# Patient Record
Sex: MALE | Race: WHITE | Employment: FULL TIME | URBAN - METROPOLITAN AREA
[De-identification: names, ages, dates, MRNs, and addresses within clinical notes are randomized per-mention and may not be internally consistent; named-entity substitution may affect disease eponyms.]

---

## 2018-02-12 ENCOUNTER — TELEPHONE (OUTPATIENT)
Dept: ENDOCRINOLOGY | Age: 43
End: 2018-02-12

## 2018-02-12 DIAGNOSIS — E11.65 TYPE 2 DIABETES MELLITUS WITH HYPERGLYCEMIA, UNSPECIFIED LONG TERM INSULIN USE STATUS: Primary | ICD-10-CM

## 2018-02-19 ENCOUNTER — OFFICE VISIT (OUTPATIENT)
Dept: ENDOCRINOLOGY | Age: 43
End: 2018-02-19

## 2018-02-19 VITALS
SYSTOLIC BLOOD PRESSURE: 132 MMHG | TEMPERATURE: 97.7 F | OXYGEN SATURATION: 96 % | WEIGHT: 315 LBS | BODY MASS INDEX: 46.65 KG/M2 | RESPIRATION RATE: 16 BRPM | DIASTOLIC BLOOD PRESSURE: 64 MMHG | HEIGHT: 69 IN | HEART RATE: 87 BPM

## 2018-02-19 DIAGNOSIS — E66.01 OBESITY, MORBID (HCC): ICD-10-CM

## 2018-02-19 DIAGNOSIS — Z79.4 TYPE 2 DIABETES MELLITUS WITH HYPERGLYCEMIA, WITH LONG-TERM CURRENT USE OF INSULIN (HCC): ICD-10-CM

## 2018-02-19 DIAGNOSIS — E11.65 TYPE 2 DIABETES MELLITUS WITH HYPERGLYCEMIA, WITH LONG-TERM CURRENT USE OF INSULIN (HCC): ICD-10-CM

## 2018-02-19 DIAGNOSIS — Z99.89 OSA ON CPAP: ICD-10-CM

## 2018-02-19 DIAGNOSIS — E29.1 HYPOGONADISM MALE: ICD-10-CM

## 2018-02-19 DIAGNOSIS — D35.2 PITUITARY MACROADENOMA (HCC): ICD-10-CM

## 2018-02-19 DIAGNOSIS — E78.2 MIXED HYPERLIPIDEMIA: ICD-10-CM

## 2018-02-19 DIAGNOSIS — G47.33 OSA ON CPAP: ICD-10-CM

## 2018-02-19 DIAGNOSIS — E22.1 HYPERPROLACTINEMIA (HCC): ICD-10-CM

## 2018-02-19 DIAGNOSIS — N52.9 ED (ERECTILE DYSFUNCTION): ICD-10-CM

## 2018-02-19 DIAGNOSIS — Z79.4 TYPE 2 DIABETES MELLITUS WITH HYPERGLYCEMIA, WITH LONG-TERM CURRENT USE OF INSULIN (HCC): Primary | ICD-10-CM

## 2018-02-19 DIAGNOSIS — E78.5 HYPERLIPIDEMIA: ICD-10-CM

## 2018-02-19 DIAGNOSIS — E11.65 TYPE 2 DIABETES MELLITUS WITH HYPERGLYCEMIA, WITH LONG-TERM CURRENT USE OF INSULIN (HCC): Primary | ICD-10-CM

## 2018-02-19 DIAGNOSIS — I10 HTN (HYPERTENSION): ICD-10-CM

## 2018-02-19 RX ORDER — SYRINGE, DISPOSABLE, 3 ML
SYRINGE, EMPTY DISPOSABLE MISCELLANEOUS
Qty: 10 SYRINGE | Refills: 3 | Status: SHIPPED | OUTPATIENT
Start: 2018-02-19 | End: 2020-04-21 | Stop reason: SDUPTHER

## 2018-02-19 RX ORDER — BLOOD-GLUCOSE METER
EACH MISCELLANEOUS
Qty: 1 EACH | Refills: 0 | Status: SHIPPED | OUTPATIENT
Start: 2018-02-19 | End: 2020-04-21 | Stop reason: SDUPTHER

## 2018-02-19 RX ORDER — LANCETS 33 GAUGE
EACH MISCELLANEOUS
Qty: 100 LANCET | Refills: 11 | Status: SHIPPED | OUTPATIENT
Start: 2018-02-19 | End: 2021-06-19

## 2018-02-19 RX ORDER — PEN NEEDLE, DIABETIC 30 GX3/16"
NEEDLE, DISPOSABLE MISCELLANEOUS
Qty: 200 PEN NEEDLE | Refills: 11 | Status: SHIPPED | OUTPATIENT
Start: 2018-02-19 | End: 2021-03-08 | Stop reason: SDUPTHER

## 2018-02-19 RX ORDER — TESTOSTERONE CYPIONATE 200 MG/ML
INJECTION INTRAMUSCULAR
Qty: 6 ML | Refills: 3 | Status: SHIPPED | OUTPATIENT
Start: 2018-02-19 | End: 2018-09-20 | Stop reason: SDUPTHER

## 2018-02-19 RX ORDER — INSULIN GLARGINE 100 [IU]/ML
INJECTION, SOLUTION SUBCUTANEOUS
Qty: 45 ML | Refills: 5 | Status: SHIPPED | OUTPATIENT
Start: 2018-02-19 | End: 2019-07-23 | Stop reason: SDUPTHER

## 2018-02-19 RX ORDER — PRAVASTATIN SODIUM 20 MG/1
20 TABLET ORAL
Qty: 30 TAB | Refills: 5 | Status: SHIPPED | OUTPATIENT
Start: 2018-02-19 | End: 2019-07-23 | Stop reason: SDUPTHER

## 2018-02-19 RX ORDER — METFORMIN HYDROCHLORIDE 500 MG/1
1000 TABLET, EXTENDED RELEASE ORAL 2 TIMES DAILY
Qty: 360 TAB | Refills: 5 | Status: SHIPPED | OUTPATIENT
Start: 2018-02-19 | End: 2019-07-24 | Stop reason: SDUPTHER

## 2018-02-19 NOTE — PROGRESS NOTES
[facility]               Zuleyka Orr MD        [place] Sox  1975          History of Present Illness: Rebeca Reis is a 44 y.o. male here for reestablishment of care. He had lost insurance, managed at [facility] and osteoporosis recently. He was out of insulin and did not take it for  few months, his A1c is very high  He also could not get testosterone as he was taking for hypogonadism  He had MRI of the pituitary in 2016 and was reported as \"small microadenoma\"  No headaches, peripheral vision loss. No hypoglycemia  Has polyuria, polydipsia  No chest pain or shortness of breath  Complains of tingling in the feet    TROY - on Bipap    Type 2 Diabetes was diagnosed in 6yrs ago .   extended-release, insurance did not cover Januvia  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 prolactin    MRI 2016 Sep        Wt Readings from Last 3 Encounters:   02/19/18 320 lb 9.6 oz (145.4 kg)   11/25/14 324 lb 3.2 oz (147.1 kg)   07/22/14 318 lb (144.2 kg)       BP Readings from Last 3 Encounters:   02/19/18 132/64   11/25/14 137/78   07/22/14 134/84           Past Medical History:   Diagnosis Date    Anxiety     Arthritis     DM (diabetes mellitus) ([address]     GERD (gastroesophageal reflux disease)     HTN (hypertension)     Hyperprolactinemia ([address]     Hypogonadotropic hypogonadism ([address]     TROY on CPAP     Pituitary macroadenoma ([facility] December 2014     Current Outpatient Prescriptions   Medication Sig    insulin detemir (LEVEMIR) 100 unit/mL (3 mL) pen Inject 60 twice a day (Patient taking differently: Inject 75 twice a day)    insulin lispro (HUMALOG) 100 unit/mL kwikpen Inject 35 units before breakfast, 35 units before lunch and 35 units before dinner w/ SSI Max units: 165    lisinopril-hydrochlorothiazide (PRINZIDE) 20-12.5 mg per tablet 1 tab in AM    glucose blood VI test strips (CONTOUR NEXT STRIPS) strip Test 3 times daily    Lancets (MICROLET LANCET) misc Test 3 times daily    glucose blood VI test strips (ASCENSIA CONTOUR) strip Test three times daily    Blood-Glucose Meter (CONTOUR NEXT METER) Misc Use as directed    Insulin Needles, Disposable, 31 X 5/16 \" Ndle Dispense 100 each, use once a day with lantus    ALPRAZolam (XANAX) 0.25 mg tablet Take 1 tablet by mouth two (2) times daily as needed for Anxiety.  metFORMIN ER (GLUCOPHAGE XR) 500 mg tablet Take 2 tablets by mouth two (2) times a day.  albiglutide (TANZEUM) 30 mg/0.5 mL pnij 30 mg by SubCUTAneous route every seven (7) days.  cabergoline (DOSTINEX) 0.5 mg tablet 1/2 pill at bedtime Monday and Thursday    dapagliflozin (FARXIGA) 10 mg tab Take 10 mg by mouth daily.  testosterone cypionate (DEPOTESTOTERONE CYPIONATE) 200 mg/mL injection 1.25 ml ( 250 mg ) IM every 2 weeks    Needle, Disp, 22 G 22 x 1 1/2 \" ndle Q 2 weeks    Syringe, Disposable, 3 mL syrg Once Q 2 weeks    tadalafil (CIALIS) 20 mg tablet Take 1 Tab by mouth as needed. Every 5 days     No current facility-administered medications for this visit. Allergies   Allergen Reactions    Amoxicillin Hives    Erythromycin Other (comments)     Stomach cramps    Penicillins Hives    Sulfa (Sulfonamide Antibiotics) Hives         Review of Systems:  - Constitutional Symptoms: no fevers, no chills  - Eyes: no blurry vision no double vision  - Cardiovascular: no chest pain ,no palpitations  - Respiratory: no cough no shortness of breath  - Gastrointestinal: no dysphagia no  abdominal pain  - Musculoskeletal: no joint pains ,  weakness  - Integumentary: no rashes  - Neurological: no numbness, tingling, no  headaches  -     Physical Examination:   Blood pressure 132/64, pulse 87, temperature 97.7 °F (36.5 °C), temperature source Oral, resp. rate 16, height 5' 9\" (1.753 m), weight 320 lb 9.6 oz (145.4 kg), SpO2 96 %.  Estimated body mass index is 47.34 kg/(m^2) as calculated from the following:    Height as of this encounter: 5' 9\" (1.753 m). -   Weight as of this encounter: 320 lb 9.6 oz (145.4 kg). - General: pleasant, no distress, good eye contact  - HEENT: no pallor, no periorbital edema, EOMI  - Neck: supple, no thyromegaly, thick neck  - Cardiovascular: regular, normal rate, normal S1 and S2  - Respiratory: clear to auscultation bilaterally  - Gastrointestinal: soft, nontender, nondistended,  BS +  - Musculoskeletal:,alert and oriented  - Psychiatric: normal mood and affect  - Skin: color, texture, turgor normal.     Diabetic foot exam: February 2018    Left:     Vibratory sensation decreased   Filament test normal sensation with micro filament   Pulse DP: 1+    Deformities: None  Right:    Vibratory sensation decreased   Filament test normal sensation with micro filament   Pulse DP: 1+   Deformities: None      Data Reviewed:     [] Glucose records reviewed. [] See glucose records for details (to be scanned). [x] A1C 12  [x] Reviewed labs    Lab Results   Component Value Date/Time    WBC 9.8 07/10/2014 03:42 PM    HGB 13.7 07/10/2014 03:42 PM    HCT 40.8 07/10/2014 03:42 PM    PLATELET 771 69/63/4195 03:42 PM    MCV 81 07/10/2014 03:42 PM     Lab Results   Component Value Date/Time    Hemoglobin A1c 11.2 (H) 02/13/2018 02:00 PM    Hemoglobin A1c 10.7 (H) 11/18/2014 09:57 AM    Hemoglobin A1c 9.4 (H) 07/10/2014 03:42 PM    Microalb/Creat ratio (ug/mg creat.) <4.4 02/13/2018 02:00 PM    LDL, calculated 110 (H) 02/13/2018 02:00 PM    Creatinine 0.72 (L) 02/13/2018 02:00 PM      Lab Results   Component Value Date/Time    Cholesterol, total 169 02/13/2018 02:00 PM    HDL Cholesterol 29 (L) 02/13/2018 02:00 PM    LDL, calculated 110 (H) 02/13/2018 02:00 PM    Triglyceride 149 02/13/2018 02:00 PM     Lab Results   Component Value Date/Time    AST (SGOT) 15 02/13/2018 02:00 PM    Alk.  phosphatase 62 02/13/2018 02:00 PM     Lab Results   Component Value Date/Time    GFR est  02/13/2018 02:00 PM    GFR est non- 02/13/2018 02:00 PM    Creatinine 0.72 (L) 02/13/2018 02:00 PM    BUN 11 02/13/2018 02:00 PM    Sodium 137 02/13/2018 02:00 PM    Potassium 4.6 02/13/2018 02:00 PM    Chloride 96 02/13/2018 02:00 PM    CO2 26 02/13/2018 02:00 PM      Lab Results   Component Value Date/Time    Prostate Specific Ag 0.9 11/18/2014 09:57 AM    Prostate Specific Ag 0.2 08/08/2013 11:35 AM     Lab Results   Component Value Date/Time    TSH 1.800 08/08/2013 11:35 AM    T4, Free 1.38 01/02/2014 08:42 AM      Component      Latest Ref Rng 8/8/2013   TESTOSTERONE, TOTAL, LC/MS      348.0 - 1197.0 ng/dL 15.4 (L)   TESTOSTERONE, FREE      5.00 - 21.00 ng/dL 0.67 (L)   % Free testosterone      1.50 - 4.20 % 4.36 (H)   Luteinizing hormone      1.7 - 8.6 mIU/mL 0.5 (L)   FSH      1.5 - 12.4 mIU/mL 0.8 (L)   Prolactin      4.0 - 15.2 ng/mL 130.7 (H)      Lab Results   Component Value Date/Time    Testosterone 206 (L) 07/10/2014 03:42 PM     Lab Results   Component Value Date/Time    FSH 0.8 (L) 08/08/2013 11:35 AM    Luteinizing hormone 0.5 (L) 08/08/2013 11:35 AM    Prolactin 7.0 07/10/2014 03:42 PM           Lab Results   Component Value Date/Time    Hemoglobin A1c 11.2 (H) 02/13/2018 02:00 PM    Hemoglobin A1c 10.7 (H) 11/18/2014 09:57 AM    Hemoglobin A1c 9.4 (H) 07/10/2014 03:42 PM    Microalb/Creat ratio (ug/mg creat.) <4.4 02/13/2018 02:00 PM    LDL, calculated 110 (H) 02/13/2018 02:00 PM    Creatinine 0.72 (L) 02/13/2018 02:00 PM      MRI 2014: normal      Assessment/Plan:     1. Type 2 Diabetes Mellitus  Severe hyperglycemia due to missing insulin  Resume insulin  Metformin XR , resume  Insulin glargine insulin 75 units BID  Novolog/Humalog insulin 35 units before each meal  Correction 1 unit for every 8 mg  Has tried Farxiga 10 mg ,Tanzeum 30 mg   FLU annually ,Pneumovax ,aspirin daily,annual eye exam,microalbumin.   Needs monitoring of the blood glucose 4 times a day, not able to get this test strips from insurance. This puts him at a very high risk for hypoglycemia if he does not monitor. 2. Hyperlipidemia:  Low-fat, low-cholesterol diet. recent dietary indiscretion,agreed to change diet , recheck lipids and if no improvement start statins    3. HTN :prinzide    4. TROY- on CPAP    5. Hypogonadotropic hypogonadism: secondary to obesity, narcotics, TROY  Testosterone did not improve much with normalization of prolactin  Replacement testosterone  Cialis PRN     5. Macroadenoma 1.6 cm -prolactin secreting  MRI pituitary in August 2014 - no lesion  MRI 2016: Small microadenoma  He has taken cabergoline for 1-1/2 year, check prolactin and if normal will hold off. Monitor prolactin levels.         Corinne Gent, MD

## 2018-02-19 NOTE — PROGRESS NOTES
Dev Baxter is a 36 y.o. male here for   Chief Complaint   Patient presents with   [facility] Patient     DM, last seen 11/25/14       Functional glucose monitor and record keeping system? - yes  Eye exam within last year? - need referral  Foot exam within last year? - due today    1. Have you been to the ER, urgent care clinic since your last visit? Hospitalized since your last visit? -no    2. Have you seen or consulted any other health care providers outside of the [address] [place] [address] since your last visit? Include any pap smears or colon screening. -PCP      Lab Results   Component Value Date/Time    Hemoglobin A1c 11.2 (H) 02/13/2018 02:00 PM    Hemoglobin A1c (POC) 11.6 11/07/2013 10:20 AM       Wt Readings from Last 3 Encounters:   11/25/14 324 lb 3.2 oz (147.1 kg)   07/22/14 318 lb (144.2 kg)   07/17/14 318 lb (144.2 kg)     Temp Readings from Last 3 Encounters:   11/25/14 97.4 °F (36.3 °C)   01/09/14 98.5 °F (36.9 °C) (Oral)     BP Readings from Last 3 Encounters:   11/25/14 137/78   07/22/14 134/84   07/17/14 148/81     Pulse Readings from Last 3 Encounters:   11/25/14 78   07/22/14 76   07/17/14 71

## 2018-02-19 NOTE — PATIENT INSTRUCTIONS
Check blood sugars before meals/ and at bedtime. If the bedtime sugars are less than 100 ,eat a 15 gm snack. Weight and diet control. Start Metformin  mg 2 tabs twice a day     Basaglar insulin 75 twice a day     If morning glucose before breakfast is more than 140 , 2 days in a row , go up by 5 units  until your morning sugar is consistently less than 140. Humalog insulin 35 units before breakfast, 35 units before lunch and 35 units before dinner.      Humalog  with meals if blood sugars are[de-identified]   Less than 70 -            decrease by 5 units  150-200 mg   5 units   201-250 mg   10 units   251-300 mg   15 units   301-350 mg   18 units   351-400 mg   20  units

## 2018-02-19 NOTE — MR AVS SNAPSHOT
[address] [address] 
[phone] Patient: Gifty Payan MRN: [id number] :[id number] Visit Information Date & Time Provider Department Dept. Phone Encounter #  
 2018  2:00 PM Yokasta Garcia MD [facility] [phone] [id number] Follow-up Instructions Return in about 3 months (around 2018). Upcoming Health Maintenance Date Due  
 FOOT EXAM Q1 1984 EYE EXAM RETINAL OR DILATED Q1 1984 Pneumococcal 19-64 Medium Risk (1 of 1 - PPSV23) 1993 DTaP/Tdap/Td series (1 - Tdap) 1995 Influenza Age 5 to Adult 2017 HEMOGLOBIN A1C Q6M 2018 MICROALBUMIN Q1 2019 LIPID PANEL Q1 2019 Allergies as of 2018  Review Complete On: 2018 By: Yokasta Garcia MD  
  
 Severity Noted Reaction Type Reactions Amoxicillin  2018    Hives Erythromycin  2013    Other (comments) Stomach cramps Penicillins  2013    Hives Sulfa (Sulfonamide Antibiotics)  2013    Hives Current Immunizations  Never Reviewed No immunizations on file. Not reviewed this visit You Were Diagnosed With   
  
 Codes Comments Type 2 diabetes mellitus with hyperglycemia, with long-term current use of insulin (HCC)    -  Primary ICD-10-CM: E11.65, Z79.4 ICD-9-CM: 250.00, 790.29, V58.67 Hypogonadism male     ICD-10-CM: E29.1 ICD-9-CM: 257.2 Hyperprolactinemia ([facility]     ICD-10-CM: E22.1 ICD-9-CM: 253.1 Pituitary macroadenoma ([facility]     ICD-10-CM: D35.2 ICD-9-CM: 227.3 Vitals BP Pulse Temp Resp Height(growth percentile) Weight(growth percentile) 132/64 (BP 1 Location: Left arm, BP Patient Position: Sitting) 87 97.7 °F (36.5 °C) (Oral) 16 5' 9\" (1.753 m) 320 lb 9.6 oz (145.4 kg) SpO2 BMI Smoking Status 96% 47.34 kg/m2 Never Smoker Vitals History BMI and BSA Data Body Mass Index Body Surface Area  
 47.34 kg/m 2 2.66 m 2 Preferred Pharmacy Pharmacy Name Phone [facility], [address] [place] [phone] Your Updated Medication List  
  
   
This list is accurate as of: 2/19/18  2:54 PM.  Always use your most recent med list.  
  
  
  
  
 ALPRAZolam 0.25 mg tablet Commonly known as:  Flavia Alert Take 1 tablet by mouth two (2) times daily as needed for Anxiety. Blood-Glucose Meter Misc Commonly known as:  ONETOUCH VERIO FLEX Test TID Dx Code: E11.65  
  
 cabergoline 0.5 mg tablet Commonly known as:  DOSTINEX  
1/2 pill at bedtime Monday and Thursday  
  
 dapagliflozin 10 mg Tab tablet Commonly known as:  [employer] Take 10 mg by mouth daily. insulin detemir U-100 100 unit/mL (3 mL) Inpn Commonly known as:  Andi Radha Inject 60 twice a day  
  
 insulin lispro 100 unit/mL kwikpen Commonly known as:  HUMALOG Inject 35 units before breakfast, 35 units before lunch and 35 units before dinner w/ SSI Max units: 165 Insulin Needles (Disposable) 31 gauge x 5/16\" Ndle Dispense 100 each, use once a day with lantus  
  
 lisinopril-hydroCHLOROthiazide 20-12.5 mg per tablet Commonly known as:  PRINZIDE  
1 tab in AM  
  
 metFORMIN  mg tablet Commonly known as:  GLUCOPHAGE XR Take 2 tablets by mouth two (2) times a day. Needle (Disp) 22 G 22 gauge x 1 1/2\" Ndle Q 2 weeks Syringe (Disposable) 3 mL Syrg Once Q 2 weeks  
  
 tadalafil 20 mg tablet Commonly known as:  CIALIS Take 1 Tab by mouth as needed. Every 5 days  
  
 testosterone cypionate 200 mg/mL injection Commonly known as:  DEPOTESTOTERONE CYPIONATE  
1.25 ml ( 250 mg ) IM every 2 weeks Prescriptions Printed Refills Blood-Glucose Meter (ONETOUCH VERIO FLEX) misc 0 Sig: Test TID Dx Code: E11.65 Class: Print We Performed the Following CBC WITH AUTOMATED DIFF [58993 CPT(R)] PROLACTIN [18591 CPT(R)] PSA, DIAGNOSTIC (PROSTATE SPECIFIC AG) [id number] CPT(R)] Follow-up Instructions Return in about 3 months (around 5/19/2018). Patient Instructions Check blood sugars before meals/ and at bedtime. If the bedtime sugars are less than 100 ,eat a 15 gm snack. Weight and diet control. Start Metformin  mg 2 tabs twice a day Basaglar insulin 75 twice a day If morning glucose before breakfast is more than 140 , 2 days in a row , go up by 5 units  until your morning sugar is consistently less than 140. Humalog insulin 35 units before breakfast, 35 units before lunch and 35 units before dinner. Humalog  with meals if blood sugars are[de-identified]  
Less than 70 -            decrease by 5 units 150-200 mg   5 units 201-250 mg   10 units 251-300 mg   15 units 301-350 mg   18 units 351-400 mg   20  units Introducing [facility] SERVICES! Dear Te: Thank you for requesting a [employer] account. Our records indicate that you already have an active [employer] account. You can access your account anytime at [email or web address] Did you know that you can access your hospital and ER discharge instructions at any time in [employer]? You can also review all of your test results from your hospital stay or ER visit. Additional Information If you have questions, please visit the Frequently Asked Questions section of the [employer] website at [email or web address] Remember, [employer] is NOT to be used for urgent needs. For medical emergencies, dial 911. Now available from your iPhone and Android! Please provide this summary of care documentation to your next provider. Your primary care clinician is listed as Daryl Polk. If you have any questions after today's visit, please call [phone].

## 2018-02-20 LAB
BASOPHILS # BLD AUTO: 0 X10E3/UL (ref 0–0.2)
BASOPHILS NFR BLD AUTO: 0 %
EOSINOPHIL # BLD AUTO: 0.3 X10E3/UL (ref 0–0.4)
EOSINOPHIL NFR BLD AUTO: 3 %
ERYTHROCYTE [DISTWIDTH] IN BLOOD BY AUTOMATED COUNT: 14.2 % (ref 12.3–15.4)
HCT VFR BLD AUTO: 40.4 % (ref 37.5–51)
HGB BLD-MCNC: 13.2 G/DL (ref 13–17.7)
IMM GRANULOCYTES # BLD: 0.1 X10E3/UL (ref 0–0.1)
IMM GRANULOCYTES NFR BLD: 1 %
LYMPHOCYTES # BLD AUTO: 3.7 X10E3/UL (ref 0.7–3.1)
LYMPHOCYTES NFR BLD AUTO: 35 %
MCH RBC QN AUTO: 27 PG (ref 26.6–33)
MCHC RBC AUTO-ENTMCNC: 32.7 G/DL (ref 31.5–35.7)
MCV RBC AUTO: 83 FL (ref 79–97)
MONOCYTES # BLD AUTO: 0.6 X10E3/UL (ref 0.1–0.9)
MONOCYTES NFR BLD AUTO: 6 %
NEUTROPHILS # BLD AUTO: 5.9 X10E3/UL (ref 1.4–7)
NEUTROPHILS NFR BLD AUTO: 55 %
PLATELET # BLD AUTO: 399 X10E3/UL (ref 150–379)
PROLACTIN SERPL-MCNC: 55.3 NG/ML (ref 4–15.2)
PSA SERPL-MCNC: 0.2 NG/ML (ref 0–4)
RBC # BLD AUTO: 4.89 X10E6/UL (ref 4.14–5.8)
WBC # BLD AUTO: 10.6 X10E3/UL (ref 3.4–10.8)

## 2018-02-23 ENCOUNTER — TELEPHONE (OUTPATIENT)
Dept: ENDOCRINOLOGY | Age: 43
End: 2018-02-23

## 2018-02-23 DIAGNOSIS — E22.1 HYPERPROLACTINEMIA (HCC): ICD-10-CM

## 2018-02-23 RX ORDER — CABERGOLINE 0.5 MG/1
TABLET ORAL
Qty: 12 TAB | Refills: 3 | Status: SHIPPED | OUTPATIENT
Start: 2018-02-23 | End: 2019-10-31

## 2018-02-23 NOTE — TELEPHONE ENCOUNTER
----- Message from Tisha Hussein MD sent at 2/22/2018  9:17 PM EST -----  Need cabergoline as prolactin is elevated

## 2018-02-23 NOTE — TELEPHONE ENCOUNTER
Per Dr. Tina Tapia, informed pt of result note, as noted above. Pt verbalized understanding with no further questions or concerns at this time.

## 2018-09-20 DIAGNOSIS — G47.33 OSA ON CPAP: ICD-10-CM

## 2018-09-20 DIAGNOSIS — E29.1 HYPOGONADISM MALE: ICD-10-CM

## 2018-09-20 DIAGNOSIS — E11.65 TYPE 2 DIABETES MELLITUS WITH HYPERGLYCEMIA, WITH LONG-TERM CURRENT USE OF INSULIN (HCC): ICD-10-CM

## 2018-09-20 DIAGNOSIS — E22.1 HYPERPROLACTINEMIA (HCC): ICD-10-CM

## 2018-09-20 DIAGNOSIS — Z79.4 TYPE 2 DIABETES MELLITUS WITH HYPERGLYCEMIA, WITH LONG-TERM CURRENT USE OF INSULIN (HCC): ICD-10-CM

## 2018-09-20 DIAGNOSIS — Z99.89 OSA ON CPAP: ICD-10-CM

## 2018-09-20 RX ORDER — TESTOSTERONE CYPIONATE 200 MG/ML
INJECTION INTRAMUSCULAR
Qty: 6 ML | Refills: 0 | Status: SHIPPED | OUTPATIENT
Start: 2018-09-20 | End: 2019-07-23 | Stop reason: SDUPTHER

## 2019-01-21 ENCOUNTER — OP HISTORICAL/CONVERTED ENCOUNTER (OUTPATIENT)
Dept: OTHER | Age: 44
End: 2019-01-21

## 2019-02-26 ENCOUNTER — ED HISTORICAL/CONVERTED ENCOUNTER (OUTPATIENT)
Dept: OTHER | Age: 44
End: 2019-02-26

## 2019-07-23 ENCOUNTER — OFFICE VISIT (OUTPATIENT)
Dept: ENDOCRINOLOGY | Age: 44
End: 2019-07-23

## 2019-07-23 VITALS
RESPIRATION RATE: 16 BRPM | OXYGEN SATURATION: 98 % | HEART RATE: 77 BPM | TEMPERATURE: 97.4 F | WEIGHT: 315 LBS | HEIGHT: 69 IN | DIASTOLIC BLOOD PRESSURE: 87 MMHG | BODY MASS INDEX: 46.65 KG/M2 | SYSTOLIC BLOOD PRESSURE: 165 MMHG

## 2019-07-23 DIAGNOSIS — R35.1 NOCTURIA: ICD-10-CM

## 2019-07-23 DIAGNOSIS — Z99.89 OSA ON CPAP: ICD-10-CM

## 2019-07-23 DIAGNOSIS — E11.65 TYPE 2 DIABETES MELLITUS WITH HYPERGLYCEMIA, WITH LONG-TERM CURRENT USE OF INSULIN (HCC): ICD-10-CM

## 2019-07-23 DIAGNOSIS — D35.2 PITUITARY MACROADENOMA (HCC): ICD-10-CM

## 2019-07-23 DIAGNOSIS — E11.9 DM (DIABETES MELLITUS) (HCC): ICD-10-CM

## 2019-07-23 DIAGNOSIS — G47.33 OSA ON CPAP: ICD-10-CM

## 2019-07-23 DIAGNOSIS — E66.01 OBESITY, MORBID (HCC): Primary | ICD-10-CM

## 2019-07-23 DIAGNOSIS — E66.01 OBESITY, MORBID (HCC): ICD-10-CM

## 2019-07-23 DIAGNOSIS — E29.1 HYPOGONADISM MALE: ICD-10-CM

## 2019-07-23 DIAGNOSIS — I10 HTN (HYPERTENSION): ICD-10-CM

## 2019-07-23 DIAGNOSIS — E22.1 HYPERPROLACTINEMIA (HCC): ICD-10-CM

## 2019-07-23 DIAGNOSIS — Z79.4 TYPE 2 DIABETES MELLITUS WITH HYPERGLYCEMIA, WITH LONG-TERM CURRENT USE OF INSULIN (HCC): ICD-10-CM

## 2019-07-23 LAB — HBA1C MFR BLD HPLC: 7.6 %

## 2019-07-23 RX ORDER — EMTRICITABINE AND TENOFOVIR DISOPROXIL FUMARATE 200; 300 MG/1; MG/1
TABLET, FILM COATED ORAL DAILY
COMMUNITY
End: 2020-02-05 | Stop reason: SDUPTHER

## 2019-07-23 RX ORDER — PRAVASTATIN SODIUM 20 MG/1
20 TABLET ORAL
Qty: 30 TAB | Refills: 5 | Status: SHIPPED | OUTPATIENT
Start: 2019-07-23 | End: 2021-06-19

## 2019-07-23 RX ORDER — TESTOSTERONE CYPIONATE 200 MG/ML
INJECTION INTRAMUSCULAR
Qty: 6 ML | Refills: 0 | Status: SHIPPED | OUTPATIENT
Start: 2019-07-23 | End: 2020-04-21 | Stop reason: SDUPTHER

## 2019-07-23 RX ORDER — INSULIN GLARGINE 100 [IU]/ML
INJECTION, SOLUTION SUBCUTANEOUS
Qty: 45 ML | Refills: 5 | Status: SHIPPED | OUTPATIENT
Start: 2019-07-23 | End: 2020-04-21 | Stop reason: SDUPTHER

## 2019-07-23 RX ORDER — PEN NEEDLE, DIABETIC 30 GX3/16"
NEEDLE, DISPOSABLE MISCELLANEOUS
Qty: 100 PEN NEEDLE | Refills: 11 | Status: SHIPPED | OUTPATIENT
Start: 2019-07-23 | End: 2021-03-16 | Stop reason: SDUPTHER

## 2019-07-23 NOTE — PROGRESS NOTES
1. Have you been to the ER, urgent care clinic since your last visit? Hospitalized since your last visit? NO  2. Have you seen or consulted any other health care providers outside of the [address] [place] [address] since your last visit? Include any pap smears or colon screening. NO    Chief Complaint   Patient presents with    Diabetes     follow up         Learning assessment complete  Abuse Screening Questionnaire 7/23/2019   Do you ever feel afraid of your partner? N   Are you in a relationship with someone who physically or mentally threatens you? N   Is it safe for you to go home? Y     Wt Readings from Last 3 Encounters:   07/23/19 333 lb 6.4 oz (151.2 kg)   02/19/18 320 lb 9.6 oz (145.4 kg)   11/25/14 324 lb 3.2 oz (147.1 kg)     Temp Readings from Last 3 Encounters:   07/23/19 97.4 °F (36.3 °C) (Oral)   02/19/18 97.7 °F (36.5 °C) (Oral)   11/25/14 97.4 °F (36.3 °C)     BP Readings from Last 3 Encounters:   07/23/19 165/87   02/19/18 132/64   11/25/14 137/78     Pulse Readings from Last 3 Encounters:   07/23/19 77   02/19/18 87   11/25/14 78     Medication reviewed with patient to the best of his ability and nurse document \"not taking\" and/or \"taking\" based on patients response.   Lab Results   Component Value Date/Time    Hemoglobin A1c 11.2 (H) 02/13/2018 02:00 PM    Hemoglobin A1c (POC) 7.6 07/23/2019 01:16 PM

## 2019-07-23 NOTE — PROGRESS NOTES
[facility] DIABETES AND ENDOCRINOLOGY               MD Dominique Pineda  1975          History of Present Illness: Darling Polk is a 44 y.o. male here for follow-up  He was seen 1 and 1/2-year ago  Run out of prescriptions, he did not take blood pressure medication, statin, cabergoline, testosterone  [facility]  Recently has changed the diet, and is able to control the blood glucose with just basal insulin    No headaches, peripheral vision loss. No hypoglycemia  Has polyuria, polydipsia  No chest pain or shortness of breath  Complains of tingling in the feet    TROY - on Bipap    Type 2 Diabetes was diagnosed in 6yrs ago .   extended-release, insurance did not cover Januvia  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 prolactin    MRI 2016 Sep        Wt Readings from Last 3 Encounters:   07/23/19 333 lb 6.4 oz (151.2 kg)   02/19/18 320 lb 9.6 oz (145.4 kg)   11/25/14 324 lb 3.2 oz (147.1 kg)       BP Readings from Last 3 Encounters:   07/23/19 165/87   02/19/18 132/64   11/25/14 137/78           Past Medical History:   Diagnosis Date    Anxiety     Arthritis     DM [facility]     GERD (gastroesophageal reflux disease)     HTN (hypertension)     Hyperprolactinemia (HCC)     Hypogonadotropic hypogonadism ([facility]     TROY on CPAP     Pituitary macroadenoma ([facility] [facility] December 2014     Current Outpatient Medications   Medication Sig    Blood-Glucose Meter (ONETOUCH VERIO FLEX) misc Test TID Dx Code: E11.65    insulin glargine (BASAGLAR KWIKPEN U-100 INSULIN) 100 unit/mL (3 mL) inpn 75 units twice a day    Syringe, Disposable, 3 mL syrg Once Q 2 weeks    Needle, Disp, 22 G 22 gauge x 1 1/2\" ndle Q 2 weeks    lancets (ONE TOUCH DELICA) 33 gauge misc Test TID Dx Code: E11.65    glucose blood VI test strips (ONETOUCH VERIO) strip Test TID Dx Code: E11.65    Insulin Needles, Disposable, 31 gauge x 5/16\" ndle Use to inject insulin 5x daily Dx Code: E11.65    Insulin Needles, Disposable, 31 X 5/16 \" Ndle Dispense 100 each, use once a day with lantus    testosterone cypionate (DEPOTESTOTERONE CYPIONATE) 200 mg/mL injection INJECT 1.25 ML INTO THE MUSCLE EVERY 2 WEEKS    cabergoline (DOSTINEX) 0.5 mg tablet 1/2 pill at bedtime Monday and Thursday    insulin lispro (HUMALOG KWIKPEN INSULIN) 200 unit/mL (3 mL) inpn 35 units before breakfast, 35 units before lunch and 35 units before dinner w/ SSI Max units daily: 165    metFORMIN ER (GLUCOPHAGE XR) 500 mg tablet Take 2 Tabs by mouth two (2) times a day.  pravastatin (PRAVACHOL) 20 mg tablet Take 1 Tab by mouth nightly.  ALPRAZolam (XANAX) 0.25 mg tablet Take 1 tablet by mouth two (2) times daily as needed for Anxiety.  lisinopril-hydrochlorothiazide (PRINZIDE) 20-12.5 mg per tablet 1 tab in AM    dapagliflozin (FARXIGA) 10 mg tab Take 10 mg by mouth daily.  tadalafil (CIALIS) 20 mg tablet Take 1 Tab by mouth as needed. Every 5 days     No current facility-administered medications for this visit. Allergies   Allergen Reactions    Amoxicillin Hives    Erythromycin Other (comments)     Stomach cramps    Penicillins Hives    Sulfa (Sulfonamide Antibiotics) Hives         Review of Systems:  - Constitutional Symptoms: no fevers, no chills  - Eyes: no blurry vision no double vision  - Cardiovascular: no chest pain ,no palpitations  - Respiratory: no cough no shortness of breath  - Gastrointestinal: no dysphagia no  abdominal pain  - Musculoskeletal: no joint pains ,  weakness  - Integumentary: no rashes  - Neurological: no numbness, tingling, no  headaches  -     Physical Examination:   Blood pressure 165/87, pulse 77, temperature 97.4 °F (36.3 °C), temperature source Oral, resp. rate 16, height 5' 9\" (1.753 m), weight 333 lb 6.4 oz (151.2 kg), SpO2 98 %.  Estimated body mass index is 49.23 kg/m² as calculated from the following: Height as of this encounter: 5' 9\" (1.753 m). -   Weight as of this encounter: 333 lb 6.4 oz (151.2 kg). - General: pleasant, no distress, good eye contact  - HEENT: no pallor, no periorbital edema, EOMI  - Neck: supple, no thyromegaly, thick neck  - Cardiovascular: regular, normal rate, normal S1 and S2  - Respiratory: clear to auscultation bilaterally  - Gastrointestinal: soft, nontender, nondistended,  BS +  - Musculoskeletal:,alert and oriented  - Psychiatric: normal mood and affect  - Skin: color, texture, turgor normal.     Diabetic foot exam: July 2019    Left:     Vibratory sensation decreased   Filament test normal sensation with micro filament   Pulse DP: 1+    Deformities: None  Right:    Vibratory sensation decreased   Filament test normal sensation with micro filament   Pulse DP: 1+   Deformities: None      Data Reviewed:       Lab Results   Component Value Date/Time    WBC 10.6 02/19/2018 03:02 PM    HGB 13.2 02/19/2018 03:02 PM    HCT 40.4 02/19/2018 03:02 PM    PLATELET 675 (H) 52/10/8492 03:02 PM    MCV 83 02/19/2018 03:02 PM     Lab Results   Component Value Date/Time    Hemoglobin A1c 11.2 (H) 02/13/2018 02:00 PM    Hemoglobin A1c 10.7 (H) 11/18/2014 09:57 AM    Hemoglobin A1c 9.4 (H) 07/10/2014 03:42 PM    Microalb/Creat ratio (ug/mg creat.) <4.4 02/13/2018 02:00 PM    LDL, calculated 110 (H) 02/13/2018 02:00 PM    Creatinine 0.72 (L) 02/13/2018 02:00 PM      Lab Results   Component Value Date/Time    Cholesterol, total 169 02/13/2018 02:00 PM    HDL Cholesterol 29 (L) 02/13/2018 02:00 PM    LDL, calculated 110 (H) 02/13/2018 02:00 PM    Triglyceride 149 02/13/2018 02:00 PM     Lab Results   Component Value Date/Time    AST (SGOT) 15 02/13/2018 02:00 PM    Alk.  phosphatase 62 02/13/2018 02:00 PM     Lab Results   Component Value Date/Time    GFR est  02/13/2018 02:00 PM    GFR est non- 02/13/2018 02:00 PM    Creatinine 0.72 (L) 02/13/2018 02:00 PM    BUN 11 02/13/2018 02:00 PM Sodium 137 02/13/2018 02:00 PM    Potassium 4.6 02/13/2018 02:00 PM    Chloride 96 02/13/2018 02:00 PM    CO2 26 02/13/2018 02:00 PM      Lab Results   Component Value Date/Time    Prostate Specific Ag 0.2 02/19/2018 03:02 PM    Prostate Specific Ag 0.9 11/18/2014 09:57 AM    Prostate Specific Ag 0.2 08/08/2013 11:35 AM     Lab Results   Component Value Date/Time    TSH 1.800 08/08/2013 11:35 AM    T4, Free 1.38 01/02/2014 08:42 AM      Component      Latest Ref Rng 8/8/2013   TESTOSTERONE, TOTAL, LC/MS      348.0 - 1197.0 ng/dL 15.4 (L)   TESTOSTERONE, FREE      5.00 - 21.00 ng/dL 0.67 (L)   % Free testosterone      1.50 - 4.20 % 4.36 (H)   Luteinizing hormone      1.7 - 8.6 mIU/mL 0.5 (L)   FSH      1.5 - 12.4 mIU/mL 0.8 (L)   Prolactin      4.0 - 15.2 ng/mL 130.7 (H)      Lab Results   Component Value Date/Time    Testosterone 206 (L) 07/10/2014 03:42 PM     Lab Results   Component Value Date/Time    FSH 0.8 (L) 08/08/2013 11:35 AM    Luteinizing hormone 0.5 (L) 08/08/2013 11:35 AM    Prolactin 55.3 (H) 02/19/2018 03:02 PM           Lab Results   Component Value Date/Time    Hemoglobin A1c 11.2 (H) 02/13/2018 02:00 PM    Hemoglobin A1c 10.7 (H) 11/18/2014 09:57 AM    Hemoglobin A1c 9.4 (H) 07/10/2014 03:42 PM    Microalb/Creat ratio (ug/mg creat.) <4.4 02/13/2018 02:00 PM    LDL, calculated 110 (H) 02/13/2018 02:00 PM    Creatinine 0.72 (L) 02/13/2018 02:00 PM      MRI 2014: normal      Assessment/Plan:     1. Type 2 Diabetes Mellitus  He has made changes to the diet, A1c 7.6  Metformin XR , resume  Insulin glargine insulin 75 units BID  He was supposed to be on, but however not requiring   Novolog/Humalog insulin 35 units before each meal  Correction 1 unit for every 8 mg/dl  Has tried Farxiga 10 mg ,Tanzeum 30 mg   FLU annually ,Pneumovax ,aspirin daily,annual eye exam,microalbumin. 2. Hyperlipidemia: Low-cholesterol diet  Statin    3. HTN :prinzide    4. TROY- on CPAP    5.  Hypogonadotropic hypogonadism: secondary to obesity, narcotics, TROY  Testosterone did not improve much with normalization of prolactin  Was on replacement testosterone  Cialis PRN     5. Macroadenoma 1.6 cm -prolactin secreting  MRI pituitary in August 2014 - no lesion  MRI 2016: Small microadenoma  Check prolactin today        Doc Hatchet, MD      Patient verbalized understanding

## 2019-07-24 DIAGNOSIS — E22.1 HYPERPROLACTINEMIA (HCC): Primary | ICD-10-CM

## 2019-07-24 DIAGNOSIS — Z79.4 TYPE 2 DIABETES MELLITUS WITH HYPERGLYCEMIA, WITH LONG-TERM CURRENT USE OF INSULIN (HCC): ICD-10-CM

## 2019-07-24 DIAGNOSIS — G47.33 OSA ON CPAP: ICD-10-CM

## 2019-07-24 DIAGNOSIS — E29.1 HYPOGONADISM MALE: ICD-10-CM

## 2019-07-24 DIAGNOSIS — Z99.89 OSA ON CPAP: ICD-10-CM

## 2019-07-24 DIAGNOSIS — E11.65 TYPE 2 DIABETES MELLITUS WITH HYPERGLYCEMIA, WITH LONG-TERM CURRENT USE OF INSULIN (HCC): ICD-10-CM

## 2019-07-24 DIAGNOSIS — E22.1 HYPERPROLACTINEMIA (HCC): ICD-10-CM

## 2019-07-24 LAB
ALBUMIN SERPL-MCNC: 4.2 G/DL (ref 3.5–5.5)
ALBUMIN/CREAT UR: 142.9 MG/G CREAT (ref 0–30)
ALBUMIN/GLOB SERPL: 1.2 {RATIO} (ref 1.2–2.2)
ALP SERPL-CCNC: 60 IU/L (ref 39–117)
ALT SERPL-CCNC: 25 IU/L (ref 0–44)
AST SERPL-CCNC: 21 IU/L (ref 0–40)
BASOPHILS # BLD AUTO: 0 X10E3/UL (ref 0–0.2)
BASOPHILS NFR BLD AUTO: 0 %
BILIRUB SERPL-MCNC: 0.2 MG/DL (ref 0–1.2)
BUN SERPL-MCNC: 8 MG/DL (ref 6–24)
BUN/CREAT SERPL: 9 (ref 9–20)
CALCIUM SERPL-MCNC: 9.9 MG/DL (ref 8.7–10.2)
CHLORIDE SERPL-SCNC: 92 MMOL/L (ref 96–106)
CO2 SERPL-SCNC: 26 MMOL/L (ref 20–29)
CREAT SERPL-MCNC: 0.86 MG/DL (ref 0.76–1.27)
CREAT UR-MCNC: 52.7 MG/DL
EOSINOPHIL # BLD AUTO: 0.2 X10E3/UL (ref 0–0.4)
EOSINOPHIL NFR BLD AUTO: 2 %
ERYTHROCYTE [DISTWIDTH] IN BLOOD BY AUTOMATED COUNT: 14.9 % (ref 12.3–15.4)
GLOBULIN SER CALC-MCNC: 3.5 G/DL (ref 1.5–4.5)
GLUCOSE SERPL-MCNC: 252 MG/DL (ref 65–99)
HCT VFR BLD AUTO: 41.9 % (ref 37.5–51)
HGB BLD-MCNC: 14 G/DL (ref 13–17.7)
IMM GRANULOCYTES # BLD AUTO: 0 X10E3/UL (ref 0–0.1)
IMM GRANULOCYTES NFR BLD AUTO: 0 %
LYMPHOCYTES # BLD AUTO: 2.9 X10E3/UL (ref 0.7–3.1)
LYMPHOCYTES NFR BLD AUTO: 32 %
MCH RBC QN AUTO: 28.1 PG (ref 26.6–33)
MCHC RBC AUTO-ENTMCNC: 33.4 G/DL (ref 31.5–35.7)
MCV RBC AUTO: 84 FL (ref 79–97)
MICROALBUMIN UR-MCNC: 75.3 UG/ML
MONOCYTES # BLD AUTO: 0.6 X10E3/UL (ref 0.1–0.9)
MONOCYTES NFR BLD AUTO: 6 %
NEUTROPHILS # BLD AUTO: 5.5 X10E3/UL (ref 1.4–7)
NEUTROPHILS NFR BLD AUTO: 60 %
PLATELET # BLD AUTO: 431 X10E3/UL (ref 150–450)
POTASSIUM SERPL-SCNC: 5.1 MMOL/L (ref 3.5–5.2)
PROLACTIN SERPL-MCNC: 145.2 NG/ML (ref 4–15.2)
PROT SERPL-MCNC: 7.7 G/DL (ref 6–8.5)
PSA SERPL-MCNC: 0.6 NG/ML (ref 0–4)
RBC # BLD AUTO: 4.99 X10E6/UL (ref 4.14–5.8)
SODIUM SERPL-SCNC: 135 MMOL/L (ref 134–144)
WBC # BLD AUTO: 9.3 X10E3/UL (ref 3.4–10.8)

## 2019-07-24 RX ORDER — METFORMIN HYDROCHLORIDE 500 MG/1
1000 TABLET, EXTENDED RELEASE ORAL 2 TIMES DAILY
Qty: 360 TAB | Refills: 5 | Status: SHIPPED | OUTPATIENT
Start: 2019-07-24 | End: 2020-04-21 | Stop reason: SDUPTHER

## 2019-07-24 NOTE — PROGRESS NOTES
Prolactin is high and need cabergoline 0.25 mg twice a week at bedtime, will need MRI brain too    Rest of labs normal

## 2019-07-25 ENCOUNTER — TELEPHONE (OUTPATIENT)
Dept: ENDOCRINOLOGY | Age: 44
End: 2019-07-25

## 2019-07-25 DIAGNOSIS — E22.1 HYPERPROLACTINEMIA (HCC): Primary | ICD-10-CM

## 2019-07-25 DIAGNOSIS — D35.2 PITUITARY MACROADENOMA (HCC): ICD-10-CM

## 2019-07-25 DIAGNOSIS — E29.1 HYPOGONADISM MALE: ICD-10-CM

## 2019-07-25 RX ORDER — CABERGOLINE 0.5 MG/1
0.25 TABLET ORAL 2 TIMES WEEKLY
Qty: 4 TAB | Refills: 5 | Status: SHIPPED | OUTPATIENT
Start: 2019-07-26 | End: 2021-10-29 | Stop reason: SDUPTHER

## 2019-08-09 ENCOUNTER — HOSPITAL ENCOUNTER (OUTPATIENT)
Dept: MRI IMAGING | Age: 44
Discharge: HOME | End: 2019-08-09
Attending: INTERNAL MEDICINE
Payer: COMMERCIAL

## 2019-08-09 DIAGNOSIS — E22.1 HYPERPROLACTINEMIA (HCC): ICD-10-CM

## 2019-08-09 PROCEDURE — 70553 MRI BRAIN STEM W/O & W/DYE: CPT

## 2019-08-09 PROCEDURE — A9575 INJ GADOTERATE MEGLUMI 0.1ML: HCPCS | Performed by: INTERNAL MEDICINE

## 2019-08-09 PROCEDURE — 74011250636 HC RX REV CODE- 250/636: Performed by: INTERNAL MEDICINE

## 2019-08-09 RX ORDER — GADOTERATE MEGLUMINE 376.9 MG/ML
20 INJECTION INTRAVENOUS
Status: COMPLETED | OUTPATIENT
Start: 2019-08-09 | End: 2019-08-09

## 2019-08-09 RX ADMIN — GADOTERATE MEGLUMINE 20 ML: 376.9 INJECTION INTRAVENOUS at 09:40

## 2019-09-05 DIAGNOSIS — E29.1 HYPOGONADISM MALE: ICD-10-CM

## 2019-09-05 DIAGNOSIS — E78.5 HYPERLIPIDEMIA: ICD-10-CM

## 2019-09-05 DIAGNOSIS — E22.1 HYPERPROLACTINEMIA (HCC): ICD-10-CM

## 2019-09-05 DIAGNOSIS — I10 HTN (HYPERTENSION): ICD-10-CM

## 2019-09-05 DIAGNOSIS — N52.9 ED (ERECTILE DYSFUNCTION): ICD-10-CM

## 2019-09-05 RX ORDER — TADALAFIL 20 MG/1
20 TABLET ORAL AS NEEDED
Qty: 6 TAB | Refills: 6 | Status: SHIPPED | OUTPATIENT
Start: 2019-09-05 | End: 2020-09-10

## 2019-10-31 ENCOUNTER — OFFICE VISIT (OUTPATIENT)
Dept: ENDOCRINOLOGY | Age: 44
End: 2019-10-31

## 2019-10-31 VITALS
OXYGEN SATURATION: 96 % | DIASTOLIC BLOOD PRESSURE: 61 MMHG | WEIGHT: 315 LBS | TEMPERATURE: 98.1 F | BODY MASS INDEX: 46.65 KG/M2 | RESPIRATION RATE: 14 BRPM | SYSTOLIC BLOOD PRESSURE: 110 MMHG | HEIGHT: 69 IN | HEART RATE: 69 BPM

## 2019-10-31 DIAGNOSIS — Z79.4 TYPE 2 DIABETES MELLITUS WITH HYPERGLYCEMIA, WITH LONG-TERM CURRENT USE OF INSULIN (HCC): ICD-10-CM

## 2019-10-31 DIAGNOSIS — E11.65 TYPE 2 DIABETES MELLITUS WITH HYPERGLYCEMIA, WITH LONG-TERM CURRENT USE OF INSULIN (HCC): ICD-10-CM

## 2019-10-31 DIAGNOSIS — E22.1 HYPERPROLACTINEMIA (HCC): ICD-10-CM

## 2019-10-31 DIAGNOSIS — D35.2 PITUITARY MACROADENOMA (HCC): Primary | ICD-10-CM

## 2019-10-31 DIAGNOSIS — E11.65 TYPE 2 DIABETES MELLITUS WITH HYPERGLYCEMIA, WITH LONG-TERM CURRENT USE OF INSULIN (HCC): Primary | ICD-10-CM

## 2019-10-31 DIAGNOSIS — E29.1 HYPOGONADISM MALE: ICD-10-CM

## 2019-10-31 DIAGNOSIS — Z79.4 TYPE 2 DIABETES MELLITUS WITH HYPERGLYCEMIA, WITH LONG-TERM CURRENT USE OF INSULIN (HCC): Primary | ICD-10-CM

## 2019-10-31 DIAGNOSIS — E78.2 MIXED HYPERLIPIDEMIA: ICD-10-CM

## 2019-10-31 LAB
GLUCOSE POC: 101 MG/DL
HBA1C MFR BLD HPLC: 6.7 %

## 2019-10-31 NOTE — PROGRESS NOTES
Tanisha Melgoza is a 38 y.o. male here for   Chief Complaint   Patient presents with    Diabetes    Hyperprolactinemia    Hypogonadism       Functional glucose monitor and record keeping system? -yes   Eye exam within last year? - [place]  Foot exam within last year? - on file    1. Have you been to the ER, urgent care clinic since your last visit? Hospitalized since your last visit? -no    2. Have you seen or consulted any other health care providers outside of the [address] [place] [address] since your last visit? Include any pap smears or colon screening. -PCP

## 2019-10-31 NOTE — PROGRESS NOTES
[facility] DIABETES AND ENDOCRINOLOGY               Noma Charon ,MD Severa Pike  1975          History of Present Illness: Ravi Bejarano is a 44 y.o. male here for follow-up  He is taking the medication consistently  Checking the blood glucose twice daily, reviewed the log, most of them are at goal      No hypoglycemia    Complains of tingling in the feet    TROY - on Bipap    Type 2 Diabetes was diagnosed in 6yrs ago . extended-release, insurance did not cover Januvia  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 prolactin    MRI 2016 Sep        Wt Readings from Last 3 Encounters:   10/31/19 335 lb (152 kg)   07/23/19 333 lb 6.4 oz (151.2 kg)   02/19/18 320 lb 9.6 oz (145.4 kg)       BP Readings from Last 3 Encounters:   10/31/19 110/61   07/23/19 165/87   02/19/18 132/64           Past Medical History:   Diagnosis Date    Anxiety     Arthritis     Diabetic neuropathy ([facility]     DM (diabetes mellitus) ([address]     GERD (gastroesophageal reflux disease)     HTN (hypertension)     Hyperprolactinemia ([address]     Hypogonadotropic hypogonadism ([address]     TROY on CPAP     Pituitary macroadenoma ([facility] [address] December 2014     Current Outpatient Medications   Medication Sig    tadalafil (CIALIS) 20 mg tablet Take 1 Tab by mouth as needed (every 5 days). Every 5 days    cabergoline (DOSTINEX) 0.5 mg tablet Take 0.5 Tabs by mouth two (2) times a week.  metFORMIN ER (GLUCOPHAGE XR) 500 mg tablet Take 2 Tabs by mouth two (2) times a day.  lisinopril-hydroCHLOROthiazide (PRINZIDE) 20-12.5 mg per tablet 1 tab in AM    emtricitabine-tenofovir, TDF, (TRUVADA) 200-300 mg per tablet Take  by mouth daily.  pravastatin (PRAVACHOL) 20 mg tablet Take 1 Tab by mouth nightly.     testosterone cypionate (DEPOTESTOTERONE CYPIONATE) 200 mg/mL injection INJECT 1.25 ML INTO THE MUSCLE EVERY 2 WEEKS    glucose blood VI test strips (ONETOUCH VERIO) strip Test TID Dx Code: E11.65    insulin glargine (BASAGLAR KWIKPEN U-100 INSULIN) 100 unit/mL (3 mL) inpn 75 units twice a day    Insulin Needles, Disposable, 31 gauge x 5/16\" ndle Use to inject insulin BID. Dx code E11.65    Blood-Glucose Meter (ONETOUCH VERIO FLEX) misc Test TID Dx Code: E11.65    Syringe, Disposable, 3 mL syrg Once Q 2 weeks    Needle, Disp, 22 G 22 gauge x 1 1/2\" ndle Q 2 weeks    lancets (ONE TOUCH DELICA) 33 gauge misc Test TID Dx Code: E11.65    Insulin Needles, Disposable, 31 gauge x 5/16\" ndle Use to inject insulin 5x daily Dx Code: E11.65    ALPRAZolam (XANAX) 0.25 mg tablet Take 1 tablet by mouth two (2) times daily as needed for Anxiety. No current facility-administered medications for this visit. Allergies   Allergen Reactions    Amoxicillin Hives    Erythromycin Other (comments)     Stomach cramps    Penicillins Hives    Sulfa (Sulfonamide Antibiotics) Hives         Review of Systems:  - Constitutional Symptoms: no fevers, no chills  - Eyes: no blurry vision no double vision  - Cardiovascular: no chest pain ,no palpitations  - Respiratory: no cough no shortness of breath  - Gastrointestinal: no dysphagia no  abdominal pain  - Musculoskeletal: no joint pains ,  weakness  - Integumentary: no rashes  - Neurological: no numbness, tingling, no  headaches  -     Physical Examination:   Blood pressure 110/61, pulse 69, temperature 98.1 °F (36.7 °C), temperature source Oral, resp. rate 14, height 5' 9\" (1.753 m), weight 335 lb (152 kg), SpO2 96 %. Estimated body mass index is 49.47 kg/m² as calculated from the following:    Height as of this encounter: 5' 9\" (1.753 m). -   Weight as of this encounter: 335 lb (152 kg).   - General: pleasant, no distress, good eye contact  - HEENT: no pallor, no periorbital edema, EOMI  - Neck: supple, no thyromegaly, thick neck  - Cardiovascular: regular, normal rate, normal S1 and S2  - Respiratory: clear to auscultation bilaterally  - Gastrointestinal: soft, nontender, nondistended,  BS +  - Musculoskeletal:,alert and oriented  - Psychiatric: normal mood and affect  - Skin: color, texture, turgor normal.     Diabetic foot exam: July 2019    Left:     Vibratory sensation decreased   Filament test normal sensation with micro filament   Pulse DP: 1+    Deformities: None  Right:    Vibratory sensation decreased   Filament test normal sensation with micro filament   Pulse DP: 1+   Deformities: None      Data Reviewed:       Lab Results   Component Value Date/Time    WBC 9.3 07/23/2019 02:06 PM    HGB 14.0 07/23/2019 02:06 PM    HCT 41.9 07/23/2019 02:06 PM    PLATELET 142 83/35/3781 02:06 PM    MCV 84 07/23/2019 02:06 PM     Lab Results   Component Value Date/Time    Hemoglobin A1c 11.2 (H) 02/13/2018 02:00 PM    Hemoglobin A1c 10.7 (H) 11/18/2014 09:57 AM    Hemoglobin A1c 9.4 (H) 07/10/2014 03:42 PM    Microalb/Creat ratio (ug/mg creat.) 142.9 (H) 07/23/2019 02:06 PM    LDL, calculated 110 (H) 02/13/2018 02:00 PM    Creatinine 0.86 07/23/2019 02:06 PM      Lab Results   Component Value Date/Time    Cholesterol, total 169 02/13/2018 02:00 PM    HDL Cholesterol 29 (L) 02/13/2018 02:00 PM    LDL, calculated 110 (H) 02/13/2018 02:00 PM    Triglyceride 149 02/13/2018 02:00 PM     Lab Results   Component Value Date/Time    AST (SGOT) 21 07/23/2019 02:06 PM    Alk.  phosphatase 60 07/23/2019 02:06 PM     Lab Results   Component Value Date/Time    GFR est  07/23/2019 02:06 PM    GFR est non- 07/23/2019 02:06 PM    Creatinine 0.86 07/23/2019 02:06 PM    BUN 8 07/23/2019 02:06 PM    Sodium 135 07/23/2019 02:06 PM    Potassium 5.1 07/23/2019 02:06 PM    Chloride 92 (L) 07/23/2019 02:06 PM    CO2 26 07/23/2019 02:06 PM      Lab Results   Component Value Date/Time    Prostate Specific Ag 0.6 07/23/2019 02:06 PM    Prostate Specific Ag 0.2 02/19/2018 03:02 PM    Prostate Specific Ag 0.9 11/18/2014 09:57 AM     Lab Results   Component Value Date/Time    TSH 1.800 08/08/2013 11:35 AM    T4, Free 1.38 01/02/2014 08:42 AM      Component      Latest Ref Rng 8/8/2013   TESTOSTERONE, TOTAL, LC/MS      348.0 - 1197.0 ng/dL 15.4 (L)   TESTOSTERONE, FREE      5.00 - 21.00 ng/dL 0.67 (L)   % Free testosterone      1.50 - 4.20 % 4.36 (H)   Luteinizing hormone      1.7 - 8.6 mIU/mL 0.5 (L)   FSH      1.5 - 12.4 mIU/mL 0.8 (L)   Prolactin      4.0 - 15.2 ng/mL 130.7 (H)      Lab Results   Component Value Date/Time    Testosterone 206 (L) 07/10/2014 03:42 PM     Lab Results   Component Value Date/Time    FSH 0.8 (L) 08/08/2013 11:35 AM    Luteinizing hormone 0.5 (L) 08/08/2013 11:35 AM    Prolactin 145.2 (H) 07/23/2019 02:06 PM           Lab Results   Component Value Date/Time    Hemoglobin A1c 11.2 (H) 02/13/2018 02:00 PM    Hemoglobin A1c 10.7 (H) 11/18/2014 09:57 AM    Hemoglobin A1c 9.4 (H) 07/10/2014 03:42 PM    Microalb/Creat ratio (ug/mg creat.) 142.9 (H) 07/23/2019 02:06 PM    LDL, calculated 110 (H) 02/13/2018 02:00 PM    Creatinine 0.86 07/23/2019 02:06 PM      MRI 2014: normal      Assessment/Plan:     1. Type 2 Diabetes Mellitus  Lab Results   Component Value Date/Time    Hemoglobin A1c 11.2 (H) 02/13/2018 02:00 PM    Hemoglobin A1c (POC) 6.7 10/31/2019 09:36 AM     Controlled  Metformin XR  Insulin glargine insulin 75 units BID  Trulicity  Was on farxiga   FLU annually ,Pneumovax ,aspirin daily,annual eye exam,microalbumin. 2. Hyperlipidemia: Low-cholesterol diet  Statin    3. HTN :prinzide    4. TROY- on CPAP    5. 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 Macroadenoma 1.6 cm -prolactin secreting  MRI pituitary in August 2014 -normal MRI  MRI 2016: Small microadenoma  MRI 2019: No adenoma        [place] Staff, MD      Patient verbalized understanding

## 2019-10-31 NOTE — LETTER
11/2/19 Patient: Bonnie Hassan YOB: 1975 Date of Visit: 10/31/2019 Katerina Mckeon MD 
[id number] [id number] VIA Facsimile: [phone] Dear Katerina Mckeon MD, Thank you for referring Mr. Cristopher Christie to [address] [place] [address] for evaluation. My notes for this consultation are attached. If you have questions, please do not hesitate to call me. I look forward to following your patient along with you. Sincerely, Erik Suarez MD

## 2019-11-26 ENCOUNTER — OFFICE VISIT (OUTPATIENT)
Dept: FAMILY MEDICINE CLINIC | Age: 44
End: 2019-11-26

## 2019-11-26 VITALS
WEIGHT: 315 LBS | TEMPERATURE: 98 F | SYSTOLIC BLOOD PRESSURE: 132 MMHG | OXYGEN SATURATION: 96 % | HEIGHT: 69 IN | BODY MASS INDEX: 46.65 KG/M2 | HEART RATE: 82 BPM | DIASTOLIC BLOOD PRESSURE: 82 MMHG | RESPIRATION RATE: 20 BRPM

## 2019-11-26 DIAGNOSIS — Z99.89 OSA ON CPAP: ICD-10-CM

## 2019-11-26 DIAGNOSIS — E11.319 TYPE 2 DIABETES MELLITUS WITH RETINOPATHY, WITHOUT LONG-TERM CURRENT USE OF INSULIN, MACULAR EDEMA PRESENCE UNSPECIFIED, UNSPECIFIED LATERALITY, UNSPECIFIED RETINOPATHY SEVERITY (HCC): ICD-10-CM

## 2019-11-26 DIAGNOSIS — G47.33 OSA ON CPAP: ICD-10-CM

## 2019-11-26 DIAGNOSIS — F33.1 MODERATE EPISODE OF RECURRENT MAJOR DEPRESSIVE DISORDER (HCC): ICD-10-CM

## 2019-11-26 DIAGNOSIS — H69.81 DYSFUNCTION OF RIGHT EUSTACHIAN TUBE: ICD-10-CM

## 2019-11-26 DIAGNOSIS — I10 ESSENTIAL HYPERTENSION: Primary | ICD-10-CM

## 2019-11-26 RX ORDER — FLUTICASONE PROPIONATE 50 MCG
2 SPRAY, SUSPENSION (ML) NASAL DAILY
Qty: 1 BOTTLE | Refills: 5 | Status: SHIPPED | OUTPATIENT
Start: 2019-11-26 | End: 2021-06-19

## 2019-11-26 RX ORDER — BUPROPION HYDROCHLORIDE 150 MG/1
150 TABLET, EXTENDED RELEASE ORAL 2 TIMES DAILY
Qty: 60 TAB | Refills: 3 | Status: SHIPPED | OUTPATIENT
Start: 2019-11-26 | End: 2020-01-27 | Stop reason: ALTCHOICE

## 2019-11-26 NOTE — PATIENT INSTRUCTIONS

## 2019-11-26 NOTE — PROGRESS NOTES
Darci Nassar is a 38 y.o. male    Chief Complaint   Patient presents with    New Patient    Establish Care    Sinus Infection     1. Have you been to the ER, urgent care clinic since your last visit? Hospitalized since your last visit? No    2. Have you seen or consulted any other health care providers outside of the [address] [place] [address] since your last visit? Include any pap smears or colon screening.  No

## 2019-11-27 NOTE — PROGRESS NOTES
Subjective:     Jesus Regalado is a 38 y.o. male presents to establish care and for evaluation of diabetes, hypertension, hyperlipidemia, obesity, depression, and right ear pain. Diabetes is managed by endocrine ( [facility]), reports last visit at the end of October with A1C 6.7. current treatment regimen includes trulicity, metformin, and basaglar. Reports recent dx of diabetic retinopathy, now seeing specialist at [address] [place] [address]. He also sees a podiatrist regularly. Diabetic Review of Systems - medication compliance: compliant all of the time, diabetic diet compliance: compliant most of the time, home glucose monitoring: is performed regularly, Patient did not bring glucose log to this visit. , further diabetic ROS: no polyuria or polydipsia, no chest pain, dyspnea or TIA's, no unusual visual symptoms, no hypoglycemia, no medication side effects noted, last eye exam approximately 2 months ago, acute symptoms are none. Cardiovascular risk analysis - LDL goal is under 70  diabetic  hypertension  hyperlipidemia  obese  sedentary lifestyle. Compliance with treatment thus far has been good. ROS: taking medications as instructed, no medication side effects noted. Diet and Lifestyle: generally follows a low fat low cholesterol diet, generally follows a low sodium diet, follows a diabetic diet regularly, sedentary, nonsmoker  Home BP Monitoring: is not measured at home    Cardiovascular ROS: taking medications as instructed, no medication side effects noted, no TIA's, no chest pain on exertion, no dyspnea on exertion, no swelling of ankles, no orthostatic dizziness or lightheadedness, no orthopnea or paroxysmal nocturnal dyspnea, no palpitations, no intermittent claudication symptoms. Other symptoms and concerns: reports increasing symptoms of depression for the past few months, including sleep problems, weight gain, anhedonia, depressed mood, difficulty concentrating, and increased tearfulness.  Has taken bupropion in the past with good response, would like to restart this. Denies thoughts of harming self or others. C/o right ear pain, pressure, and popping for the past 2-3 weeks. Symptoms began with upper respiratory infection, nasal congestion and frequent cough. No fever or chills. No drainage from ear. Muffled hearing noted. No symptoms left ear. Has tried no medication or other treatment for the symptoms.      Patient Active Problem List   Diagnosis Code    Type II or unspecified type diabetes mellitus without mention of complication, uncontrolled E11.65    HTN (hypertension) I10    TROY on CPAP G47.33, Z99.89    Hypogonadism male E29.1    Hyperprolactinemia (HCC) E22.1    Hyperlipidemia E78.5    Other and unspecified anterior pituitary hyperfunction E22.9    Pituitary macroadenoma (HCC) D35.2    Obesity, morbid ([address] E66.01     Allergies   Allergen Reactions    Amoxicillin Hives    Erythromycin Other (comments)     Stomach cramps    Penicillins Hives    Sulfa (Sulfonamide Antibiotics) Hives     Past Medical History:   Diagnosis Date    Anxiety     Arthritis     Diabetic neuropathy ([address]     DM (diabetes mellitus) (HCC)     GERD (gastroesophageal reflux disease)     HTN (hypertension)     Hyperprolactinemia (HCC)     Hypogonadotropic hypogonadism (HCC)     TROY on CPAP     Pituitary macroadenoma ([address] December 2014     Past Surgical History:   Procedure Laterality Date    HX COLONOSCOPY  7/14    HX WISDOM TEETH EXTRACTION       Family History   Problem Relation Age of Onset    Hypertension Mother     Diabetes Father     Hypertension Brother     Diabetes Maternal Grandfather     Diabetes Paternal Grandfather      Social History     Tobacco Use    Smoking status: Never Smoker    Smokeless tobacco: Never Used   Substance Use Topics    Alcohol use: Yes        Lab Results   Component Value Date/Time    WBC 9.3 07/23/2019 02:06 PM    HGB 14.0 07/23/2019 02:06 PM    HCT 41.9 07/23/2019 02:06 PM    PLATELET 653 89/44/4802 02:06 PM    MCV 84 07/23/2019 02:06 PM     Lab Results   Component Value Date/Time    Hemoglobin A1c 11.2 (H) 02/13/2018 02:00 PM    Hemoglobin A1c 10.7 (H) 11/18/2014 09:57 AM    Hemoglobin A1c 9.4 (H) 07/10/2014 03:42 PM    Glucose 252 (H) 07/23/2019 02:06 PM    Glucose  10/31/2019 09:36 AM    Microalb/Creat ratio (ug/mg creat.) 142.9 (H) 07/23/2019 02:06 PM    LDL, calculated 110 (H) 02/13/2018 02:00 PM    Creatinine (POC) 1.0 11/06/2014 04:04 PM    Creatinine 0.86 07/23/2019 02:06 PM      Lab Results   Component Value Date/Time    Cholesterol, total 169 02/13/2018 02:00 PM    HDL Cholesterol 29 (L) 02/13/2018 02:00 PM    LDL, calculated 110 (H) 02/13/2018 02:00 PM    Triglyceride 149 02/13/2018 02:00 PM     Lab Results   Component Value Date/Time    ALT (SGPT) 25 07/23/2019 02:06 PM    AST (SGOT) 21 07/23/2019 02:06 PM    Alk. phosphatase 60 07/23/2019 02:06 PM    Bilirubin, total 0.2 07/23/2019 02:06 PM    Albumin 4.2 07/23/2019 02:06 PM    Protein, total 7.7 07/23/2019 02:06 PM    PLATELET 855 57/72/9551 02:06 PM     Lab Results   Component Value Date/Time    GFR est non- 07/23/2019 02:06 PM    GFRNA, POC >60 11/06/2014 04:04 PM    GFR est  07/23/2019 02:06 PM    GFRAA, POC >60 11/06/2014 04:04 PM    Creatinine 0.86 07/23/2019 02:06 PM    Creatinine (POC) 1.0 11/06/2014 04:04 PM    BUN 8 07/23/2019 02:06 PM    Sodium 135 07/23/2019 02:06 PM    Potassium 5.1 07/23/2019 02:06 PM    Chloride 92 (L) 07/23/2019 02:06 PM    CO2 26 07/23/2019 02:06 PM     Lab Results   Component Value Date/Time    TSH 1.800 08/08/2013 11:35 AM    T4, Free 1.38 01/02/2014 08:42 AM         Review of Systems  A comprehensive review of systems was negative except for that written in the HPI.     Objective:     Visit Vitals  /82 (BP 1 Location: Right arm, BP Patient Position: Sitting)   Pulse 82   Temp 98 °F (36.7 °C) (Oral)   Resp 20   Ht 5' 9\" (1.753 m)   Wt 339 lb (153.8 kg) SpO2 96%   BMI 50.06 kg/m²     Physical Examination: General appearance - alert, well appearing, and in no distress, oriented to person, place, and time and well hydrated  Mental status - normal mood, behavior, speech, dress, motor activity, and thought processes  Eyes - sclera anicteric  Ears - ceruminosis noted left external canal; right external canal normal, tm bulging without erythema or dullness  Nose - normal and patent, no erythema, discharge or polyps and sinuses normal and nontender  Mouth - mucous membranes moist, pharynx normal without lesions, dental hygiene good and tongue normal  Neck - supple, no significant adenopathy, thyroid exam: thyroid is normal in size without nodules or tenderness  Chest - clear to auscultation, no wheezes, rales or rhonchi, symmetric air entry  Heart - normal rate, regular rhythm, normal S1, S2, no murmurs, rubs, clicks or gallops, normal bilateral carotid upstroke without bruits, no JVD  Abdomen - soft, nontender, nondistended, no masses or organomegaly  bowel sounds normal  Neurological - alert, oriented, normal speech, no focal findings or movement disorder noted  Extremities - no pedal edema noted  Skin - normal coloration and turgor, no rashes, no suspicious skin lesions noted      Assessment/Plan:     Diabetic issues reviewed with him: low cholesterol diet, weight control and daily exercise discussed, home glucose monitoring emphasized, all medications, side effects and compliance discussed carefully, foot care discussed and Podiatry visits discussed, annual eye examinations at Ophthalmology discussed, glycohemoglobin and other lab monitoring discussed and long term diabetic complications discussed. Diagnoses and all orders for this visit:    1. Essential hypertension  near goal  Continue prinzide  DASH diet  Weight loss  150 minutes of moderate intensity exercise weekly    2.  Type 2 diabetes mellitus with retinopathy, without long-term current use of insulin, macular edema presence unspecified, unspecified laterality, unspecified retinopathy severity ([address]  At goal on basaglar, trulicity and metformin  Encouraged compliance with recommended diet, increased physical activity, and weihtt loss    3. Moderate episode of recurrent major depressive disorder (HCC)  Add rx, monitor response  Consider counseling  -     buPROPion SR (WELLBUTRIN SR) 150 mg SR tablet; Take 1 Tab by mouth two (2) times a day. 4. Dysfunction of right eustachian tube  Add rx  -     fluticasone propionate (FLONASE) 50 mcg/actuation nasal spray; 2 Sprays by Both Nostrils route daily. 5. TROY on CPAP  Encouraged continued compliance with cpap as prescribed    6. BMI 50.0-59.9, adult ([facility]  I have reviewed/discussed the above normal BMI with the patient. I have recommended the following interventions: dietary management education, guidance, and counseling, encourage exercise and monitor weight . Patient is interested in medical bariatric care/weight loss. Encouraged him to set up appt with Dr. Steff Steel to get started. Bupropion may be helpful with this        Follow-up and Dispositions    · Return in about 8 weeks (around 1/21/2020), or if symptoms worsen or fail to improve, for f/u depression. needs fasting lipids at next appt. I have discussed the diagnosis with the patient and the intended plan as seen in the above orders. The patient has received an after-visit summary and questions were answered concerning future plans. Patient conveyed understanding of the plan at the time of the visit.     Jarrett Alamo NP  11/26/19

## 2020-01-27 ENCOUNTER — OFFICE VISIT (OUTPATIENT)
Dept: FAMILY MEDICINE CLINIC | Age: 45
End: 2020-01-27

## 2020-01-27 ENCOUNTER — HOSPITAL ENCOUNTER (OUTPATIENT)
Dept: LAB | Age: 45
Discharge: HOME | End: 2020-01-27

## 2020-01-27 VITALS
DIASTOLIC BLOOD PRESSURE: 79 MMHG | TEMPERATURE: 98.2 F | WEIGHT: 315 LBS | OXYGEN SATURATION: 96 % | BODY MASS INDEX: 46.65 KG/M2 | RESPIRATION RATE: 20 BRPM | SYSTOLIC BLOOD PRESSURE: 132 MMHG | HEART RATE: 73 BPM | HEIGHT: 69 IN

## 2020-01-27 DIAGNOSIS — Z79.899 ON PRE-EXPOSURE PROPHYLAXIS FOR HIV: ICD-10-CM

## 2020-01-27 DIAGNOSIS — Z72.51 HIGH RISK SEXUAL BEHAVIOR, UNSPECIFIED TYPE: ICD-10-CM

## 2020-01-27 DIAGNOSIS — Z79.899 LONG TERM USE OF DRUG: ICD-10-CM

## 2020-01-27 DIAGNOSIS — F33.1 MODERATE EPISODE OF RECURRENT MAJOR DEPRESSIVE DISORDER (HCC): ICD-10-CM

## 2020-01-27 DIAGNOSIS — F41.1 GAD (GENERALIZED ANXIETY DISORDER): ICD-10-CM

## 2020-01-27 DIAGNOSIS — I10 ESSENTIAL HYPERTENSION: Primary | ICD-10-CM

## 2020-01-27 RX ORDER — DULOXETIN HYDROCHLORIDE 30 MG/1
30 CAPSULE, DELAYED RELEASE ORAL DAILY
Qty: 90 CAP | Refills: 0 | Status: SHIPPED | OUTPATIENT
Start: 2020-01-27 | End: 2020-03-11

## 2020-01-27 NOTE — PATIENT INSTRUCTIONS
Recovering From Depression: Care Instructions  Your Care Instructions    Taking good care of yourself is important as you recover from depression. In time, your symptoms will fade as your treatment takes hold. Do not give up. Instead, focus your energy on getting better. Your mood will improve. It just takes some time. Focus on things that can help you feel better, such as being with friends and family, eating well, and getting enough rest. But take things slowly. Do not do too much too soon. You will begin to feel better gradually. Follow-up care is a key part of your treatment and safety. Be sure to make and go to all appointments, and call your doctor if you are having problems. It's also a good idea to know your test results and keep a list of the medicines you take. How can you care for yourself at home? Be realistic  · If you have a large task to do, break it up into smaller steps you can handle, and just do what you can. · You may want to put off important decisions until your depression has lifted. If you have plans that will have a major impact on your life, such as marriage, divorce, or a job change, try to wait a bit. Talk it over with friends and loved ones who can help you look at the overall picture first.  · Reaching out to people for help is important. Do not isolate yourself. Let your family and friends help you. Find someone you can trust and confide in, and talk to that person. · Be patient, and be kind to yourself. Remember that depression is not your fault and is not something you can overcome with willpower alone. Treatment is necessary for depression, just like for any other illness. Feeling better takes time, and your mood will improve little by little. Stay active  · Stay busy and get outside. Take a walk, or try some other light exercise. · Talk with your doctor about an exercise program. Exercise can help with mild depression. · Go to a movie or concert.  Take part in a [employer] activity or other social gathering. Go to a [employer] game. · Ask a friend to have dinner with you. Take care of yourself  · Eat a balanced diet with plenty of fresh fruits and vegetables, whole grains, and lean protein. If you have lost your appetite, eat small snacks rather than large meals. · Avoid drinking alcohol or using illegal drugs. Do not take medicines that have not been prescribed for you. They may interfere with medicines you may be taking for depression, or they may make your depression worse. · Take your medicines exactly as they are prescribed. You may start to feel better within 1 to 3 weeks of taking antidepressant medicine. But it can take as many as 6 to 8 weeks to see more improvement. If you have questions or concerns about your medicines, or if you do not notice any improvement by 3 weeks, talk to your doctor. · If you have any side effects from your medicine, tell your doctor. Antidepressants can make you feel tired, dizzy, or nervous. Some people have dry mouth, constipation, headaches, sexual problems, or diarrhea. Many of these side effects are mild and will go away on their own after you have been taking the medicine for a few weeks. Some may last longer. Talk to your doctor if side effects are bothering you too much. You might be able to try a different medicine. · Get enough sleep. If you have problems sleeping:  ? Go to bed at the same time every night, and get up at the same time every morning. ? Keep your bedroom dark and quiet. ? Do not exercise after 5:00 p.m.  ? Avoid drinks with caffeine after 5:00 p.m. · Avoid sleeping pills unless they are prescribed by the doctor treating your depression. Sleeping pills may make you groggy during the day, and they may interact with other medicine you are taking. · If you have any other illnesses, such as diabetes, heart disease, or high blood pressure, make sure to continue with your treatment.  Tell your doctor about all of the medicines you take, including those with or without a prescription. · Keep the numbers for these national suicide hotlines: 7-012-001-TALK (8-604.296.7418) and 2-986-UNZFJXY (3-774.145.8953). If you or someone you know talks about suicide or feeling hopeless, get help right away. When should you call for help? Call 911 anytime you think you may need emergency care. For example, call if:    · You feel like hurting yourself or someone else.     · Someone you know has depression and is about to attempt or is attempting suicide.   [facility] your doctor now or seek immediate medical care if:    · You hear voices.     · Someone you know has depression and:  ? Starts to give away his or her possessions. ? Uses illegal drugs or drinks alcohol heavily. ? Talks or writes about death, including writing suicide notes or talking about guns, knives, or pills. ? Starts to spend a lot of time alone. ? Acts very aggressively or suddenly appears calm.    Watch closely for changes in your health, and be sure to contact your doctor if:    · You do not get better as expected. Where can you learn more? Go to http://shin-jacob.info/. Enter M566 in the search box to learn more about \"Recovering From Depression: Care Instructions. \"  Current as of: May 28, 2019  Content Version: 12.2  © 2133-7344 [employer]. Care instructions adapted under license by [employer] (which disclaims liability or warranty for this information). If you have questions about a medical condition or this instruction, always ask your healthcare professional. [place] [address] any warranty or liability for your use of this information. Anxiety Disorder: Care Instructions  Your Care Instructions    Anxiety is a normal reaction to stress. Difficult situations can cause you to have symptoms such as sweaty palms and a nervous feeling. In an anxiety disorder, the symptoms are far more severe.  Constant worry, muscle tension, trouble sleeping, nausea and diarrhea, and other symptoms can make normal daily activities difficult or impossible. These symptoms may occur for no reason, and they can affect your work, school, or social life. Medicines, counseling, and self-care can all help. Follow-up care is a key part of your treatment and safety. Be sure to make and go to all appointments, and call your doctor if you are having problems. It's also a good idea to know your test results and keep a list of the medicines you take. How can you care for yourself at home? · Take medicines exactly as directed. Call your doctor if you think you are having a problem with your medicine. · Go to your counseling sessions and follow-up appointments. · Recognize and accept your anxiety. Then, when you are in a situation that makes you anxious, say to yourself, \"This is not an emergency. I feel uncomfortable, but I am not in danger. I can keep going even if I feel anxious. \"  · Be kind to your body:  ? Relieve tension with exercise or a massage. ? Get enough rest.  ? Avoid alcohol, caffeine, nicotine, and illegal drugs. They can increase your anxiety level and cause sleep problems. ? Learn and do relaxation techniques. See below for more about these techniques. · Engage your mind. Get out and do something you enjoy. Go to a funny movie, or take a walk or hike. Plan your day. Having too much or too little to do can make you anxious. · Keep a record of your symptoms. Discuss your fears with a good friend or family member, or join a support group for people with similar problems. Talking to others sometimes relieves stress. · Get involved in social groups, or volunteer to help others. Being alone sometimes makes things seem worse than they are. · Get at least 30 minutes of exercise on most days of the week to relieve stress. Walking is a good choice.  You also may want to do other activities, such as running, swimming, cycling, or playing tennis or team sports. Relaxation techniques  Do relaxation exercises 10 to 20 minutes a day. You can play soothing, relaxing music while you do them, if you wish. · Tell others in your house that you are going to do your relaxation exercises. Ask them not to disturb you. · Find a comfortable place, away from all distractions and noise. · Lie down on your back, or sit with your back straight. · Focus on your breathing. Make it slow and steady. · Breathe in through your nose. Breathe out through either your nose or mouth. · Breathe deeply, filling up the area between your navel and your rib cage. Breathe so that your belly goes up and down. · Do not hold your breath. · Breathe like this for 5 to 10 minutes. Notice the feeling of calmness throughout your whole body. As you continue to breathe slowly and deeply, relax by doing the following for another 5 to 10 minutes:  · Tighten and relax each muscle group in your body. You can begin at your toes and work your way up to your head. · Imagine your muscle groups relaxing and becoming heavy. · Empty your mind of all thoughts. · Let yourself relax more and more deeply. · Become aware of the state of calmness that surrounds you. · When your relaxation time is over, you can bring yourself back to alertness by moving your fingers and toes and then your hands and feet and then stretching and moving your entire body. Sometimes people fall asleep during relaxation, but they usually wake up shortly afterward. · Always give yourself time to return to full alertness before you drive a car or do anything that might cause an accident if you are not fully alert. Never play a relaxation tape while you drive a car. When should you call for help? Call 911 anytime you think you may need emergency care.  For example, call if:    · You feel you cannot stop from hurting yourself or someone else.   Cresenciano Apley the numbers for these national suicide hotlines: 0-512-443-TALK (3-227-606-936.657.6880) and 0-361-SZEVASF (5-615-654-764-152-4118). If you or someone you know talks about suicide or feeling hopeless, get help right away.   Watch closely for changes in your health, and be sure to contact your doctor if:    · You have anxiety or fear that affects your life.     · You have symptoms of anxiety that are new or different from those you had before. Where can you learn more? Go to http://shin-jacob.info/. Enter P754 in the search box to learn more about \"Anxiety Disorder: Care Instructions. \"  Current as of: May 28, 2019  Content Version: 12.2  © 7863-8854 [employer]. Care instructions adapted under license by [employer] (which disclaims liability or warranty for this information). If you have questions about a medical condition or this instruction, always ask your healthcare professional. [place] [address] any warranty or liability for your use of this information. Learning About Taking Medicine to Prevent HIV Infections  Introduction    HIV (human immunodeficiency virus) is a virus that attacks the immune system, the body's natural defense system. Without a strong immune system, the body has trouble fighting off disease. HIV is the virus that causes AIDS. Two common ways to get HIV are:  · Having unprotected sex with someone who has HIV. · Sharing needles with someone who has HIV. These things put you at high risk for getting HIV. If you are at risk, you and your doctor can decide if you can take medicines that may lower your risk. Taking these medicines is called pre-exposure prophylaxis (PrEP). How does PrEP work? PrEP can help prevent an HIV infection from taking hold and spreading in your body. Two medicines are combined in one pill called Truvada. You must take it on schedule for it to help protect you from HIV. PrEP works best if you take the medicine every day.  It doesn't work well if you don't follow the daily schedule. Do not share your medicine with other people. You will have regular visits with your doctor. He or she will check to see how you are doing while taking the medicine. You'll be tested for HIV. Your doctor may also talk to you about other steps you can take to avoid HIV infection. These include practicing safer sex and not injecting illegal drugs with shared needles. What else should you know about PrEP? PrEP does not remove all risk of getting HIV. While you take the medicine, avoid risky actions like having unprotected sex and sharing needles. PrEP can help you have a baby safely when your partner has an HIV infection. It can help prevent the infection from spreading to you or your baby. Your doctor can discuss this and other options with you. If you are infected with HIV, your doctor may give you Truvada along with other medicine to treat HIV. Be safe with medicines. Take your medicines exactly as prescribed. Call your doctor if you think you are having a problem with your medicine. You may be able to pay less for PrEP medicines. Many health insurance plans cover the cost of PrEP. There are programs that provide PrEP for free or at a lower cost for people who need help paying for it. Follow-up care is a key part of your treatment and safety. Be sure to make and go to all appointments, and call your doctor if you are having problems. It's also a good idea to know your test results and keep a list of the medicines you take. Where can you learn more? Go to http://shin-jacob.info/. Enter N328 in the search box to learn more about \"Learning About Taking Medicine to Prevent HIV Infections. \"  Current as of: June 9, 2019  Content Version: 12.2  © 8781-9009 [employer]. Care instructions adapted under license by [employer] (which disclaims liability or warranty for this information).  If you have questions about a medical condition or this instruction, always ask your healthcare professional. [place] [address] any warranty or liability for your use of this information.

## 2020-01-27 NOTE — PROGRESS NOTES
Lucy Holm is a 38 y.o. male    Chief Complaint   Patient presents with    Depression    Follow-up    Medication Evaluation     1. Have you been to the ER, urgent care clinic since your last visit? Hospitalized since your last visit? No    2. Have you seen or consulted any other health care providers outside of the [address] [place] [address] since your last visit? Include any pap smears or colon screening.  No

## 2020-01-27 NOTE — LETTER
NOTIFICATION RETURN TO WORK / SCHOOL 
 
1/27/2020 4:00 PM 
 
Mr. Elena Mcnair [address] [place] [address] To Whom It May Concern: 
 
Elena Mcnair is currently under the care of Greg Elzialde. He will return to work/school on: 01/28/2020 If there are questions or concerns please have the patient contact our office.  
 
 
 
Sincerely, 
 
 
Juan Sam NP

## 2020-01-28 LAB
ANION GAP SERPL CALC-SCNC: 5 MMOL/L (ref 5–15)
BUN SERPL-MCNC: 15 MG/DL (ref 6–20)
BUN/CREAT SERPL: 14 (ref 12–20)
CALCIUM SERPL-MCNC: 9.6 MG/DL (ref 8.5–10.1)
CHLORIDE SERPL-SCNC: 101 MMOL/L (ref 97–108)
CO2 SERPL-SCNC: 30 MMOL/L (ref 21–32)
COMMENT, HOLDF: NORMAL
CREAT SERPL-MCNC: 1.09 MG/DL (ref 0.7–1.3)
GLUCOSE SERPL-MCNC: 126 MG/DL (ref 65–100)
HCV AB SERPL QL IA: NONREACTIVE
HCV COMMENT,HCGAC: NORMAL
HIV 1+2 AB+HIV1 P24 AG SERPL QL IA: NONREACTIVE
HIV12 RESULT COMMENT, HHIVC: NORMAL
POTASSIUM SERPL-SCNC: 5.2 MMOL/L (ref 3.5–5.1)
RPR SER QL: NONREACTIVE
SAMPLES BEING HELD,HOLD: NORMAL
SODIUM SERPL-SCNC: 136 MMOL/L (ref 136–145)

## 2020-01-28 NOTE — PROGRESS NOTES
Subjective:      Henrik Florian is a 38 y.o. male who presents for follow up of of depression/anxiety and evaluation of high risk sexual behavior/HIV PreP. Reports modest improvement in depression symptoms with bupropion, but notes no improvement in anxiety symptoms. He denies current suicidal and homicidal plan or intent. Family history significant for nothing. Possible organic causes contributing are: none. Risk factors: none apparent. Previous drug treatment includes Zoloft, Wellbutrin; other therapy: no other therapies. He complains of the following side effects from the treatment: none. In the past SSRIs have not provided good control of symptoms. Has been on HIV PreP (truvada) for over a year through the health dept. Provider he was seeing has left and the clinic is no longer providing this service. He continues to engage in high risk sexual behaviors. He reports last HIV test and labs 4 months ago. Reports good compliance with medication, tolerating well without apparent side effects. No s/s of acute HIV infection. Cardiovascular risk analysis - LDL goal is under 70  diabetic  hypertension  hyperlipidemia  obese  sedentary lifestyle. Compliance with treatment thus far has been good. ROS: taking medications as instructed, no medication side effects noted. Diet and Lifestyle: generally follows a low fat low cholesterol diet, generally follows a low sodium diet, follows a diabetic diet regularly, sedentary, nonsmoker  Home BP Monitoring: is not measured at home    Cardiovascular ROS: taking medications as instructed, no medication side effects noted, no TIA's, no chest pain on exertion, no dyspnea on exertion, no swelling of ankles, no orthostatic dizziness or lightheadedness, no orthopnea or paroxysmal nocturnal dyspnea, no palpitations, no intermittent claudication symptoms.          Problem List:     Patient Active Problem List    Diagnosis Date Noted    Obesity, morbid ([address] 02/19/2018  Pituitary macroadenoma ([facility] 02/15/2014    Other and unspecified anterior pituitary hyperfunction 01/09/2014    Hyperprolactinemia ([facility] 11/09/2013    Hyperlipidemia 11/09/2013    Type II or unspecified type diabetes mellitus without mention of complication, uncontrolled 08/07/2013    HTN (hypertension) 08/07/2013    TROY on CPAP 08/07/2013    Hypogonadism male 08/07/2013     Medical History:     Past Medical History:   Diagnosis Date    Anxiety     Arthritis     Diabetic neuropathy ([facility]     DM (diabetes mellitus) ([facility]     GERD (gastroesophageal reflux disease)     HTN (hypertension)     Hyperprolactinemia (HCC)     Hypogonadotropic hypogonadism ([facility]     TROY on CPAP     Pituitary macroadenoma [facility]) December 2014     Allergies: Allergies   Allergen Reactions    Amoxicillin Hives    Erythromycin Other (comments)     Stomach cramps    Penicillins Hives    Sulfa (Sulfonamide Antibiotics) Hives     Surgical History:     Past Surgical History:   Procedure Laterality Date    HX COLONOSCOPY  7/14    HX WISDOM TEETH EXTRACTION       Social History:     Social History     Socioeconomic History    Marital status: SINGLE     Spouse name: Not on file    Number of children: Not on file    Years of education: Not on file    Highest education level: Not on file   Tobacco Use    Smoking status: Never Smoker    Smokeless tobacco: Never Used   Substance and Sexual Activity    Alcohol use: Yes    Drug use: No    Sexual activity: Yes       Review of Systems  A comprehensive review of systems was negative except for that written in the HPI.     Objective:     Visit Vitals  /79 (BP 1 Location: Right arm, BP Patient Position: Sitting)   Pulse 73   Temp 98.2 °F (36.8 °C) (Oral)   Resp 20   Ht 5' 9\" (1.753 m)   Wt 335 lb 3.2 oz (152 kg)   SpO2 96%   BMI 49.50 kg/m²        General:  alert, cooperative, no distress, appears stated age   Head:  NCAT w/o lesions or tenderness   Mouth:  Lips, mucosa, and tongue normal. Teeth and gums normal   Lungs: clear to auscultation bilaterally   Heart:  regular rate and rhythm, S1, S2 normal, no murmur, click, rub or gallop   Abdomen: soft, non-tender. Bowel sounds normal. No masses,  no organomegaly   Neuro: normal without focal findings  mental status, speech normal, alert and oriented x iii  KI  reflexes normal and symmetric   Mini Mental Status: not indicated     Affect & Behavior:  good grooming, full facial expressions, normal speech pattern and content, normal thought patterns, normal perception, good insight, normal reasoning    Ext: No pedal edema, peripheral pulses intact   Skin: Normal coloration and turgor, no rashes        Assessment/ Plan   Diagnoses and all orders for this visit:    1. Essential hypertension  near goal  Continue prinzide  DASH diet  Weight loss  150 minutes of moderate intensity exercise weekly    2. Moderate episode of recurrent major depressive disorder (HCC)  Wean bupropion (reduce to every day x 1 week, then discontinue), add duloxetine  Monitor response  -     DULoxetine (CYMBALTA) 30 mg capsule; Take 1 Cap by mouth daily. 3. SHIMON (generalized anxiety disorder)  Discontinue bupropion  Add duloxetine  -     DULoxetine (CYMBALTA) 30 mg capsule; Take 1 Cap by mouth daily. 4. High risk sexual behavior, unspecified type  5. On pre-exposure prophylaxis for HIV  Will check recommended labs  Plan to continue truvada rx provided HIV testing is negative and creatinine is normal  -     HIV 1/2 AG/AB, 4TH GENERATION,W RFLX CONFIRM; Future  -     Ivana Ramos / GC-AMPLIFIED; Future  -     METABOLIC PANEL, BASIC; Future  -     HEPATITIS C AB; Future  -     RPR; Future    6. Long term use of drug  -     METABOLIC PANEL, BASIC; Future      Follow-up and Dispositions    · Return in about 6 weeks (around 3/9/2020), or if symptoms worsen or fail to improve, for follow up depression, anxiety.          · Patient Education:  Reviewed concept of anxiety as biochemical imbalance of neurotransmitters and rationale for treatment. Instructed patient to contact office or on-call physician promptly should condition worsen or any new symptoms appear and provided on-call telephone numbers. IF THE PATIENT HAS ANY SUICIDAL OR HOMICIDAL IDEATION, CALL THE OFFICE, DISCUSS WITH A SUPPORT MEMBER OR GO TO THE ER IMMEDIATELY. Patient was agreeable with this plan. I have discussed the diagnosis with the patient and the intended plan as seen in the above orders. The patient has received an after-visit summary and questions were answered concerning future plans. Patient conveyed understanding of the plan at the time of the visit.     James Vallejo NP  01/27/20

## 2020-01-31 LAB
C TRACH RRNA SPEC QL NAA+PROBE: NEGATIVE
N GONORRHOEA RRNA SPEC QL NAA+PROBE: NEGATIVE
SPECIMEN SOURCE: NORMAL

## 2020-02-05 ENCOUNTER — TELEPHONE (OUTPATIENT)
Dept: FAMILY MEDICINE CLINIC | Age: 45
End: 2020-02-05

## 2020-02-05 RX ORDER — EMTRICITABINE AND TENOFOVIR DISOPROXIL FUMARATE 200; 300 MG/1; MG/1
1 TABLET, FILM COATED ORAL DAILY
Qty: 30 TAB | Refills: 2 | Status: SHIPPED | OUTPATIENT
Start: 2020-02-05 | End: 2020-10-05 | Stop reason: SDUPTHER

## 2020-02-05 NOTE — TELEPHONE ENCOUNTER
Returned pt's call. Patient x2 id verified. Pt stated that he was able to download it from my chart and doesn't need anything from us right now.

## 2020-02-05 NOTE — TELEPHONE ENCOUNTER
Contacted pt to inform lab results. Patient x2 id verified. Pt stated that he already got his lab results to his my chart.

## 2020-02-05 NOTE — TELEPHONE ENCOUNTER
Call from Call Center:      [employer]   Phone Number: [phone]             Caller's first and last name: n/a   Reason for call: Patient has requested a copy of the letter for his employer [employer]. Stated that the letter had been misplaced. Provided fax number [phone]. Patient stated that he can access the letter on [employer] but is not able to print it out.    Callback required yes/no and why: yes   Best contact number(s): [phone]   Details to clarify the request: n/a        Thank you

## 2020-02-05 NOTE — PROGRESS NOTES
HIV, hep c, syphilis, gonhorrhea, and chlamydia screenings negative. Kidney function is normal.  Will send rx for truvada to your pharmacy on record. Return for f/u in 3 months to repeat recommended monitoring for PreP regimen.

## 2020-02-25 ENCOUNTER — LAB ONLY (OUTPATIENT)
Dept: ENDOCRINOLOGY | Age: 45
End: 2020-02-25

## 2020-02-25 ENCOUNTER — HOSPITAL ENCOUNTER (OUTPATIENT)
Dept: LAB | Age: 45
Discharge: HOME | End: 2020-02-25

## 2020-02-25 DIAGNOSIS — E78.2 MIXED HYPERLIPIDEMIA: ICD-10-CM

## 2020-02-25 DIAGNOSIS — E29.1 HYPOGONADISM MALE: ICD-10-CM

## 2020-02-25 DIAGNOSIS — E11.65 TYPE 2 DIABETES MELLITUS WITH HYPERGLYCEMIA, WITH LONG-TERM CURRENT USE OF INSULIN (HCC): ICD-10-CM

## 2020-02-25 DIAGNOSIS — E22.1 HYPERPROLACTINEMIA (HCC): ICD-10-CM

## 2020-02-25 DIAGNOSIS — E11.65 TYPE 2 DIABETES MELLITUS WITH HYPERGLYCEMIA, WITH LONG-TERM CURRENT USE OF INSULIN (HCC): Primary | ICD-10-CM

## 2020-02-25 DIAGNOSIS — Z79.4 TYPE 2 DIABETES MELLITUS WITH HYPERGLYCEMIA, WITH LONG-TERM CURRENT USE OF INSULIN (HCC): ICD-10-CM

## 2020-02-25 DIAGNOSIS — Z79.4 TYPE 2 DIABETES MELLITUS WITH HYPERGLYCEMIA, WITH LONG-TERM CURRENT USE OF INSULIN (HCC): Primary | ICD-10-CM

## 2020-02-25 LAB
ALBUMIN SERPL-MCNC: 3.6 G/DL (ref 3.5–5)
ALBUMIN/GLOB SERPL: 0.9 {RATIO} (ref 1.1–2.2)
ALP SERPL-CCNC: 59 U/L (ref 45–117)
ALT SERPL-CCNC: 33 U/L (ref 12–78)
ANION GAP SERPL CALC-SCNC: 4 MMOL/L (ref 5–15)
AST SERPL-CCNC: 21 U/L (ref 15–37)
BILIRUB SERPL-MCNC: 0.2 MG/DL (ref 0.2–1)
BUN SERPL-MCNC: 14 MG/DL (ref 6–20)
BUN/CREAT SERPL: 15 (ref 12–20)
CALCIUM SERPL-MCNC: 9.3 MG/DL (ref 8.5–10.1)
CHLORIDE SERPL-SCNC: 102 MMOL/L (ref 97–108)
CO2 SERPL-SCNC: 28 MMOL/L (ref 21–32)
CREAT SERPL-MCNC: 0.92 MG/DL (ref 0.7–1.3)
CREAT UR-MCNC: 198 MG/DL
EST. AVERAGE GLUCOSE BLD GHB EST-MCNC: 154 MG/DL
GLOBULIN SER CALC-MCNC: 4 G/DL (ref 2–4)
GLUCOSE SERPL-MCNC: 162 MG/DL (ref 65–100)
HBA1C MFR BLD: 7 % (ref 4–5.6)
LDLC SERPL DIRECT ASSAY-MCNC: 116 MG/DL (ref 0–100)
MICROALBUMIN UR-MCNC: 4.96 MG/DL
MICROALBUMIN/CREAT UR-RTO: 25 MG/G (ref 0–30)
POTASSIUM SERPL-SCNC: 4.6 MMOL/L (ref 3.5–5.1)
PROT SERPL-MCNC: 7.6 G/DL (ref 6.4–8.2)
SODIUM SERPL-SCNC: 134 MMOL/L (ref 136–145)

## 2020-02-26 LAB — PROLACTIN SERPL-MCNC: 5 NG/ML

## 2020-03-03 ENCOUNTER — OFFICE VISIT (OUTPATIENT)
Dept: ENDOCRINOLOGY | Age: 45
End: 2020-03-03

## 2020-03-03 VITALS
RESPIRATION RATE: 26 BRPM | OXYGEN SATURATION: 96 % | HEART RATE: 84 BPM | SYSTOLIC BLOOD PRESSURE: 144 MMHG | DIASTOLIC BLOOD PRESSURE: 66 MMHG | TEMPERATURE: 99.8 F | BODY MASS INDEX: 46.65 KG/M2 | HEIGHT: 69 IN | WEIGHT: 315 LBS

## 2020-03-03 DIAGNOSIS — E22.1 HYPERPROLACTINEMIA (HCC): ICD-10-CM

## 2020-03-03 DIAGNOSIS — E29.1 HYPOGONADISM MALE: ICD-10-CM

## 2020-03-03 DIAGNOSIS — E11.65 TYPE 2 DIABETES MELLITUS WITH HYPERGLYCEMIA, WITH LONG-TERM CURRENT USE OF INSULIN (HCC): Primary | ICD-10-CM

## 2020-03-03 DIAGNOSIS — D35.2 PITUITARY MACROADENOMA (HCC): ICD-10-CM

## 2020-03-03 DIAGNOSIS — Z79.4 TYPE 2 DIABETES MELLITUS WITH HYPERGLYCEMIA, WITH LONG-TERM CURRENT USE OF INSULIN (HCC): Primary | ICD-10-CM

## 2020-03-03 NOTE — PROGRESS NOTES
Kathya Gonzalez is a 38 y.o. male here for   Chief Complaint   Patient presents with    Diabetes    Hypogonadism       1. Have you been to the ER, urgent care clinic since your last visit? Hospitalized since your last visit? -no    2. Have you seen or consulted any other health care providers outside of the [address] [place] [address] since your last visit?   Include any pap smears or colon screening.-no

## 2020-03-03 NOTE — PROGRESS NOTES
[facility] DIABETES AND ENDOCRINOLOGY               MD Justina Mercado  1975          History of Present Illness: Arnel Flowers is a 44 y.o. male here for follow-up  He is taking the medication consistently  Checking the blood glucose twice daily, reviewed the log,  No hypoglycemia  Gained weight    Complains of tingling in the feet    TROY - on Bipap    Type 2 Diabetes was diagnosed in 6yrs ago . extended-release, insurance did not cover Januvia  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 prolactin    MRI 2016 Sep        Wt Readings from Last 3 Encounters:   03/03/20 344 lb (156 kg)   01/27/20 335 lb 3.2 oz (152 kg)   11/26/19 339 lb (153.8 kg)       BP Readings from Last 3 Encounters:   03/03/20 144/66   01/27/20 132/79   11/26/19 132/82           Past Medical History:   Diagnosis Date    Anxiety     Arthritis     [facility]     DM (diabetes mellitus) [facility]     GERD (gastroesophageal reflux disease)     HTN (hypertension)     Hyperprolactinemia ([address]     Hypogonadotropic hypogonadism ([address]     TROY on CPAP     Pituitary macroadenoma ([facility] December 2014     Current Outpatient Medications   Medication Sig    emtricitabine-tenofovir, TDF, (TRUVADA) 200-300 mg per tablet Take 1 Tab by mouth daily.  DULoxetine (CYMBALTA) 30 mg capsule Take 1 Cap by mouth daily.  fluticasone propionate (FLONASE) 50 mcg/actuation nasal spray 2 Sprays by Both Nostrils route daily.  dulaglutide (TRULICITY) 1.5 WV/1.8 mL sub-q pen 0.5 mL by SubCUTAneous route every seven (7) days. Stop 0.75 mg    tadalafil (CIALIS) 20 mg tablet Take 1 Tab by mouth as needed (every 5 days). Every 5 days    cabergoline (DOSTINEX) 0.5 mg tablet Take 0.5 Tabs by mouth two (2) times a week.  metFORMIN ER (GLUCOPHAGE XR) 500 mg tablet Take 2 Tabs by mouth two (2) times a day.     lisinopril-hydroCHLOROthiazide (PRINZIDE) 20-12.5 mg per tablet 1 tab in AM    pravastatin (PRAVACHOL) 20 mg tablet Take 1 Tab by mouth nightly.  testosterone cypionate (DEPOTESTOTERONE CYPIONATE) 200 mg/mL injection INJECT 1.25 ML INTO THE MUSCLE EVERY 2 WEEKS    glucose blood VI test strips (ONETOUCH VERIO) strip Test TID Dx Code: E11.65    insulin glargine (BASAGLAR KWIKPEN U-100 INSULIN) 100 unit/mL (3 mL) inpn 75 units twice a day    Insulin Needles, Disposable, 31 gauge x 5/16\" ndle Use to inject insulin BID. Dx code E11.65    Blood-Glucose Meter (ONETOUCH VERIO FLEX) misc Test TID Dx Code: E11.65    Syringe, Disposable, 3 mL syrg Once Q 2 weeks    Needle, Disp, 22 G 22 gauge x 1 1/2\" ndle Q 2 weeks    lancets (ONE TOUCH DELICA) 33 gauge misc Test TID Dx Code: E11.65    Insulin Needles, Disposable, 31 gauge x 5/16\" ndle Use to inject insulin 5x daily Dx Code: E11.65     No current facility-administered medications for this visit. Allergies   Allergen Reactions    Amoxicillin Hives    Erythromycin Other (comments)     Stomach cramps    Penicillins Hives    Sulfa (Sulfonamide Antibiotics) Hives         Review of Systems:  - Constitutional Symptoms: no fevers, no chills  - Eyes: no blurry vision no double vision  - Cardiovascular: no chest pain ,no palpitations  - Respiratory: no cough no shortness of breath  - Gastrointestinal: no dysphagia no  abdominal pain  - Musculoskeletal: no joint pains ,  weakness  - Integumentary: no rashes  - Neurological: no numbness, tingling, no  headaches  -     Physical Examination:   Blood pressure 144/66, pulse 84, temperature 99.8 °F (37.7 °C), temperature source Oral, resp. rate 26, height 5' 9\" (1.753 m), weight 344 lb (156 kg), SpO2 96 %. Estimated body mass index is 50.8 kg/m² as calculated from the following:    Height as of this encounter: 5' 9\" (1.753 m). -   Weight as of this encounter: 344 lb (156 kg).   - General: pleasant, no distress, good eye contact  - HEENT: no pallor, no periorbital edema, EOMI  - Neck: supple, no thyromegaly, thick neck  - Cardiovascular: regular, normal rate, normal S1 and S2  - Respiratory: clear to auscultation bilaterally  - Gastrointestinal: soft, nontender, nondistended,  BS +  - Musculoskeletal:,alert and oriented  - Psychiatric: normal mood and affect  - Skin: color, texture, turgor normal.     Diabetic foot exam: July 2019    Left:     Vibratory sensation decreased   Filament test normal sensation with micro filament   Pulse DP: 1+    Deformities: None  Right:    Vibratory sensation decreased   Filament test normal sensation with micro filament   Pulse DP: 1+   Deformities: None      Data Reviewed:       Lab Results   Component Value Date/Time    WBC 9.3 07/23/2019 02:06 PM    HGB 14.0 07/23/2019 02:06 PM    HCT 41.9 07/23/2019 02:06 PM    PLATELET 115 59/60/0436 02:06 PM    MCV 84 07/23/2019 02:06 PM     Lab Results   Component Value Date/Time    Hemoglobin A1c 7.0 (H) 02/25/2020 03:39 PM    Hemoglobin A1c 11.2 (H) 02/13/2018 02:00 PM    Hemoglobin A1c 10.7 (H) 11/18/2014 09:57 AM    Microalbumin/Creat ratio (mg/g creat) 25 02/25/2020 03:39 PM    Microalbumin,urine random 4.96 02/25/2020 03:39 PM    LDL,Direct 116 (H) 02/25/2020 03:39 PM    LDL, calculated 110 (H) 02/13/2018 02:00 PM    Creatinine 0.92 02/25/2020 03:39 PM      Lab Results   Component Value Date/Time    Cholesterol, total 169 02/13/2018 02:00 PM    HDL Cholesterol 29 (L) 02/13/2018 02:00 PM    LDL,Direct 116 (H) 02/25/2020 03:39 PM    LDL, calculated 110 (H) 02/13/2018 02:00 PM    Triglyceride 149 02/13/2018 02:00 PM     Lab Results   Component Value Date/Time    AST (SGOT) 21 02/25/2020 03:39 PM    Alk.  phosphatase 59 02/25/2020 03:39 PM     Lab Results   Component Value Date/Time    GFR est AA >60 02/25/2020 03:39 PM    GFR est non-AA >60 02/25/2020 03:39 PM    Creatinine 0.92 02/25/2020 03:39 PM    BUN 14 02/25/2020 03:39 PM    Sodium 134 (L) 02/25/2020 03:39 PM    Potassium 4.6 02/25/2020 03:39 PM Chloride 102 02/25/2020 03:39 PM    CO2 28 02/25/2020 03:39 PM      Lab Results   Component Value Date/Time    Prostate Specific Ag 0.6 07/23/2019 02:06 PM    Prostate Specific Ag 0.2 02/19/2018 03:02 PM    Prostate Specific Ag 0.9 11/18/2014 09:57 AM     Lab Results   Component Value Date/Time    TSH 1.800 08/08/2013 11:35 AM    T4, Free 1.38 01/02/2014 08:42 AM      Component      Latest Ref Rng 8/8/2013   TESTOSTERONE, TOTAL, LC/MS      348.0 - 1197.0 ng/dL 15.4 (L)   TESTOSTERONE, FREE      5.00 - 21.00 ng/dL 0.67 (L)   % Free testosterone      1.50 - 4.20 % 4.36 (H)   Luteinizing hormone      1.7 - 8.6 mIU/mL 0.5 (L)   FSH      1.5 - 12.4 mIU/mL 0.8 (L)   Prolactin      4.0 - 15.2 ng/mL 130.7 (H)      Lab Results   Component Value Date/Time    Testosterone 206 (L) 07/10/2014 03:42 PM     Lab Results   Component Value Date/Time    FSH 0.8 (L) 08/08/2013 11:35 AM    Luteinizing hormone 0.5 (L) 08/08/2013 11:35 AM    Prolactin 5.0 02/25/2020 03:39 PM           Lab Results   Component Value Date/Time    Hemoglobin A1c 7.0 (H) 02/25/2020 03:39 PM    Hemoglobin A1c 11.2 (H) 02/13/2018 02:00 PM    Hemoglobin A1c 10.7 (H) 11/18/2014 09:57 AM    Microalbumin/Creat ratio (mg/g creat) 25 02/25/2020 03:39 PM    Microalbumin,urine random 4.96 02/25/2020 03:39 PM    LDL,Direct 116 (H) 02/25/2020 03:39 PM    LDL, calculated 110 (H) 02/13/2018 02:00 PM    Creatinine 0.92 02/25/2020 03:39 PM      MRI 2014: normal      Assessment/Plan:     1. Type 2 Diabetes Mellitus  Lab Results   Component Value Date/Time    Hemoglobin A1c 7.0 (H) 02/25/2020 03:39 PM    Hemoglobin A1c (POC) 6.7 10/31/2019 09:36 AM     Controlled  Metformin XR  Insulin glargine insulin 75 units BID  Trulicity  Was on farxiga   FLU annually ,Pneumovax ,aspirin daily,annual eye exam,microalbumin. 2. Hyperlipidemia: Low-cholesterol diet  Statin    3. HTN :prinzide    4. TROY- on CPAP    5.  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 Macroadenoma 1.6 cm -prolactin secreting  MRI pituitary in August 2014 -normal MRI  MRI 2016: Small microadenoma  MRI 2019: No adenoma    Cabergoline 0.5 pill weekly       Mitchel Allred MD      Patient verbalized understanding

## 2020-03-03 NOTE — LETTER
3/4/20 Patient: Carter Chavira YOB: 1975 Date of Visit: 3/3/2020 Екатерина Kimball NP 
[place] [id number] VIA In Basket Dear Екатерина Kimball NP, Thank you for referring Mr. Natali Tomas to [address] [place] [address] for evaluation. My notes for this consultation are attached. If you have questions, please do not hesitate to call me. I look forward to following your patient along with you. Sincerely, Steven Triplett MD

## 2020-03-11 ENCOUNTER — OFFICE VISIT (OUTPATIENT)
Dept: FAMILY MEDICINE CLINIC | Age: 45
End: 2020-03-11

## 2020-03-11 VITALS
WEIGHT: 315 LBS | BODY MASS INDEX: 46.65 KG/M2 | SYSTOLIC BLOOD PRESSURE: 155 MMHG | OXYGEN SATURATION: 94 % | RESPIRATION RATE: 18 BRPM | HEART RATE: 69 BPM | DIASTOLIC BLOOD PRESSURE: 91 MMHG | TEMPERATURE: 98.2 F | HEIGHT: 69 IN

## 2020-03-11 DIAGNOSIS — R05.9 COUGH: ICD-10-CM

## 2020-03-11 DIAGNOSIS — Z79.4 TYPE 2 DIABETES MELLITUS WITH RETINOPATHY, WITH LONG-TERM CURRENT USE OF INSULIN, MACULAR EDEMA PRESENCE UNSPECIFIED, UNSPECIFIED LATERALITY, UNSPECIFIED RETINOPATHY SEVERITY (HCC): ICD-10-CM

## 2020-03-11 DIAGNOSIS — R04.2 HEMOPTYSIS: ICD-10-CM

## 2020-03-11 DIAGNOSIS — F33.1 MODERATE EPISODE OF RECURRENT MAJOR DEPRESSIVE DISORDER (HCC): Primary | ICD-10-CM

## 2020-03-11 DIAGNOSIS — E11.319 TYPE 2 DIABETES MELLITUS WITH RETINOPATHY, WITH LONG-TERM CURRENT USE OF INSULIN, MACULAR EDEMA PRESENCE UNSPECIFIED, UNSPECIFIED LATERALITY, UNSPECIFIED RETINOPATHY SEVERITY (HCC): ICD-10-CM

## 2020-03-11 DIAGNOSIS — F41.1 GAD (GENERALIZED ANXIETY DISORDER): ICD-10-CM

## 2020-03-11 RX ORDER — BENZONATATE 200 MG/1
CAPSULE ORAL
COMMUNITY
Start: 2020-03-05 | End: 2020-03-11 | Stop reason: SDUPTHER

## 2020-03-11 RX ORDER — DULOXETIN HYDROCHLORIDE 60 MG/1
60 CAPSULE, DELAYED RELEASE ORAL DAILY
Qty: 30 CAP | Refills: 1 | Status: SHIPPED | OUTPATIENT
Start: 2020-03-11 | End: 2020-06-02

## 2020-03-11 RX ORDER — BENZONATATE 200 MG/1
200 CAPSULE ORAL
Qty: 30 CAP | Refills: 1 | Status: SHIPPED | OUTPATIENT
Start: 2020-03-11 | End: 2020-03-21

## 2020-03-11 NOTE — PROGRESS NOTES
Winsome Forbes is a 38 y.o. male    Chief Complaint   Patient presents with    Depression    Anxiety    Follow-up    Cough     Pt stated that he did Virtual visit with  for cough. 1. Have you been to the ER, urgent care clinic since your last visit? Hospitalized since your last visit? No    2. Have you seen or consulted any other health care providers outside of the [address] [place] [address] since your last visit? Include any pap smears or colon screening.  No

## 2020-03-11 NOTE — PATIENT INSTRUCTIONS
Cough: Care Instructions  Your Care Instructions    A cough is your body's response to something that bothers your throat or airways. Many things can cause a cough. You might cough because of a cold or the flu, bronchitis, or asthma. Smoking, postnasal drip, allergies, and stomach acid that backs up into your throat also can cause coughs. A cough is a symptom, not a disease. Most coughs stop when the cause, such as a cold, goes away. You can take a few steps at home to cough less and feel better. Follow-up care is a key part of your treatment and safety. Be sure to make and go to all appointments, and call your doctor if you are having problems. It's also a good idea to know your test results and keep a list of the medicines you take. How can you care for yourself at home? · Drink lots of water and other fluids. This helps thin the mucus and soothes a dry or sore throat. Honey or lemon juice in hot water or tea may ease a dry cough. · Take cough medicine as directed by your doctor. · Prop up your head on pillows to help you breathe and ease a dry cough. · Try cough drops to soothe a dry or sore throat. Cough drops don't stop a cough. Medicine-flavored cough drops are no better than candy-flavored drops or hard candy. · Do not smoke. Avoid secondhand smoke. If you need help quitting, talk to your doctor about stop-smoking programs and medicines. These can increase your chances of quitting for good. When should you call for help? Call 911 anytime you think you may need emergency care.  For example, call if:    · You have severe trouble breathing.    Call your doctor now or seek immediate medical care if:    · You cough up blood.     · You have new or worse trouble breathing.     · You have a new or higher fever.     · You have a new rash.    Watch closely for changes in your health, and be sure to contact your doctor if:    · You cough more deeply or more often, especially if you notice more mucus or a change in the color of your mucus.     · You have new symptoms, such as a sore throat, an earache, or sinus pain.     · You do not get better as expected. Where can you learn more? Go to http://shin-jacob.info/. Enter D279 in the search box to learn more about \"Cough: Care Instructions. \"  Current as of: June 9, 2019  Content Version: 12.2  © 8115-0995 [employer]. Care instructions adapted under license by [employer] (which disclaims liability or warranty for this information). If you have questions about a medical condition or this instruction, always ask your healthcare professional. [address] any warranty or liability for your use of this information. Recovering From Depression: Care Instructions  Your Care Instructions    Taking good care of yourself is important as you recover from depression. In time, your symptoms will fade as your treatment takes hold. Do not give up. Instead, focus your energy on getting better. Your mood will improve. It just takes some time. Focus on things that can help you feel better, such as being with friends and family, eating well, and getting enough rest. But take things slowly. Do not do too much too soon. You will begin to feel better gradually. Follow-up care is a key part of your treatment and safety. Be sure to make and go to all appointments, and call your doctor if you are having problems. It's also a good idea to know your test results and keep a list of the medicines you take. How can you care for yourself at home? Be realistic  · If you have a large task to do, break it up into smaller steps you can handle, and just do what you can. · You may want to put off important decisions until your depression has lifted. If you have plans that will have a major impact on your life, such as marriage, divorce, or a job change, try to wait a bit.  Talk it over with friends and loved ones who can help you look at the overall picture first.  · Reaching out to people for help is important. Do not isolate yourself. Let your family and friends help you. Find someone you can trust and confide in, and talk to that person. · Be patient, and be kind to yourself. Remember that depression is not your fault and is not something you can overcome with willpower alone. Treatment is necessary for depression, just like for any other illness. Feeling better takes time, and your mood will improve little by little. Stay active  · Stay busy and get outside. Take a walk, or try some other light exercise. · Talk with your doctor about an exercise program. Exercise can help with mild depression. · Go to a movie or concert. Take part in a Mandaeism activity or other social gathering. Go to a [employer] Mobile game. · Ask a friend to have dinner with you. Take care of yourself  · Eat a balanced diet with plenty of fresh fruits and vegetables, whole grains, and lean protein. If you have lost your appetite, eat small snacks rather than large meals. · Avoid drinking alcohol or using illegal drugs. Do not take medicines that have not been prescribed for you. They may interfere with medicines you may be taking for depression, or they may make your depression worse. · Take your medicines exactly as they are prescribed. You may start to feel better within 1 to 3 weeks of taking antidepressant medicine. But it can take as many as 6 to 8 weeks to see more improvement. If you have questions or concerns about your medicines, or if you do not notice any improvement by 3 weeks, talk to your doctor. · If you have any side effects from your medicine, tell your doctor. Antidepressants can make you feel tired, dizzy, or nervous. Some people have dry mouth, constipation, headaches, sexual problems, or diarrhea. Many of these side effects are mild and will go away on their own after you have been taking the medicine for a few weeks. Some may last longer. Talk to your doctor if side effects are bothering you too much. You might be able to try a different medicine. · Get enough sleep. If you have problems sleeping:  ? Go to bed at the same time every night, and get up at the same time every morning. ? Keep your bedroom dark and quiet. ? Do not exercise after 5:00 p.m.  ? Avoid drinks with caffeine after 5:00 p.m. · Avoid sleeping pills unless they are prescribed by the doctor treating your depression. Sleeping pills may make you groggy during the day, and they may interact with other medicine you are taking. · If you have any other illnesses, such as diabetes, heart disease, or high blood pressure, make sure to continue with your treatment. Tell your doctor about all of the medicines you take, including those with or without a prescription. · Keep the numbers for these national suicide hotlines: 2-241-090-TALK (7-105.850.1514) and 9-584-CCLVEDU (3-477.363.2182). If you or someone you know talks about suicide or feeling hopeless, get help right away. When should you call for help? Call 911 anytime you think you may need emergency care. For example, call if:    · You feel like hurting yourself or someone else.     · Someone you know has depression and is about to attempt or is attempting suicide.   [facility] your doctor now or seek immediate medical care if:    · You hear voices.     · Someone you know has depression and:  ? Starts to give away his or her possessions. ? Uses illegal drugs or drinks alcohol heavily. ? Talks or writes about death, including writing suicide notes or talking about guns, knives, or pills. ? Starts to spend a lot of time alone. ? Acts very aggressively or suddenly appears calm.    Watch closely for changes in your health, and be sure to contact your doctor if:    · You do not get better as expected. Where can you learn more? Go to http://shin-jacob.info/.   Enter N464 in the search box to learn more about \"Recovering From Depression: Care Instructions. \"  Current as of: May 28, 2019  Content Version: 12.2  © 2141-7924 [employer]. Care instructions adapted under license by [employer] (which disclaims liability or warranty for this information). If you have questions about a medical condition or this instruction, always ask your healthcare professional. [address] any warranty or liability for your use of this information.

## 2020-03-11 NOTE — PROGRESS NOTES
PPD Placement note  Kathya Gonzalez, 38 y.o. male is here today for placement of PPD test  Reason for PPD test: Work  Pt taken PPD test before: yes  Verified in allergy area and with patient that they are not allergic to the products PPD is made of (Phenol or Tween). No:   Is patient taking any oral or IV steroid medication now or have they taken it in the last month? no  Has the patient ever received the BCG vaccine?: no  Has the patient been in recent contact with anyone known or suspected of having active TB disease?: no       Date of exposure (if applicable): N/A       Name of person they were exposed to (if applicable): N/A  Patient's Country of origin?: N/A   O: Alert and oriented in NAD. P:  PPD placed on 3/11/2020. Patient advised to return for reading within 48-72 hours.

## 2020-03-13 LAB
MM INDURATION POC: 0 MM (ref 0–5)
PPD POC: NEGATIVE NEGATIVE

## 2020-03-16 DIAGNOSIS — R04.2 HEMOPTYSIS: ICD-10-CM

## 2020-03-16 DIAGNOSIS — R05.9 COUGH: ICD-10-CM

## 2020-03-21 NOTE — PROGRESS NOTES
Jeramie Erazo is a 38 y.o. male who presents with the following complaints:  Chief Complaint   Patient presents with    Depression    Anxiety    Follow-up    Cough     Pt stated that he did Virtual visit with  for cough. Subjective:    HPI:   Presents for f/u of anxiety/depression and evaluation of cough. C/o  5-6 day hx of productive cough with reddish brown sputum, some sputum bloody in appearance, fevers of up to 102, no chills. No significant nasal congestion or ear pressure. + headache. Had a virtual visit over the weekend and was prescribe antibx (?doxycycline). Feeling better since starting antibiotic, fever resolved but continues to note productive cough. He is also requesting ppd for work. Reports blood sugars have been controlled during recent illness. Reports good compliance with medications, compliant most of the time with diet recommendations. Weight down 6 lbs. Last endocrine follow up earlier this month. Reports anxiety and depression symptoms have improved on cymbalta, but are yet controlled. Has tolerated medication well, reports good compliance and no apparent side effects. Has noted not ill effects associated with discontinuing bupropion.        Pertinent PMH/FH/SH:  Past Medical History:   Diagnosis Date    Anxiety     Arthritis     Diabetic neuropathy [facility] [address]     DM (diabetes mellitus) ([facility] [address]     GERD (gastroesophageal reflux disease)     HTN (hypertension)     Hyperprolactinemia (HCC)     Hypogonadotropic hypogonadism (HCC)     TROY on CPAP     Pituitary macroadenoma ([facility] [address] December 2014     Past Surgical History:   Procedure Laterality Date    HX COLONOSCOPY  7/14    HX WISDOM TEETH EXTRACTION       Family History   Problem Relation Age of Onset    Hypertension Mother     Diabetes Father     Hypertension Brother     Diabetes Maternal Grandfather     Diabetes Paternal Grandfather      Social History     Socioeconomic History    Marital status: SINGLE Spouse name: Not on file    Number of children: Not on file    Years of education: Not on file    Highest education level: Not on file   Tobacco Use    Smoking status: Never Smoker    Smokeless tobacco: Never Used   Substance and Sexual Activity    Alcohol use:  Yes    Drug use: No    Sexual activity: Yes     Advanced Directives: N      Patient Active Problem List    Diagnosis    Moderate episode of recurrent major depressive disorder ([facility] [address]    SHIMON (generalized anxiety disorder)    Obesity, morbid ([facility] [address]    Pituitary macroadenoma (HCC)    Other and unspecified anterior pituitary hyperfunction    Hyperprolactinemia (HCC)    Hyperlipidemia    Type 2 diabetes mellitus with retinopathy, without long-term current use of insulin (HCC)    HTN (hypertension)    TROY on CPAP    Hypogonadism male       Nurse notes were reviewed and are correct  Review of Systems - negative except as listed above in the HPI    Objective:     Vitals:    03/11/20 1537   BP: (!) 155/91   Pulse: 69   Resp: 18   Temp: 98.2 °F (36.8 °C)   TempSrc: Oral   SpO2: 94%   Weight: 338 lb 3.2 oz (153.4 kg)   Height: 5' 9\" (1.753 m)     Physical Examination: General appearance - alert, well appearing, and in no distress, oriented to person, place, and time and well hydrated  Mental status - normal mood, behavior, speech, dress, motor activity, and thought processes  Eyes - sclera anicteric  Neck - supple, no significant adenopathy, thyroid exam: thyroid is normal in size without nodules or tenderness  Chest - clear to auscultation, no wheezes, rales or rhonchi, symmetric air entry  Heart - normal rate, regular rhythm, normal S1, S2, no murmurs, rubs, clicks or gallops  Abdomen - soft, nontender, nondistended, no masses or organomegaly  bowel sounds normal  Neurological - alert, oriented, normal speech, no focal findings or movement disorder noted  Extremities - no pedal edema noted  Skin - normal coloration and turgor, no rashes, no suspicious skin lesions noted    Assessment/ Plan:   Diagnoses and all orders for this visit:    1. Moderate episode of recurrent major depressive disorder ([address]  2. SHIMON (generalized anxiety disorder)  Moderate improvement  Increase duloxetine to 60 mg daily  -     DULoxetine (CYMBALTA) 60 mg capsule; Take 1 Cap by mouth daily. 3. Type 2 diabetes mellitus with retinopathy, with long-term current use of insulin, macular edema presence unspecified, unspecified laterality, unspecified retinopathy severity ([address]    4. Cough  Acute bronchitis  Complete doxycycline as prescribed  CXR  mucinex BID x 5 days  -     XR CHEST PA LAT; Future    5. Hemoptysis  resolved  -     AMB POC TUBERCULOSIS, INTRADERMAL (SKIN TEST)  -     XR CHEST PA LAT; Future    Other orders  -     benzonatate (TESSALON) 200 mg capsule; Take 1 Cap by mouth three (3) times daily as needed for Cough for up to 10 days. Follow-up and Dispositions    · Return in about 2 days (around 3/13/2020) for ppd reading. I have discussed the diagnosis with the patient and the intended plan as seen in the above orders. The patient has received an after-visit summary and questions were answered concerning future plans. The patient verbalizes understanding. Medication Side Effects and Warnings were discussed with patient: yes  Patient Labs were reviewed and or requested: yes  Patient Past Records were reviewed and or requested: yes    Patient Instructions          Cough: Care Instructions  Your Care Instructions    A cough is your body's response to something that bothers your throat or airways. Many things can cause a cough. You might cough because of a cold or the flu, bronchitis, or asthma. Smoking, postnasal drip, allergies, and stomach acid that backs up into your throat also can cause coughs. A cough is a symptom, not a disease. Most coughs stop when the cause, such as a cold, goes away.  You can take a few steps at home to cough less and feel better. Follow-up care is a key part of your treatment and safety. Be sure to make and go to all appointments, and call your doctor if you are having problems. It's also a good idea to know your test results and keep a list of the medicines you take. How can you care for yourself at home? · Drink lots of water and other fluids. This helps thin the mucus and soothes a dry or sore throat. Honey or lemon juice in hot water or tea may ease a dry cough. · Take cough medicine as directed by your doctor. · Prop up your head on pillows to help you breathe and ease a dry cough. · Try cough drops to soothe a dry or sore throat. Cough drops don't stop a cough. Medicine-flavored cough drops are no better than candy-flavored drops or hard candy. · Do not smoke. Avoid secondhand smoke. If you need help quitting, talk to your doctor about stop-smoking programs and medicines. These can increase your chances of quitting for good. When should you call for help? Call 911 anytime you think you may need emergency care. For example, call if:    · You have severe trouble breathing.    Call your doctor now or seek immediate medical care if:    · You cough up blood.     · You have new or worse trouble breathing.     · You have a new or higher fever.     · You have a new rash.    Watch closely for changes in your health, and be sure to contact your doctor if:    · You cough more deeply or more often, especially if you notice more mucus or a change in the color of your mucus.     · You have new symptoms, such as a sore throat, an earache, or sinus pain.     · You do not get better as expected. Where can you learn more? Go to http://shin-jacob.info/. Enter D279 in the search box to learn more about \"Cough: Care Instructions. \"  Current as of: June 9, 2019  Content Version: 12.2  © 9272-8942 [employer].  Care instructions adapted under license by [employer] (which disclaims liability or warranty for this information). If you have questions about a medical condition or this instruction, always ask your healthcare professional. [address] any warranty or liability for your use of this information. Recovering From Depression: Care Instructions  Your Care Instructions    Taking good care of yourself is important as you recover from depression. In time, your symptoms will fade as your treatment takes hold. Do not give up. Instead, focus your energy on getting better. Your mood will improve. It just takes some time. Focus on things that can help you feel better, such as being with friends and family, eating well, and getting enough rest. But take things slowly. Do not do too much too soon. You will begin to feel better gradually. Follow-up care is a key part of your treatment and safety. Be sure to make and go to all appointments, and call your doctor if you are having problems. It's also a good idea to know your test results and keep a list of the medicines you take. How can you care for yourself at home? Be realistic  · If you have a large task to do, break it up into smaller steps you can handle, and just do what you can. · You may want to put off important decisions until your depression has lifted. If you have plans that will have a major impact on your life, such as marriage, divorce, or a job change, try to wait a bit. Talk it over with friends and loved ones who can help you look at the overall picture first.  · Reaching out to people for help is important. Do not isolate yourself. Let your family and friends help you. Find someone you can trust and confide in, and talk to that person. · Be patient, and be kind to yourself. Remember that depression is not your fault and is not something you can overcome with willpower alone. Treatment is necessary for depression, just like for any other illness.  Feeling better takes time, and your mood will improve little by little. Stay active  · Stay busy and get outside. Take a walk, or try some other light exercise. · Talk with your doctor about an exercise program. Exercise can help with mild depression. · Go to a movie or concert. Take part in a Shinto activity or other social gathering. Go to a ball game. · Ask a friend to have dinner with you. Take care of yourself  · Eat a balanced diet with plenty of fresh fruits and vegetables, whole grains, and lean protein. If you have lost your appetite, eat small snacks rather than large meals. · Avoid drinking alcohol or using illegal drugs. Do not take medicines that have not been prescribed for you. They may interfere with medicines you may be taking for depression, or they may make your depression worse. · Take your medicines exactly as they are prescribed. You may start to feel better within 1 to 3 weeks of taking antidepressant medicine. But it can take as many as 6 to 8 weeks to see more improvement. If you have questions or concerns about your medicines, or if you do not notice any improvement by 3 weeks, talk to your doctor. · If you have any side effects from your medicine, tell your doctor. Antidepressants can make you feel tired, dizzy, or nervous. Some people have dry mouth, constipation, headaches, sexual problems, or diarrhea. Many of these side effects are mild and will go away on their own after you have been taking the medicine for a few weeks. Some may last longer. Talk to your doctor if side effects are bothering you too much. You might be able to try a different medicine. · Get enough sleep. If you have problems sleeping:  ? Go to bed at the same time every night, and get up at the same time every morning. ? Keep your bedroom dark and quiet. ? Do not exercise after 5:00 p.m.  ? Avoid drinks with caffeine after 5:00 p.m. · Avoid sleeping pills unless they are prescribed by the doctor treating your depression.  Sleeping pills may make you groggy during the day, and they may interact with other medicine you are taking. · If you have any other illnesses, such as diabetes, heart disease, or high blood pressure, make sure to continue with your treatment. Tell your doctor about all of the medicines you take, including those with or without a prescription. · Keep the numbers for these national suicide hotlines: 6-114-355-TALK (0-181.619.8996) and 7-668-HSSIKNW (1-247.885.6570). If you or someone you know talks about suicide or feeling hopeless, get help right away. When should you call for help? Call 911 anytime you think you may need emergency care. For example, call if:    · You feel like hurting yourself or someone else.     · Someone you know has depression and is about to attempt or is attempting suicide.   [facility] your doctor now or seek immediate medical care if:    · You hear voices.     · Someone you know has depression and:  ? Starts to give away his or her possessions. ? Uses illegal drugs or drinks alcohol heavily. ? Talks or writes about death, including writing suicide notes or talking about guns, knives, or pills. ? Starts to spend a lot of time alone. ? Acts very aggressively or suddenly appears calm.    Watch closely for changes in your health, and be sure to contact your doctor if:    · You do not get better as expected. Where can you learn more? Go to http://shin-jacob.info/. Enter B107 in the search box to learn more about \"Recovering From Depression: Care Instructions. \"  Current as of: May 28, 2019  Content Version: 12.2  © 9083-4559 [employer]. Care instructions adapted under license by [employer] (which disclaims liability or warranty for this information). If you have questions about a medical condition or this instruction, always ask your healthcare professional. [address] any warranty or liability for your use of this information.             Filomena EID

## 2020-04-21 DIAGNOSIS — E29.1 HYPOGONADISM MALE: ICD-10-CM

## 2020-04-21 DIAGNOSIS — D35.2 PITUITARY MACROADENOMA (HCC): ICD-10-CM

## 2020-04-21 DIAGNOSIS — E11.65 TYPE 2 DIABETES MELLITUS WITH HYPERGLYCEMIA, WITH LONG-TERM CURRENT USE OF INSULIN (HCC): ICD-10-CM

## 2020-04-21 DIAGNOSIS — Z79.4 TYPE 2 DIABETES MELLITUS WITH HYPERGLYCEMIA, WITH LONG-TERM CURRENT USE OF INSULIN (HCC): ICD-10-CM

## 2020-04-21 DIAGNOSIS — G47.33 OSA ON CPAP: ICD-10-CM

## 2020-04-21 DIAGNOSIS — Z99.89 OSA ON CPAP: ICD-10-CM

## 2020-04-21 DIAGNOSIS — E22.1 HYPERPROLACTINEMIA (HCC): ICD-10-CM

## 2020-04-21 RX ORDER — SYRINGE, DISPOSABLE, 3 ML
SYRINGE, EMPTY DISPOSABLE MISCELLANEOUS
Qty: 10 SYRINGE | Refills: 3 | Status: SHIPPED | OUTPATIENT
Start: 2020-04-21 | End: 2021-10-07

## 2020-04-21 RX ORDER — DULAGLUTIDE 1.5 MG/.5ML
1.5 INJECTION, SOLUTION SUBCUTANEOUS
Qty: 12 SYRINGE | Refills: 3 | Status: SHIPPED | OUTPATIENT
Start: 2020-04-21 | End: 2020-07-21

## 2020-04-21 RX ORDER — TESTOSTERONE CYPIONATE 200 MG/ML
INJECTION INTRAMUSCULAR
Qty: 6 ML | Refills: 0 | Status: SHIPPED | OUTPATIENT
Start: 2020-04-21 | End: 2020-06-02

## 2020-04-21 RX ORDER — BLOOD-GLUCOSE METER
EACH MISCELLANEOUS
Qty: 1 EACH | Refills: 0 | Status: SHIPPED | OUTPATIENT
Start: 2020-04-21 | End: 2021-06-19

## 2020-04-21 RX ORDER — LISINOPRIL AND HYDROCHLOROTHIAZIDE 12.5; 2 MG/1; MG/1
TABLET ORAL
Qty: 90 TAB | Refills: 3 | Status: SHIPPED | OUTPATIENT
Start: 2020-04-21 | End: 2020-11-05 | Stop reason: ALTCHOICE

## 2020-04-21 RX ORDER — INSULIN GLARGINE 100 [IU]/ML
INJECTION, SOLUTION SUBCUTANEOUS
Qty: 45 ML | Refills: 5 | Status: SHIPPED | OUTPATIENT
Start: 2020-04-21 | End: 2020-07-21 | Stop reason: ALTCHOICE

## 2020-04-21 RX ORDER — METFORMIN HYDROCHLORIDE 500 MG/1
1000 TABLET, EXTENDED RELEASE ORAL 2 TIMES DAILY
Qty: 360 TAB | Refills: 5 | Status: SHIPPED | OUTPATIENT
Start: 2020-04-21 | End: 2021-06-19

## 2020-06-02 DIAGNOSIS — E22.1 HYPERPROLACTINEMIA (HCC): ICD-10-CM

## 2020-06-02 DIAGNOSIS — D35.2 PITUITARY MACROADENOMA (HCC): ICD-10-CM

## 2020-06-02 DIAGNOSIS — F33.1 MODERATE EPISODE OF RECURRENT MAJOR DEPRESSIVE DISORDER (HCC): ICD-10-CM

## 2020-06-02 DIAGNOSIS — F41.1 GAD (GENERALIZED ANXIETY DISORDER): ICD-10-CM

## 2020-06-02 DIAGNOSIS — Z79.899 ON PRE-EXPOSURE PROPHYLAXIS FOR HIV: ICD-10-CM

## 2020-06-02 DIAGNOSIS — E29.1 HYPOGONADISM MALE: ICD-10-CM

## 2020-06-02 DIAGNOSIS — Z99.89 OSA ON CPAP: ICD-10-CM

## 2020-06-02 DIAGNOSIS — Z79.4 TYPE 2 DIABETES MELLITUS WITH HYPERGLYCEMIA, WITH LONG-TERM CURRENT USE OF INSULIN (HCC): ICD-10-CM

## 2020-06-02 DIAGNOSIS — G47.33 OSA ON CPAP: ICD-10-CM

## 2020-06-02 DIAGNOSIS — E11.65 TYPE 2 DIABETES MELLITUS WITH HYPERGLYCEMIA, WITH LONG-TERM CURRENT USE OF INSULIN (HCC): ICD-10-CM

## 2020-06-02 DIAGNOSIS — Z72.51 HIGH RISK SEXUAL BEHAVIOR, UNSPECIFIED TYPE: ICD-10-CM

## 2020-06-02 RX ORDER — EMTRICITABINE AND TENOFOVIR DISOPROXIL FUMARATE 200; 300 MG/1; MG/1
TABLET, FILM COATED ORAL
Qty: 30 TAB | Refills: 2 | OUTPATIENT
Start: 2020-06-02

## 2020-06-02 RX ORDER — DULOXETIN HYDROCHLORIDE 60 MG/1
CAPSULE, DELAYED RELEASE ORAL
Qty: 30 CAP | Refills: 1 | Status: SHIPPED | OUTPATIENT
Start: 2020-06-02 | End: 2020-10-05

## 2020-06-02 RX ORDER — TESTOSTERONE CYPIONATE 200 MG/ML
INJECTION INTRAMUSCULAR
Qty: 6 ML | Refills: 0 | Status: SHIPPED | OUTPATIENT
Start: 2020-06-02 | End: 2020-07-22

## 2020-06-03 ENCOUNTER — VIRTUAL VISIT (OUTPATIENT)
Dept: FAMILY MEDICINE CLINIC | Age: 45
End: 2020-06-03

## 2020-06-03 DIAGNOSIS — Z79.899 ON PRE-EXPOSURE PROPHYLAXIS FOR HIV: ICD-10-CM

## 2020-06-03 DIAGNOSIS — Z72.51 HIGH RISK SEXUAL BEHAVIOR, UNSPECIFIED TYPE: Primary | ICD-10-CM

## 2020-06-03 NOTE — PATIENT INSTRUCTIONS
Learning About Taking Medicine to Prevent HIV Infections Introduction HIV (human immunodeficiency virus) is a virus that attacks the immune system, the body's natural defense system. Without a strong immune system, the body has trouble fighting off disease. HIV is the virus that causes AIDS. Two common ways to get HIV are: 
· Having unprotected sex with someone who has HIV. · Sharing needles with someone who has HIV. These things put you at high risk for getting HIV. If you are at risk, you and your doctor can decide if you can take medicines that may lower your risk. Taking these medicines is called pre-exposure prophylaxis (PrEP). How does PrEP work? PrEP can help prevent an HIV infection from taking hold and spreading in your body. Two medicines are combined in one pill called Truvada. You must take it on schedule for it to help protect you from HIV. PrEP works best if you take the medicine every day. It doesn't work well if you don't follow the daily schedule. Do not share your medicine with other people. You will have regular visits with your doctor. He or she will check to see how you are doing while taking the medicine. You'll be tested for HIV. Your doctor may also talk to you about other steps you can take to avoid HIV infection. These include practicing safer sex and not injecting illegal drugs with shared needles. What else should you know about PrEP? PrEP does not remove all risk of getting HIV. While you take the medicine, avoid risky actions like having unprotected sex and sharing needles. PrEP can help you have a baby safely when your partner has an HIV infection. It can help prevent the infection from spreading to you or your baby. Your doctor can discuss this and other options with you. If you are infected with HIV, your doctor may give you Truvada along with other medicine to treat HIV. Be safe with medicines. Take your medicines exactly as prescribed.  Call your doctor if you think you are having a problem with your medicine. You may be able to pay less for PrEP medicines. Many health insurance plans cover the cost of PrEP. There are programs that provide PrEP for free or at a lower cost for people who need help paying for it. Follow-up care is a key part of your treatment and safety. Be sure to make and go to all appointments, and call your doctor if you are having problems. It's also a good idea to know your test results and keep a list of the medicines you take. Where can you learn more? Go to http://shin-jacob.info/ Enter Y083 in the search box to learn more about \"Learning About Taking Medicine to Prevent HIV Infections. \" Current as of: February 11, 2020               Content Version: 12.5 © 4809-9913 [employer]. Care instructions adapted under license by "[employer] (which disclaims liability or warranty for this information). If you have questions about a medical condition or this instruction, always ask your healthcare professional. [address] any warranty or liability for your use of this information.

## 2020-06-03 NOTE — PROGRESS NOTES
Balaji Cisneros is a 38 y.o. male who was seen by synchronous (real-time) audio-video technology on 6/3/2020. Consent: Balaji Cisneros, who was seen by synchronous (real-time) audio-video technology, and/or his healthcare decision maker, is aware that this patient-initiated, Telehealth encounter on 6/3/2020 is a billable service, with coverage as determined by his insurance carrier. He is aware that he may receive a bill and has provided verbal consent to proceed: Yes. Assessment & Plan:   Diagnoses and all orders for this visit:    1. High risk sexual behavior, unspecified type  2. On pre-exposure prophylaxis for HIV  HIV screening, STD screening, BMP  If screenings negative and Cr Cl remains > 60, renew Truvada Rx x 90 days  -     Deanne Calzada / Shannon Morillo; Future  -     HIV 1/2 AG/AB, 4TH GENERATION,W RFLX CONFIRM; Future  -     METABOLIC PANEL, BASIC; Future        Follow-up and Dispositions    · Return in about 3 months (around 9/3/2020), or if symptoms worsen or fail to improve, for medication monitoring. 712  Subjective:   Balaji Cisneros is a 38 y.o. male who was seen for Medication Refill    HPI:    Presents for follow up of high risk sexual behavior, long term medication use (HIV PrEP). Reports he remains at risk for exposure to HIV due to high risk sexual behavior. Reports good daily compliance with Truvada, tolerating well without apparent side effects. No known exposure to HIV or other STI. No penile discharge, no pyuria. Would like to continue pre-exposure prophylaxis. Prior to Admission medications    Medication Sig Start Date End Date Taking?  Authorizing Provider   DULoxetine (CYMBALTA) 60 mg capsule TAKE ONE CAPSULE BY MOUTH ONE TIME DAILY 6/2/20  Yes Sherman Maloney Q, NP   testosterone cypionate (DEPOTESTOTERONE CYPIONATE) 200 mg/mL injection INJECT 1.25ML INTO MUSCLE ONCE EVERY 2 WEEKS 6/2/20  Yes Jagjit Calvo MD   Blood-Glucose Meter (OneTouch Verio Flex) misc Test TID Dx Code: E11.65 4/21/20  Yes Michael Lyons MD   Syringe, Disposable, 3 mL syrg Once Q 2 weeks 4/21/20  Yes Michael Lyons MD   Needle, Disp, 22 G 22 gauge x 1 1/2\" ndle Q 2 weeks 4/21/20  Yes Michael Lyons MD   glucose blood VI test strips (OneTouch Verio) strip Test TID Dx Code: E11.65 4/21/20  Yes Michael Lyons MD   insulin glargine (Basaglar KwikPen U-100 Insulin) 100 unit/mL (3 mL) inpn 75 units twice a day 4/21/20  Yes Michael Lyons MD   metFORMIN ER (GLUCOPHAGE XR) 500 mg tablet Take 2 Tabs by mouth two (2) times a day. 4/21/20  Yes Michael Lyons MD   lisinopril-hydroCHLOROthiazide (Prinzide) 20-12.5 mg per tablet 1 tab in AM 4/21/20  Yes Michael Lyons MD   dulaglutide (Trulicity) 1.5 DC/5.2 mL sub-q pen 0.5 mL by SubCUTAneous route every seven (7) days. Stop 0.75 mg 4/21/20  Yes Michael Lyons MD   emtricitabine-tenofovir, TDF, (TRUVADA) 200-300 mg per tablet Take 1 Tab by mouth daily. 2/5/20  Yes Sherman Maloney, NP   fluticasone propionate (FLONASE) 50 mcg/actuation nasal spray 2 Sprays by Both Nostrils route daily. 11/26/19  Yes Sherman Maloney, NP   tadalafil (CIALIS) 20 mg tablet Take 1 Tab by mouth as needed (every 5 days). Every 5 days 9/5/19  Yes Michael Lyons MD   cabergoline (DOSTINEX) 0.5 mg tablet Take 0.5 Tabs by mouth two (2) times a week. 7/26/19  Yes Michael Lyons MD   pravastatin (PRAVACHOL) 20 mg tablet Take 1 Tab by mouth nightly. 7/23/19  Yes Michael Lyons MD   Insulin Needles, Disposable, 31 gauge x 5/16\" ndle Use to inject insulin BID.  Dx code E11.65 7/23/19  Yes Michael Lyons MD   lancets (ONE TOUCH DELICA) 33 gauge misc Test TID Dx Code: E11.65 2/19/18  Yes Michael Lyons MD   Insulin Needles, Disposable, 31 gauge x 5/16\" ndle Use to inject insulin 5x daily Dx Code: E11.65 2/19/18  Yes Michael Lyons MD     Allergies   Allergen Reactions    Amoxicillin Hives    Erythromycin Other (comments) Stomach cramps    Penicillins Hives    Sulfa (Sulfonamide Antibiotics) Hives       Patient Active Problem List   Diagnosis Code    Type 2 diabetes mellitus with retinopathy, without long-term current use of insulin ([facility]) E11.319    HTN (hypertension) I10    TROY on CPAP G47.33, Z99.89    Hypogonadism male E29.1    Hyperprolactinemia (HCC) E22.1    Hyperlipidemia E78.5    Other and unspecified anterior pituitary hyperfunction E22.9    Pituitary macroadenoma ([facility]) D35.2    Obesity, morbid ([facility]) E66.01    Moderate episode of recurrent major depressive disorder ([facility]) F33.1    SHIMON (generalized anxiety disorder) F41.1     Allergies   Allergen Reactions    Amoxicillin Hives    Erythromycin Other (comments)     Stomach cramps    Penicillins Hives    Sulfa (Sulfonamide Antibiotics) Hives     Past Medical History:   Diagnosis Date    Anxiety     Arthritis     Diabetic neuropathy ([address]     DM (diabetes mellitus) (HCC)     GERD (gastroesophageal reflux disease)     HTN (hypertension)     Hyperprolactinemia (HCC)     Hypogonadotropic hypogonadism (HCC)     TROY on CPAP     Pituitary macroadenoma ([facility] December 2014     Past Surgical History:   Procedure Laterality Date    HX COLONOSCOPY  7/14    HX WISDOM TEETH EXTRACTION       Family History   Problem Relation Age of Onset    Hypertension Mother     Diabetes Father     Hypertension Brother     Diabetes Maternal Grandfather     Diabetes Paternal Grandfather      Social History     Tobacco Use    Smoking status: Never Smoker    Smokeless tobacco: Never Used   Substance Use Topics    Alcohol use: Yes       Review of Systems   Constitutional: Negative for chills, diaphoresis, fever, malaise/fatigue and weight loss. HENT: Negative. Eyes: Negative. Respiratory: Negative. Cardiovascular: Negative. Gastrointestinal: Negative. Genitourinary: Negative for dysuria and hematuria. Musculoskeletal: Negative. Skin: Negative. Neurological: Negative. Endo/Heme/Allergies: Negative. Psychiatric/Behavioral: Negative. Objective: There were no vitals taken for this visit. General: alert, cooperative, no distress   Mental  status: normal mood, behavior, speech, dress, motor activity, and thought processes, able to follow commands   HENT: NCAT   Neck: no visualized mass   Resp: no respiratory distress   Neuro: no gross deficits   Skin: no discoloration or lesions of concern on visible areas   Psychiatric: normal affect, consistent with stated mood, no evidence of hallucinations     Additional exam findings:   none    We discussed the expected course, resolution and complications of the diagnosis(es) in detail. Medication risks, benefits, costs, interactions, and alternatives were discussed as indicated. I advised him to contact the office if his condition worsens, changes or fails to improve as anticipated. He expressed understanding with the diagnosis(es) and plan. Edith Delgado is a 38 y.o. male who was evaluated by a video visit encounter for concerns as above. Patient identification was verified prior to start of the visit. A caregiver was present when appropriate. Due to this being a TeleHealth encounter (During [facility] public health emergency), evaluation of the following organ systems was limited: Vitals/Constitutional/EENT/Resp/CV/GI//MS/Neuro/Skin/Heme-Lymph-Imm. Pursuant to the emergency declaration under the [facility] waiver authority and the [employer] General Act, this Virtual  Visit was conducted, with patient's (and/or legal guardian's) consent, to reduce the patient's risk of exposure to COVID-19 and provide necessary medical care. Services were provided through a video synchronous discussion virtually to substitute for in-person clinic visit.   Patient and provider were located at their individual [facility].       Dwight Duane, NP  06/03/20

## 2020-06-04 ENCOUNTER — DOCUMENTATION ONLY (OUTPATIENT)
Dept: FAMILY MEDICINE CLINIC | Age: 45
End: 2020-06-04

## 2020-06-15 ENCOUNTER — TELEPHONE (OUTPATIENT)
Dept: ENDOCRINOLOGY | Age: 45
End: 2020-06-15

## 2020-06-15 DIAGNOSIS — E11.65 TYPE 2 DIABETES MELLITUS WITH HYPERGLYCEMIA, WITH LONG-TERM CURRENT USE OF INSULIN (HCC): Primary | ICD-10-CM

## 2020-06-15 DIAGNOSIS — E22.1 HYPERPROLACTINEMIA (HCC): ICD-10-CM

## 2020-06-15 DIAGNOSIS — Z79.4 TYPE 2 DIABETES MELLITUS WITH HYPERGLYCEMIA, WITH LONG-TERM CURRENT USE OF INSULIN (HCC): ICD-10-CM

## 2020-06-15 DIAGNOSIS — E11.65 TYPE 2 DIABETES MELLITUS WITH HYPERGLYCEMIA, WITH LONG-TERM CURRENT USE OF INSULIN (HCC): ICD-10-CM

## 2020-06-15 DIAGNOSIS — Z79.4 TYPE 2 DIABETES MELLITUS WITH HYPERGLYCEMIA, WITH LONG-TERM CURRENT USE OF INSULIN (HCC): Primary | ICD-10-CM

## 2020-07-21 ENCOUNTER — OFFICE VISIT (OUTPATIENT)
Dept: ENDOCRINOLOGY | Age: 45
End: 2020-07-21

## 2020-07-21 VITALS
HEIGHT: 69 IN | WEIGHT: 315 LBS | SYSTOLIC BLOOD PRESSURE: 148 MMHG | OXYGEN SATURATION: 96 % | RESPIRATION RATE: 20 BRPM | TEMPERATURE: 98.1 F | BODY MASS INDEX: 46.65 KG/M2 | HEART RATE: 81 BPM | DIASTOLIC BLOOD PRESSURE: 75 MMHG

## 2020-07-21 DIAGNOSIS — G47.33 OSA ON CPAP: ICD-10-CM

## 2020-07-21 DIAGNOSIS — E22.1 HYPERPROLACTINEMIA (HCC): ICD-10-CM

## 2020-07-21 DIAGNOSIS — E11.65 TYPE 2 DIABETES MELLITUS WITH HYPERGLYCEMIA, WITH LONG-TERM CURRENT USE OF INSULIN (HCC): Primary | ICD-10-CM

## 2020-07-21 DIAGNOSIS — D35.2 PITUITARY MACROADENOMA (HCC): ICD-10-CM

## 2020-07-21 DIAGNOSIS — Z79.4 TYPE 2 DIABETES MELLITUS WITH HYPERGLYCEMIA, WITH LONG-TERM CURRENT USE OF INSULIN (HCC): Primary | ICD-10-CM

## 2020-07-21 DIAGNOSIS — Z99.89 OSA ON CPAP: ICD-10-CM

## 2020-07-21 DIAGNOSIS — E78.2 MIXED HYPERLIPIDEMIA: ICD-10-CM

## 2020-07-21 DIAGNOSIS — E29.1 HYPOGONADISM MALE: ICD-10-CM

## 2020-07-21 RX ORDER — INSULIN GLARGINE 300 U/ML
75 INJECTION, SOLUTION SUBCUTANEOUS 2 TIMES DAILY
Qty: 90 ML | Refills: 3 | Status: SHIPPED | OUTPATIENT
Start: 2020-07-21 | End: 2021-10-29 | Stop reason: SDUPTHER

## 2020-07-21 RX ORDER — SEMAGLUTIDE 1.34 MG/ML
INJECTION, SOLUTION SUBCUTANEOUS
Qty: 1 BOX | OUTPATIENT
Start: 2020-07-21

## 2020-07-21 RX ORDER — SEMAGLUTIDE 1.34 MG/ML
INJECTION, SOLUTION SUBCUTANEOUS
Qty: 1 BOX | Refills: 3 | Status: SHIPPED | OUTPATIENT
Start: 2020-07-21 | End: 2021-06-19

## 2020-07-21 RX ORDER — INSULIN LISPRO 100 [IU]/ML
INJECTION, SOLUTION INTRAVENOUS; SUBCUTANEOUS
Qty: 45 ML | Refills: 3 | Status: SHIPPED | OUTPATIENT
Start: 2020-07-21 | End: 2020-11-05 | Stop reason: ALTCHOICE

## 2020-07-21 NOTE — PROGRESS NOTES
[facility] DIABETES AND ENDOCRINOLOGY               MD Christine Garcia  1975      History of Present Illness: Farrah Gaines is a 44 y.o. male here for follow-up  He is taking the medication consistently  Reportedly was under a lot of stress due to his partner, was not able to take care of himself, taking the medications but however diet was unhealthy. No hypoglycemia  Gained weight  Complains of tingling in the feet  No severe headache  Blood glucose is fluctuating, requesting prandial insulin because sometimes blood glucoses in 300s and he is taking extra basal insulin which was discouraged    TROY - on Bipap    Type 2 Diabetes was diagnosed in 6yrs ago .   extended-release, insurance did not cover Januvia  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 prolactin    MRI 2016 Sep        Wt Readings from Last 3 Encounters:   07/21/20 347 lb (157.4 kg)   03/11/20 338 lb 3.2 oz (153.4 kg)   03/03/20 344 lb (156 kg)       BP Readings from Last 3 Encounters:   07/21/20 148/75   03/11/20 (!) 155/91   03/03/20 144/66           Past Medical History:   Diagnosis Date    Anxiety     Arthritis     [facility]     DM (diabetes mellitus) ([address]     GERD (gastroesophageal reflux disease)     HTN (hypertension)     Hyperprolactinemia (HCC)     Hypogonadotropic hypogonadism ([address]     TROY on CPAP     Pituitary macroadenoma ([facility] December 2014     Current Outpatient Medications   Medication Sig    semaglutide (Ozempic) 1 mg/dose (2 mg/1.5 mL) sub-q pen 1 mg SC weekly    DULoxetine (CYMBALTA) 60 mg capsule TAKE ONE CAPSULE BY MOUTH ONE TIME DAILY    Blood-Glucose Meter (OneTouch Verio Flex) misc Test TID Dx Code: E11.65    Syringe, Disposable, 3 mL syrg Once Q 2 weeks    Needle, Disp, 22 G 22 gauge x 1 1/2\" ndle Q 2 weeks    glucose blood VI test strips (OneTouch Verio) strip Test TID Dx Code: A77.53    metFORMIN ER (GLUCOPHAGE XR) 500 mg tablet Take 2 Tabs by mouth two (2) times a day.  lisinopril-hydroCHLOROthiazide (Prinzide) 20-12.5 mg per tablet 1 tab in AM    emtricitabine-tenofovir, TDF, (TRUVADA) 200-300 mg per tablet Take 1 Tab by mouth daily.  fluticasone propionate (FLONASE) 50 mcg/actuation nasal spray 2 Sprays by Both Nostrils route daily. (Patient taking differently: 2 Sprays by Both Nostrils route as needed.)    tadalafil (CIALIS) 20 mg tablet Take 1 Tab by mouth as needed (every 5 days). Every 5 days    cabergoline (DOSTINEX) 0.5 mg tablet Take 0.5 Tabs by mouth two (2) times a week.  pravastatin (PRAVACHOL) 20 mg tablet Take 1 Tab by mouth nightly.  Insulin Needles, Disposable, 31 gauge x 5/16\" ndle Use to inject insulin BID. Dx code E11.65    lancets (ONE TOUCH DELICA) 33 gauge misc Test TID Dx Code: E11.65    Insulin Needles, Disposable, 31 gauge x 5/16\" ndle Use to inject insulin 5x daily Dx Code: E11.65    testosterone cypionate (DEPOTESTOTERONE CYPIONATE) 200 mg/mL injection INJECT 1.25ML INTO MUSCLE ONCE EVERY 2 WEEKS    insulin glargine U-300 conc (Toujeo SoloStar U-300 Insulin) 300 unit/mL (1.5 mL) inpn pen 75 Units by SubCUTAneous route two (2) times a day.  insulin lispro (HumaLOG KwikPen Insulin) 100 unit/mL kwikpen Use per SSI. Max units daily: 45     No current facility-administered medications for this visit.       Allergies   Allergen Reactions    Amoxicillin Hives    Erythromycin Other (comments)     Stomach cramps    Penicillins Hives    Sulfa (Sulfonamide Antibiotics) Hives         Review of Systems:  - Constitutional Symptoms: no fevers, no chills  - Eyes: no blurry vision no double vision  - Cardiovascular: no chest pain ,no palpitations  - Respiratory: no cough no shortness of breath  - Gastrointestinal: no dysphagia no  abdominal pain  - Musculoskeletal: no joint pains ,  weakness  - Integumentary: no rashes  - Neurological: no numbness, tingling, no headaches  -     Physical Examination:   Blood pressure 148/75, pulse 81, temperature 98.1 °F (36.7 °C), temperature source Oral, resp. rate 20, height 5' 9\" (1.753 m), weight 347 lb (157.4 kg), SpO2 96 %. Estimated body mass index is 51.24 kg/m² as calculated from the following:    Height as of this encounter: 5' 9\" (1.753 m). -   Weight as of this encounter: 347 lb (157.4 kg).   - General: pleasant, no distress, good eye contact  - HEENT: no pallor, no periorbital edema, EOMI  - Neck: supple, no thyromegaly, thick neck  - Cardiovascular: regular, normal rate, normal S1 and S2  - Respiratory: clear to auscultation bilaterally  - Gastrointestinal: soft, nontender, nondistended,  BS +  - Musculoskeletal:,alert and oriented  - Psychiatric: normal mood and affect  - Skin: color, texture, turgor normal.     Diabetic foot exam: July 2019    Left:     Vibratory sensation decreased   Filament test normal sensation with micro filament   Pulse DP: 1+    Deformities: None  Right:    Vibratory sensation decreased   Filament test normal sensation with micro filament   Pulse DP: 1+   Deformities: None      Data Reviewed:       Lab Results   Component Value Date/Time    WBC 9.3 07/23/2019 02:06 PM    HGB 14.0 07/23/2019 02:06 PM    HCT 41.9 07/23/2019 02:06 PM    PLATELET 614 95/12/4915 02:06 PM    MCV 84 07/23/2019 02:06 PM     Lab Results   Component Value Date/Time    Hemoglobin A1c 7.0 (H) 02/25/2020 03:39 PM    Hemoglobin A1c 11.2 (H) 02/13/2018 02:00 PM    Hemoglobin A1c 10.7 (H) 11/18/2014 09:57 AM    Microalbumin/Creat ratio (mg/g creat) 25 02/25/2020 03:39 PM    Microalbumin,urine random 4.96 02/25/2020 03:39 PM    LDL,Direct 116 (H) 02/25/2020 03:39 PM    LDL, calculated 110 (H) 02/13/2018 02:00 PM    Creatinine 0.92 02/25/2020 03:39 PM      Lab Results   Component Value Date/Time    Cholesterol, total 169 02/13/2018 02:00 PM    HDL Cholesterol 29 (L) 02/13/2018 02:00 PM    LDL,Direct 116 (H) 02/25/2020 03:39 PM    LDL, calculated 110 (H) 02/13/2018 02:00 PM    Triglyceride 149 02/13/2018 02:00 PM     Lab Results   Component Value Date/Time    Alk. phosphatase 59 02/25/2020 03:39 PM     Lab Results   Component Value Date/Time    GFR est AA >60 02/25/2020 03:39 PM    GFR est non-AA >60 02/25/2020 03:39 PM    Creatinine 0.92 02/25/2020 03:39 PM    BUN 14 02/25/2020 03:39 PM    Sodium 134 (L) 02/25/2020 03:39 PM    Potassium 4.6 02/25/2020 03:39 PM    Chloride 102 02/25/2020 03:39 PM    CO2 28 02/25/2020 03:39 PM      Lab Results   Component Value Date/Time    Prostate Specific Ag 0.6 07/23/2019 02:06 PM    Prostate Specific Ag 0.2 02/19/2018 03:02 PM    Prostate Specific Ag 0.9 11/18/2014 09:57 AM     Lab Results   Component Value Date/Time    TSH 1.800 08/08/2013 11:35 AM    T4, Free 1.38 01/02/2014 08:42 AM      Component      Latest Ref Rng 8/8/2013   TESTOSTERONE, TOTAL, LC/MS      348.0 - 1197.0 ng/dL 15.4 (L)   TESTOSTERONE, FREE      5.00 - 21.00 ng/dL 0.67 (L)   % Free testosterone      1.50 - 4.20 % 4.36 (H)   Luteinizing hormone      1.7 - 8.6 mIU/mL 0.5 (L)   FSH      1.5 - 12.4 mIU/mL 0.8 (L)   Prolactin      4.0 - 15.2 ng/mL 130.7 (H)      Lab Results   Component Value Date/Time    Testosterone 206 (L) 07/10/2014 03:42 PM     Lab Results   Component Value Date/Time    FSH 0.8 (L) 08/08/2013 11:35 AM    Luteinizing hormone 0.5 (L) 08/08/2013 11:35 AM    Prolactin 5.0 02/25/2020 03:39 PM           Lab Results   Component Value Date/Time    Hemoglobin A1c 7.0 (H) 02/25/2020 03:39 PM    Hemoglobin A1c 11.2 (H) 02/13/2018 02:00 PM    Hemoglobin A1c 10.7 (H) 11/18/2014 09:57 AM    Microalbumin/Creat ratio (mg/g creat) 25 02/25/2020 03:39 PM    Microalbumin,urine random 4.96 02/25/2020 03:39 PM    LDL,Direct 116 (H) 02/25/2020 03:39 PM    LDL, calculated 110 (H) 02/13/2018 02:00 PM    Creatinine 0.92 02/25/2020 03:39 PM      MRI 2014: normal      Assessment/Plan:     1.  Type 2 Diabetes Mellitus with retinopathy  Lab Results Component Value Date/Time    Hemoglobin A1c 7.0 (H) 02/25/2020 03:39 PM    Hemoglobin A1c (POC) 6.7 10/31/2019 09:36 AM     Counseled  Metformin XR  Insulin glargine insulin 75 units BID  Prandial insulin  Trulicity: To find out about Ozempic  Was on farxiga   FLU annually ,Pneumovax ,aspirin daily,annual eye exam,microalbumin. poor eating habits   High risk for complications, symptomatic hyperglycemia    2. Hyperlipidemia: Low-cholesterol diet  Statin    3. HTN :prinzide    4. TROY- on CPAP    5. 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 Macroadenoma 1.6 cm -prolactin secreting  MRI pituitary in August 2014 -normal MRI  MRI 2016: Small microadenoma  MRI 2019: No adenoma    Cabergoline 0.5 pill weekly     6.   Diabetic retinopathy: Followed by ophthalmology      Adam Cochran MD      Patient verbalized understanding

## 2020-07-21 NOTE — LETTER
7/22/20 Patient: Lavern Dhaliwal YOB: 1975 Date of Visit: 7/21/2020 Iggy Culver NP 
[facility] [phone] VIA In Basket Dear Igyg Culver NP, Thank you for referring Mr. Jose Antonio Mora to [address] [place] [address] for evaluation. My notes for this consultation are attached. If you have questions, please do not hesitate to call me. I look forward to following your patient along with you. Sincerely, Selena Munguia MD

## 2020-07-21 NOTE — TELEPHONE ENCOUNTER
Pt checked with his insurance Ruddy Jerez is not covered but Toujeo would be and with savings card it is $0. Provided pt with savings card. He stated Damien Hodgkin is covered with PA, he did fail Trulicity (hd severe N/V and diarrhea). Informed him I will let Dr. Oral Duke know. [facility]

## 2020-07-21 NOTE — PROGRESS NOTES
Jb Denny is a 38 y.o. male here for   Chief Complaint   Patient presents with    Diabetes    Pituitary Problem    Hyperprolactinemia       1. Have you been to the ER, urgent care clinic since your last visit? Hospitalized since your last visit? -no    2. Have you seen or consulted any other health care providers outside of the [address] [place] [address] since your last visit? Include any pap smears or colon screening. -[phone] [facility]    Order placed for pt per verbal order with read back from Dr. Hernandez Dense 07/21/20

## 2020-07-21 NOTE — PATIENT INSTRUCTIONS
Humalog or Novolog     Blood sugar  Fast acting insulin          150-200  Extra 3 Units       201-250  Extra 6 Units       251-300  Extra 9 Units       301-350  Extra 12  Units        351-400  Extra 15  Units

## 2020-09-10 ENCOUNTER — DOCUMENTATION ONLY (OUTPATIENT)
Dept: FAMILY MEDICINE CLINIC | Age: 45
End: 2020-09-10

## 2020-09-10 ENCOUNTER — VIRTUAL VISIT (OUTPATIENT)
Dept: FAMILY MEDICINE CLINIC | Age: 45
End: 2020-09-10
Payer: COMMERCIAL

## 2020-09-10 DIAGNOSIS — R06.09 DYSPNEA ON EXERTION: ICD-10-CM

## 2020-09-10 DIAGNOSIS — R53.82 CHRONIC FATIGUE: ICD-10-CM

## 2020-09-10 DIAGNOSIS — R53.82 CHRONIC FATIGUE: Primary | ICD-10-CM

## 2020-09-10 DIAGNOSIS — E29.1 HYPOGONADISM MALE: ICD-10-CM

## 2020-09-10 PROCEDURE — 99213 OFFICE O/P EST LOW 20 MIN: CPT | Performed by: NURSE PRACTITIONER

## 2020-09-10 RX ORDER — TADALAFIL 20 MG/1
TABLET ORAL
Qty: 6 TAB | Refills: 6 | Status: SHIPPED | OUTPATIENT
Start: 2020-09-10 | End: 2021-06-19

## 2020-09-10 NOTE — PROGRESS NOTES
Consent: Prasanna Wetzel, who was seen by synchronous (real-time) audio-video technology, and/or his healthcare decision maker, is aware that this patient-initiated, Telehealth encounter on 9/10/2020 is a billable service, with coverage as determined by his insurance carrier. He is aware that he may receive a bill and has provided verbal consent to proceed: Yes. 712      Subjective:   Prasanna Wetzel is a 54 y.o. male who was seen for Concern For COVID-19 (Coronavirus) (patient has a virtual appointment today to discuss covid symptoms)  Pt states he believes he had COVID back in March and has had residual fatigue ever since. States he had appt with [facility] 2 weeks ago and had negative antibody test   Had sick episode in March and was sent for CXR b/c he was having persistent cough w/ bloody sputum despite course of doxy  His TB skin test was negative, did not go through w/ CXR  Pt states since this episode he has noticed increased fatigue and SANDOVAL, which is why he pursued antibody testing  Notes he has had nausea and diarrhea for over a month but believes this is d/t one of his diabetic medications. He is followed by [facility] for DM. Pt works in the school system and notes that he gets off around 3-4:30 and often easily falls asleep after work. Typically naps for a few hours, gets up and then goes back to sleep around 11PM. Notes that more recently he has been able to fall asleep after work and remain asleep until the following day  He does have hx of sleep apnea and has been using CPAP for years, does not sleep without it. Notes that he has noticed getting more winded when walking from building to building as well. Pt notes that he is on Truvada but has not yet gotten his 3mo f/u labs that were ordered during virtual visit in June, would like to know if he can get those drawn while he is getting labs for fatigue. Prior to Admission medications    Medication Sig Start Date End Date Taking?  Authorizing Provider   testosterone cypionate (DEPOTESTOTERONE CYPIONATE) 200 mg/mL injection INJECT 1.25ML INTO MUSCLE ONCE EVERY 2 WEEKS 7/22/20  Yes Tomasz Pak MD   insulin glargine U-300 conc (Toujeo SoloStar U-300 Insulin) 300 unit/mL (1.5 mL) inpn pen 75 Units by SubCUTAneous route two (2) times a day. 7/21/20  Yes Tomasz Pak MD   insulin lispro (HumaLOG KwikPen Insulin) 100 unit/mL kwikpen Use per SSI. Max units daily: 45 7/21/20  Yes Tomasz Pak MD   semaglutide (Ozempic) 1 mg/dose (2 mg/1.5 mL) sub-q pen 1 mg SC weekly 7/21/20  Yes Tomasz Pak MD   DULoxetine (CYMBALTA) 60 mg capsule TAKE ONE CAPSULE BY MOUTH ONE TIME DAILY 6/2/20  Yes Nathalie Maloney NP   Blood-Glucose Meter (OneTouch Verio Flex) misc Test TID Dx Code: E11.65 4/21/20  Yes Tomasz Pak MD   Syringe, Disposable, 3 mL syrg Once Q 2 weeks 4/21/20  Yes Tomasz Pak MD   Needle, Disp, 22 G 22 gauge x 1 1/2\" ndle Q 2 weeks 4/21/20  Yes Tomasz Pak MD   glucose blood VI test strips (OneTouch Verio) strip Test TID Dx Code: E11.65 4/21/20  Yes Tomasz Pak MD   metFORMIN ER (GLUCOPHAGE XR) 500 mg tablet Take 2 Tabs by mouth two (2) times a day. 4/21/20  Yes Tomasz Pak MD   lisinopril-hydroCHLOROthiazide (Prinzide) 20-12.5 mg per tablet 1 tab in AM 4/21/20  Yes Tomasz Pak MD   emtricitabine-tenofovir, TDF, (TRUVADA) 200-300 mg per tablet Take 1 Tab by mouth daily. 2/5/20  Yes Sherman Maloney, NP   fluticasone propionate (FLONASE) 50 mcg/actuation nasal spray 2 Sprays by Both Nostrils route daily. Patient taking differently: 2 Sprays by Both Nostrils route as needed. 11/26/19  Yes Sherman Maloney, NP   tadalafil (CIALIS) 20 mg tablet Take 1 Tab by mouth as needed (every 5 days). Every 5 days 9/5/19  Yes Tomasz Pak MD   cabergoline (DOSTINEX) 0.5 mg tablet Take 0.5 Tabs by mouth two (2) times a week.  7/26/19  Yes Tomasz Pak MD   pravastatin (PRAVACHOL) 20 mg tablet Take 1 Tab by mouth nightly. 7/23/19  Yes Itz Burdick MD   Insulin Needles, Disposable, 31 gauge x 5/16\" ndle Use to inject insulin BID. Dx code E11.65 7/23/19  Yes Itz Burdick MD   lancets (ONE TOUCH DELICA) 33 gauge misc Test TID Dx Code: E11.65 2/19/18  Yes Itz Burdick MD   Insulin Needles, Disposable, 31 gauge x 5/16\" ndle Use to inject insulin 5x daily Dx Code: E11.65 2/19/18  Yes Itz Burdick MD     Allergies   Allergen Reactions    Amoxicillin Hives    Erythromycin Other (comments)     Stomach cramps    Penicillins Hives    Sulfa (Sulfonamide Antibiotics) Hives       Patient Active Problem List    Diagnosis Date Noted    Moderate episode of recurrent major depressive disorder ([facility] 03/11/2020    SHIMON (generalized anxiety disorder) 03/11/2020    Obesity, morbid ([facility] 02/19/2018    Pituitary macroadenoma ([facility] 02/15/2014    Other and unspecified anterior pituitary hyperfunction 01/09/2014    Hyperprolactinemia ([facility] 11/09/2013    Hyperlipidemia 11/09/2013    Type 2 diabetes mellitus with retinopathy, without long-term current use of insulin ([facility] 08/07/2013    HTN (hypertension) 08/07/2013    TROY on CPAP 08/07/2013    Hypogonadism male 08/07/2013       ROS - negative except as listed above in the HPI      Objective:   Vital Signs: (As obtained by patient/caregiver at home)  There were no vitals taken for this visit.      Physical Exam:   General: alert, cooperative, no distress   Mental  status: normal mood, behavior, speech, dress, motor activity, and thought processes, able to follow commands   HEENT: normocephalic, atraumatic    Eyes:  extraocular movements intact, sclera normal, no visible discharge   Ears:  external ears normal   Mouth/Throat:  mucous memrates appear moist    Neck: no visualized mass   Resp: respiratory effort is normal, breathing appears non-labored, no visualized signs of respiratory distress   Neuro: no gross deficits   Skin: no discoloration or lesions of concern on visible areas   Psychiatric: normal affect, consistent with stated mood, no evidence of hallucinations      Additional exam findings:      Assessment & Plan:   Diagnoses and all orders for this visit:    1. Chronic fatigue  2. Dyspnea on exertion  -     CBC WITH AUTOMATED DIFF; Future  -     TSH 3RD GENERATION; Future  - Will notify patient of results when available and alter plan as needed  - Nurse to fax labs from today visit and outstanding labs from Yi visit to [employer]           Follow-up and Dispositions    · Return if symptoms worsen or fail to improve. I spent at least 15 minutes with this established patient, and >50% of the time was spent counseling and/or coordinating care regarding chronic fatigue, dyspena on exertion, COVID antibody test      We discussed the expected course, resolution and complications of the diagnosis(es) in detail. Medication risks, benefits, costs, interactions, and alternatives were discussed as indicated. I advised him to contact the office if his condition worsens, changes or fails to improve as anticipated. He expressed understanding with the diagnosis(es) and plan. Ginger Cox is a 54 y.o. male being evaluated by a video visit encounter for concerns as above. A caregiver was present when appropriate. Due to this being a TeleHealth encounter (During [facility] public health emergency), evaluation of the following organ systems was limited: Vitals/Constitutional/EENT/Resp/CV/GI//MS/Neuro/Skin/Heme-Lymph-Imm. Pursuant to the emergency declaration under the [facility], 1135 waiver authority and the [employer] and [employer] Act, this Virtual  Visit was conducted, with patient's (and/or legal guardian's) consent, to reduce the patient's risk of exposure to COVID-19 and provide necessary medical care.      Services were provided through a video synchronous discussion virtually to substitute for in-person clinic visit. Patient and provider were located at their individual homes.         Glenda Crockett NP

## 2020-09-10 NOTE — PROGRESS NOTES
Fax was sent over to [employer] regarding patients lab orders 3 times and paper confirmation was made both times as well.

## 2020-09-12 LAB
BASOPHILS # BLD AUTO: 0.1 X10E3/UL (ref 0–0.2)
BASOPHILS NFR BLD AUTO: 1 %
BUN SERPL-MCNC: 16 MG/DL (ref 6–24)
BUN/CREAT SERPL: 16 (ref 9–20)
CALCIUM SERPL-MCNC: 10.2 MG/DL (ref 8.7–10.2)
CHLORIDE SERPL-SCNC: 98 MMOL/L (ref 96–106)
CO2 SERPL-SCNC: 27 MMOL/L (ref 20–29)
CREAT SERPL-MCNC: 1.02 MG/DL (ref 0.76–1.27)
EOSINOPHIL # BLD AUTO: 0.2 X10E3/UL (ref 0–0.4)
EOSINOPHIL NFR BLD AUTO: 2 %
ERYTHROCYTE [DISTWIDTH] IN BLOOD BY AUTOMATED COUNT: 13.5 % (ref 11.6–15.4)
GLUCOSE SERPL-MCNC: 192 MG/DL (ref 65–99)
HCT VFR BLD AUTO: 44.1 % (ref 37.5–51)
HGB BLD-MCNC: 14.6 G/DL (ref 13–17.7)
HIV 1+2 AB+HIV1 P24 AG SERPL QL IA: NON REACTIVE
IMM GRANULOCYTES # BLD AUTO: 0 X10E3/UL (ref 0–0.1)
IMM GRANULOCYTES NFR BLD AUTO: 0 %
LYMPHOCYTES # BLD AUTO: 2.8 X10E3/UL (ref 0.7–3.1)
LYMPHOCYTES NFR BLD AUTO: 28 %
MCH RBC QN AUTO: 28.1 PG (ref 26.6–33)
MCHC RBC AUTO-ENTMCNC: 33.1 G/DL (ref 31.5–35.7)
MCV RBC AUTO: 85 FL (ref 79–97)
MONOCYTES # BLD AUTO: 0.7 X10E3/UL (ref 0.1–0.9)
MONOCYTES NFR BLD AUTO: 7 %
NEUTROPHILS # BLD AUTO: 6.3 X10E3/UL (ref 1.4–7)
NEUTROPHILS NFR BLD AUTO: 62 %
PLATELET # BLD AUTO: 348 X10E3/UL (ref 150–450)
POTASSIUM SERPL-SCNC: 5.1 MMOL/L (ref 3.5–5.2)
RBC # BLD AUTO: 5.19 X10E6/UL (ref 4.14–5.8)
SODIUM SERPL-SCNC: 137 MMOL/L (ref 134–144)
TSH SERPL DL<=0.005 MIU/L-ACNC: 1.28 UIU/ML (ref 0.45–4.5)
WBC # BLD AUTO: 10.1 X10E3/UL (ref 3.4–10.8)

## 2020-09-14 NOTE — PROGRESS NOTES
Results released via [employer] as follows:    Hi Mr Niki Castro,    Your lab results show that you are not anemic, your hemoglobin is normal and your white blood cell count is also normal. Your thyroid level is normal as well, which means that you are not hyper or hypothyroid. There is no clear indication on why you are having so much fatigue based on these labs. You may want to consider an updated sleep study to ensure your CPAP is fitting properly and the settings are appropriate.      Julee Otero, NP

## 2020-09-16 NOTE — PROGRESS NOTES
Attempted to reach pt.  No answer, unable to leave vm. Pt's vm is full    Result letter mailed to pt

## 2020-10-01 DIAGNOSIS — E11.65 TYPE 2 DIABETES MELLITUS WITH HYPERGLYCEMIA, WITH LONG-TERM CURRENT USE OF INSULIN (HCC): ICD-10-CM

## 2020-10-01 DIAGNOSIS — Z79.4 TYPE 2 DIABETES MELLITUS WITH HYPERGLYCEMIA, WITH LONG-TERM CURRENT USE OF INSULIN (HCC): ICD-10-CM

## 2020-10-05 DIAGNOSIS — F33.1 MODERATE EPISODE OF RECURRENT MAJOR DEPRESSIVE DISORDER (HCC): ICD-10-CM

## 2020-10-05 DIAGNOSIS — F41.1 GAD (GENERALIZED ANXIETY DISORDER): ICD-10-CM

## 2020-10-05 DIAGNOSIS — Z79.899 ON PRE-EXPOSURE PROPHYLAXIS FOR HIV: ICD-10-CM

## 2020-10-05 DIAGNOSIS — Z72.51 HIGH RISK SEXUAL BEHAVIOR, UNSPECIFIED TYPE: ICD-10-CM

## 2020-10-05 RX ORDER — DULOXETIN HYDROCHLORIDE 60 MG/1
CAPSULE, DELAYED RELEASE ORAL
Qty: 30 CAP | Refills: 1 | Status: SHIPPED | OUTPATIENT
Start: 2020-10-05 | End: 2021-02-06

## 2020-10-07 RX ORDER — EMTRICITABINE AND TENOFOVIR DISOPROXIL FUMARATE 200; 300 MG/1; MG/1
1 TABLET, FILM COATED ORAL DAILY
Qty: 30 TAB | Refills: 2 | Status: SHIPPED | OUTPATIENT
Start: 2020-10-07 | End: 2021-04-12 | Stop reason: ALTCHOICE

## 2020-10-23 ENCOUNTER — TELEPHONE (OUTPATIENT)
Dept: ENDOCRINOLOGY | Age: 45
End: 2020-10-23

## 2020-10-23 DIAGNOSIS — E22.1 HYPERPROLACTINEMIA (HCC): ICD-10-CM

## 2020-10-23 DIAGNOSIS — Z79.4 TYPE 2 DIABETES MELLITUS WITH HYPERGLYCEMIA, WITH LONG-TERM CURRENT USE OF INSULIN (HCC): ICD-10-CM

## 2020-10-23 DIAGNOSIS — E29.1 HYPOGONADISM MALE: Primary | ICD-10-CM

## 2020-10-23 DIAGNOSIS — E11.65 TYPE 2 DIABETES MELLITUS WITH HYPERGLYCEMIA, WITH LONG-TERM CURRENT USE OF INSULIN (HCC): ICD-10-CM

## 2020-10-23 DIAGNOSIS — E78.2 MIXED HYPERLIPIDEMIA: ICD-10-CM

## 2020-10-29 PROBLEM — E11.65 TYPE 2 DIABETES MELLITUS WITH HYPERGLYCEMIA, WITH LONG-TERM CURRENT USE OF INSULIN (HCC): Status: ACTIVE | Noted: 2020-10-29

## 2020-10-29 PROBLEM — Z79.4 TYPE 2 DIABETES MELLITUS WITH HYPERGLYCEMIA, WITH LONG-TERM CURRENT USE OF INSULIN (HCC): Status: ACTIVE | Noted: 2020-10-29

## 2020-11-05 ENCOUNTER — OFFICE VISIT (OUTPATIENT)
Dept: ENDOCRINOLOGY | Age: 45
End: 2020-11-05
Payer: COMMERCIAL

## 2020-11-05 VITALS
OXYGEN SATURATION: 98 % | DIASTOLIC BLOOD PRESSURE: 86 MMHG | TEMPERATURE: 98.7 F | WEIGHT: 315 LBS | HEIGHT: 69 IN | SYSTOLIC BLOOD PRESSURE: 151 MMHG | RESPIRATION RATE: 20 BRPM | HEART RATE: 82 BPM | BODY MASS INDEX: 46.65 KG/M2

## 2020-11-05 DIAGNOSIS — I10 ESSENTIAL HYPERTENSION: ICD-10-CM

## 2020-11-05 DIAGNOSIS — E11.65 TYPE 2 DIABETES MELLITUS WITH HYPERGLYCEMIA, WITH LONG-TERM CURRENT USE OF INSULIN (HCC): Primary | ICD-10-CM

## 2020-11-05 DIAGNOSIS — E78.2 MIXED HYPERLIPIDEMIA: ICD-10-CM

## 2020-11-05 DIAGNOSIS — Z79.4 TYPE 2 DIABETES MELLITUS WITH HYPERGLYCEMIA, WITH LONG-TERM CURRENT USE OF INSULIN (HCC): Primary | ICD-10-CM

## 2020-11-05 DIAGNOSIS — G47.33 OSA ON CPAP: ICD-10-CM

## 2020-11-05 DIAGNOSIS — Z99.89 OSA ON CPAP: ICD-10-CM

## 2020-11-05 DIAGNOSIS — D35.2 PITUITARY MACROADENOMA (HCC): ICD-10-CM

## 2020-11-05 DIAGNOSIS — E22.1 HYPERPROLACTINEMIA (HCC): ICD-10-CM

## 2020-11-05 DIAGNOSIS — E29.1 HYPOGONADISM MALE: ICD-10-CM

## 2020-11-05 PROCEDURE — 99215 OFFICE O/P EST HI 40 MIN: CPT | Performed by: INTERNAL MEDICINE

## 2020-11-05 RX ORDER — INSULIN LISPRO 200 [IU]/ML
INJECTION, SOLUTION SUBCUTANEOUS
Qty: 30 ML | Refills: 11 | Status: SHIPPED | OUTPATIENT
Start: 2020-11-05 | End: 2022-09-14 | Stop reason: ALTCHOICE

## 2020-11-05 RX ORDER — LOSARTAN POTASSIUM AND HYDROCHLOROTHIAZIDE 25; 100 MG/1; MG/1
1 TABLET ORAL DAILY
Qty: 90 TAB | Refills: 3 | Status: SHIPPED | OUTPATIENT
Start: 2020-11-05 | End: 2022-09-09 | Stop reason: SDUPTHER

## 2020-11-05 NOTE — PROGRESS NOTES
[facility] DIABETES AND ENDOCRINOLOGY               MD Mohini Carpenter  1975    Chief complaint: Diabetes mellitus, hypogonadism follow-up      History of Present Illness: Britany Aburto is a 44 y.o. male here for follow-up  Type 2 diabetes mellitus: On MDI  Forgot to bring the meter  Hypoglycemia during the daytime  Taking medications consistently  Diet could be better  No weight loss  Blood pressure has been elevated  No severe headache  Humalog U-200 was working  better    TROY - on Bipap    Type 2 Diabetes was diagnosed 6yrs ago . 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 prolactin    MRI 2016 Sep        Wt Readings from Last 3 Encounters:   11/05/20 348 lb (157.9 kg)   07/21/20 347 lb (157.4 kg)   03/11/20 338 lb 3.2 oz (153.4 kg)       BP Readings from Last 3 Encounters:   11/05/20 (!) 151/86   07/21/20 148/75   03/11/20 (!) 155/91           Past Medical History:   Diagnosis Date    Anxiety     Arthritis     Diabetic neuropathy ([address]     DM (diabetes mellitus) ([address]     GERD (gastroesophageal reflux disease)     HTN (hypertension)     Hyperprolactinemia ([address]     Hypogonadotropic hypogonadism ([address]     TROY on CPAP     Pituitary macroadenoma ([facility] December 2014     Current Outpatient Medications   Medication Sig    emtricitabine-tenofovir, TDF, (Truvada) 200-300 mg per tablet Take 1 Tab by mouth daily.  DULoxetine (CYMBALTA) 60 mg capsule TAKE ONE CAPSULE BY MOUTH ONE TIME DAILY    tadalafiL (CIALIS) 20 mg tablet TAKE ONE TABLET BY MOUTH ONCE EVERY 5 DAYS AS NEEDED    testosterone cypionate (DEPOTESTOTERONE CYPIONATE) 200 mg/mL injection INJECT 1.25ML INTO MUSCLE ONCE EVERY 2 WEEKS    insulin glargine U-300 conc (Toujeo SoloStar U-300 Insulin) 300 unit/mL (1.5 mL) inpn pen 75 Units by SubCUTAneous route two (2) times a day.     insulin lispro (HumaLOG KwikPen Insulin) 100 unit/mL kwikpen Use per SSI. Max units daily: 45    semaglutide (Ozempic) 1 mg/dose (2 mg/1.5 mL) sub-q pen 1 mg SC weekly    Blood-Glucose Meter (OneTouch Verio Flex) misc Test TID Dx Code: E11.65    Syringe, Disposable, 3 mL syrg Once Q 2 weeks    Needle, Disp, 22 G 22 gauge x 1 1/2\" ndle Q 2 weeks    glucose blood VI test strips (OneTouch Verio) strip Test TID Dx Code: E11.65    metFORMIN ER (GLUCOPHAGE XR) 500 mg tablet Take 2 Tabs by mouth two (2) times a day.  lisinopril-hydroCHLOROthiazide (Prinzide) 20-12.5 mg per tablet 1 tab in AM    fluticasone propionate (FLONASE) 50 mcg/actuation nasal spray 2 Sprays by Both Nostrils route daily. (Patient taking differently: 2 Sprays by Both Nostrils route as needed.)    cabergoline (DOSTINEX) 0.5 mg tablet Take 0.5 Tabs by mouth two (2) times a week.  pravastatin (PRAVACHOL) 20 mg tablet Take 1 Tab by mouth nightly.  Insulin Needles, Disposable, 31 gauge x 5/16\" ndle Use to inject insulin BID. Dx code E11.65    lancets (ONE TOUCH DELICA) 33 gauge misc Test TID Dx Code: E11.65    Insulin Needles, Disposable, 31 gauge x 5/16\" ndle Use to inject insulin 5x daily Dx Code: E11.65     No current facility-administered medications for this visit. Allergies   Allergen Reactions    Amoxicillin Hives    Erythromycin Other (comments)     Stomach cramps    Penicillins Hives    Sulfa (Sulfonamide Antibiotics) Hives         Review of Systems:  - Constitutional Symptoms: no fevers, no chills  - Eyes: no blurry vision no double vision  - Cardiovascular: no chest pain ,no palpitations  - Respiratory: no cough no shortness of breath  - Gastrointestinal: no dysphagia no  abdominal pain  - Musculoskeletal: no joint pains ,  weakness  - Integumentary: no rashes  - Neurological: no numbness, tingling, no  headaches  -     Physical Examination:   Blood pressure (!) 151/86, pulse 82, temperature 98.7 °F (37.1 °C), temperature source Oral, resp.  rate 20, height 5' 9\" (1.753 m), weight 348 lb (157.9 kg), SpO2 98 %. Estimated body mass index is 51.39 kg/m² as calculated from the following:    Height as of this encounter: 5' 9\" (1.753 m). -   Weight as of this encounter: 348 lb (157.9 kg).     General: pleasant, no distress, good eye contact  HEENT: no exopthalmos, no periorbital edema, no scleral/conjunctival injection, EOMI, no lid lag or stare  Neck: supple, no thyromegaly, no nodules,no lymphadenopathy  Cardiovascular: regular, normal rate, normal S1 and S2,  Respiratory: clear to auscultation bilaterally, no respiratory distress  Gastrointestinal: soft, nontender, nondistended,   Musculoskeletal: no proximal muscle weakness in upper or lower extremities  Integumentary: no tremors, no edema,  Neurological:alert and oriented , no focal deficits  Psychiatric: normal mood and affect   Skin: Normal turgor, no rash    Diabetic foot exam: July 2019    Left:     Vibratory sensation decreased   Filament test normal sensation with micro filament   Pulse DP: 1+    Deformities: None  Right:    Vibratory sensation decreased   Filament test normal sensation with micro filament   Pulse DP: 1+   Deformities: None      Data Reviewed:       Lab Results   Component Value Date/Time    WBC 10.1 09/11/2020 12:48 PM    HGB 14.6 09/11/2020 12:48 PM    HCT 44.1 09/11/2020 12:48 PM    PLATELET 806 59/84/2627 12:48 PM    MCV 85 09/11/2020 12:48 PM     Lab Results   Component Value Date/Time    Hemoglobin A1c 9.6 (H) 10/26/2020 04:03 PM    Hemoglobin A1c 7.0 (H) 02/25/2020 03:39 PM    Hemoglobin A1c 11.2 (H) 02/13/2018 02:00 PM    Microalbumin/Creat ratio (mg/g creat) 25 02/25/2020 03:39 PM    Microalbumin,urine random 4.96 02/25/2020 03:39 PM    LDL,Direct 150 (H) 10/26/2020 04:03 PM    LDL, calculated 110 (H) 02/13/2018 02:00 PM    Creatinine 0.87 10/26/2020 04:03 PM      Lab Results   Component Value Date/Time    Cholesterol, total 169 02/13/2018 02:00 PM    HDL Cholesterol 29 (L) 02/13/2018 02:00 PM    LDL,Direct 150 (H) 10/26/2020 04:03 PM    LDL, calculated 110 (H) 02/13/2018 02:00 PM    Triglyceride 149 02/13/2018 02:00 PM     Lab Results   Component Value Date/Time    Alk. phosphatase 59 02/25/2020 03:39 PM     Lab Results   Component Value Date/Time    GFR est AA >60 10/26/2020 04:03 PM    GFR est non-AA >60 10/26/2020 04:03 PM    Creatinine 0.87 10/26/2020 04:03 PM    BUN 12 10/26/2020 04:03 PM    Sodium 135 (L) 10/26/2020 04:03 PM    Potassium 4.4 10/26/2020 04:03 PM    Chloride 97 10/26/2020 04:03 PM    CO2 29 10/26/2020 04:03 PM      Lab Results   Component Value Date/Time    Prostate Specific Ag 0.6 07/23/2019 02:06 PM    Prostate Specific Ag 0.2 02/19/2018 03:02 PM    Prostate Specific Ag 0.9 11/18/2014 09:57 AM     Lab Results   Component Value Date/Time    TSH 1.280 09/11/2020 12:48 PM    T4, Free 1.38 01/02/2014 08:42 AM      Component      Latest Ref Rng 8/8/2013   TESTOSTERONE, TOTAL, LC/MS      348.0 - 1197.0 ng/dL 15.4 (L)   TESTOSTERONE, FREE      5.00 - 21.00 ng/dL 0.67 (L)   % Free testosterone      1.50 - 4.20 % 4.36 (H)   Luteinizing hormone      1.7 - 8.6 mIU/mL 0.5 (L)   FSH      1.5 - 12.4 mIU/mL 0.8 (L)   Prolactin      4.0 - 15.2 ng/mL 130.7 (H)      Lab Results   Component Value Date/Time    Testosterone 206 (L) 07/10/2014 03:42 PM     Lab Results   Component Value Date/Time    FSH 0.8 (L) 08/08/2013 11:35 AM    Luteinizing hormone 0.5 (L) 08/08/2013 11:35 AM    Prolactin 9.5 10/26/2020 04:03 PM           Lab Results   Component Value Date/Time    Hemoglobin A1c 9.6 (H) 10/26/2020 04:03 PM    Hemoglobin A1c 7.0 (H) 02/25/2020 03:39 PM    Hemoglobin A1c 11.2 (H) 02/13/2018 02:00 PM    Microalbumin/Creat ratio (mg/g creat) 25 02/25/2020 03:39 PM    Microalbumin,urine random 4.96 02/25/2020 03:39 PM    LDL,Direct 150 (H) 10/26/2020 04:03 PM    LDL, calculated 110 (H) 02/13/2018 02:00 PM    Creatinine 0.87 10/26/2020 04:03 PM      MRI 2014: normal      Assessment/Plan:     1.  Type 2 Diabetes Mellitus with retinopathy  Lab Results   Component Value Date/Time    Hemoglobin A1c 9.6 (H) 10/26/2020 04:03 PM    Hemoglobin A1c (POC) 6.7 10/31/2019 09:36 AM   high risk for hypoglycemia due to high dose of insulin   Uncontrolled, motivated to get better control  Metformin XR  Insulin glargine insulin 75 units BID  Prandial insulin 40 - 50 units    Ozempic  Was on [address] weight loss programs discussed   FLU annually ,Pneumovax ,aspirin daily,annual eye exam,microalbumin. High risk for complications, symptomatic hyperglycemia    2. Hyperlipidemia: Low-cholesterol diet  Statin    3. HTN :uncontrolled     4. TROY- on CPAP    5. 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 Macroadenoma 1.6 cm -prolactin secreting  MRI pituitary in August 2014 -normal MRI  MRI 2016: Small microadenoma  MRI 2019: No adenoma      6.   Diabetic retinopathy: Followed by ophthalmology        Alvaro Garcia MD      Patient verbalized understanding

## 2020-11-05 NOTE — PROGRESS NOTES
Sabra Azevedo is a 54 y.o. male here for   Chief Complaint   Patient presents with    Diabetes    Pituitary Problem    Hypogonadism       1. Have you been to the ER, urgent care clinic since your last visit? Hospitalized since your last visit? -no    2. Have you seen or consulted any other health care providers outside of the [address] [place] [address] since your last visit?   Include any pap smears or colon screening.-no

## 2020-11-05 NOTE — PATIENT INSTRUCTIONS
Humalog or Novolog 40 - 50 units before meals     Blood sugar  Fast acting insulin          150-200  Extra 3 Units       201-250  Extra 6 Units       251-300  Extra 9 Units       301-350  Extra 12  Units        351-400  Extra 15  Units

## 2020-11-05 NOTE — LETTER
11/7/20 Patient: Marcia Trejo YOB: 1975 Date of Visit: 11/5/2020 Dayo Albright NP 
[address] [address] [place] [address] VIA In Basket Dear Dayo Albright NP, Thank you for referring Mr. Ariel Coe to [address] [address] for evaluation. My notes for this consultation are attached. If you have questions, please do not hesitate to call me. I look forward to following your patient along with you. Sincerely, Dilia West MD

## 2021-02-02 DIAGNOSIS — Z79.4 TYPE 2 DIABETES MELLITUS WITH HYPERGLYCEMIA, WITH LONG-TERM CURRENT USE OF INSULIN (HCC): ICD-10-CM

## 2021-02-02 DIAGNOSIS — E22.1 HYPERPROLACTINEMIA (HCC): ICD-10-CM

## 2021-02-02 DIAGNOSIS — Z99.89 OSA ON CPAP: ICD-10-CM

## 2021-02-02 DIAGNOSIS — G47.33 OSA ON CPAP: ICD-10-CM

## 2021-02-02 DIAGNOSIS — D35.2 PITUITARY MACROADENOMA (HCC): ICD-10-CM

## 2021-02-02 DIAGNOSIS — E11.65 TYPE 2 DIABETES MELLITUS WITH HYPERGLYCEMIA, WITH LONG-TERM CURRENT USE OF INSULIN (HCC): ICD-10-CM

## 2021-02-02 DIAGNOSIS — E29.1 HYPOGONADISM MALE: ICD-10-CM

## 2021-02-02 RX ORDER — TESTOSTERONE CYPIONATE 200 MG/ML
INJECTION INTRAMUSCULAR
Qty: 6 ML | Refills: 4 | Status: SHIPPED | OUTPATIENT
Start: 2021-02-02 | End: 2021-11-02

## 2021-02-09 DIAGNOSIS — E11.65 TYPE 2 DIABETES MELLITUS WITH HYPERGLYCEMIA, WITH LONG-TERM CURRENT USE OF INSULIN (HCC): Primary | ICD-10-CM

## 2021-02-09 DIAGNOSIS — Z79.4 TYPE 2 DIABETES MELLITUS WITH HYPERGLYCEMIA, WITH LONG-TERM CURRENT USE OF INSULIN (HCC): Primary | ICD-10-CM

## 2021-02-09 RX ORDER — BLOOD-GLUCOSE METER
EACH MISCELLANEOUS
Qty: 1 EACH | Refills: 0 | Status: SHIPPED | OUTPATIENT
Start: 2021-02-09 | End: 2021-06-19

## 2021-03-08 DIAGNOSIS — E11.65 TYPE 2 DIABETES MELLITUS WITH HYPERGLYCEMIA, WITH LONG-TERM CURRENT USE OF INSULIN (HCC): ICD-10-CM

## 2021-03-08 DIAGNOSIS — E29.1 HYPOGONADISM MALE: ICD-10-CM

## 2021-03-08 DIAGNOSIS — G47.33 OSA ON CPAP: ICD-10-CM

## 2021-03-08 DIAGNOSIS — Z99.89 OSA ON CPAP: ICD-10-CM

## 2021-03-08 DIAGNOSIS — Z79.4 TYPE 2 DIABETES MELLITUS WITH HYPERGLYCEMIA, WITH LONG-TERM CURRENT USE OF INSULIN (HCC): ICD-10-CM

## 2021-03-08 DIAGNOSIS — E22.1 HYPERPROLACTINEMIA (HCC): ICD-10-CM

## 2021-03-08 RX ORDER — PEN NEEDLE, DIABETIC 30 GX3/16"
NEEDLE, DISPOSABLE MISCELLANEOUS
Qty: 200 PEN NEEDLE | Refills: 11 | Status: SHIPPED | OUTPATIENT
Start: 2021-03-08 | End: 2021-06-19

## 2021-03-16 ENCOUNTER — OFFICE VISIT (OUTPATIENT)
Dept: ENDOCRINOLOGY | Age: 46
End: 2021-03-16
Payer: COMMERCIAL

## 2021-03-16 VITALS
BODY MASS INDEX: 46.65 KG/M2 | WEIGHT: 315 LBS | DIASTOLIC BLOOD PRESSURE: 67 MMHG | HEART RATE: 79 BPM | SYSTOLIC BLOOD PRESSURE: 125 MMHG | RESPIRATION RATE: 18 BRPM | OXYGEN SATURATION: 96 % | HEIGHT: 69 IN | TEMPERATURE: 98.1 F

## 2021-03-16 DIAGNOSIS — E78.2 MIXED HYPERLIPIDEMIA: ICD-10-CM

## 2021-03-16 DIAGNOSIS — Z79.4 TYPE 2 DIABETES MELLITUS WITH HYPERGLYCEMIA, WITH LONG-TERM CURRENT USE OF INSULIN (HCC): Primary | ICD-10-CM

## 2021-03-16 DIAGNOSIS — D35.2 PITUITARY MACROADENOMA (HCC): ICD-10-CM

## 2021-03-16 DIAGNOSIS — E11.65 TYPE 2 DIABETES MELLITUS WITH HYPERGLYCEMIA, WITH LONG-TERM CURRENT USE OF INSULIN (HCC): Primary | ICD-10-CM

## 2021-03-16 DIAGNOSIS — I10 ESSENTIAL HYPERTENSION: ICD-10-CM

## 2021-03-16 DIAGNOSIS — E22.1 HYPERPROLACTINEMIA (HCC): ICD-10-CM

## 2021-03-16 DIAGNOSIS — E29.1 HYPOGONADISM MALE: ICD-10-CM

## 2021-03-16 LAB — HBA1C MFR BLD HPLC: 11.9 %

## 2021-03-16 PROCEDURE — 99215 OFFICE O/P EST HI 40 MIN: CPT | Performed by: INTERNAL MEDICINE

## 2021-03-16 PROCEDURE — 83036 HEMOGLOBIN GLYCOSYLATED A1C: CPT | Performed by: INTERNAL MEDICINE

## 2021-03-16 RX ORDER — PEN NEEDLE, DIABETIC 31 GX3/16"
NEEDLE, DISPOSABLE MISCELLANEOUS
Qty: 200 PEN NEEDLE | Refills: 11 | Status: SHIPPED | OUTPATIENT
Start: 2021-03-16 | End: 2021-06-19

## 2021-03-16 NOTE — PATIENT INSTRUCTIONS
Humalog or Novolog 40 - 50 units before meals or carb ratio 3     Additional Humalog for high sugars     Blood sugar  Fast acting insulin          150-200  Extra 5 Units       201-250  Extra 10Units       251-300  Extra 15Units       301-350  Extra 20  Units        351-400  Extra 25 Units

## 2021-03-16 NOTE — PROGRESS NOTES
Apoorva Reina is a 54 y.o. male here for   Chief Complaint   Patient presents with    Diabetes    Hypogonadism    Hyperprolactinemia       1. Have you been to the ER, urgent care clinic since your last visit? Hospitalized since your last visit? -no    2. Have you seen or consulted any other health care providers outside of the [address] [place] [address] since your last visit?   Include any pap smears or colon screening.-no    Has had both COVID vaccines

## 2021-03-16 NOTE — LETTER
3/17/2021 Patient: Kimberlyn Reyez YOB: 1975 Date of Visit: 3/16/2021 Golden Camacho NP 
[address] [address] [place] [address] [address] [employer] Dear Golden Camacho NP, Thank you for referring Mr. Triston Hunt to [id number] [place] [address] for evaluation. My notes for this consultation are attached. If you have questions, please do not hesitate to call me. I look forward to following your patient along with you. Sincerely, Davin Gonzalez MD

## 2021-03-16 NOTE — PROGRESS NOTES
[facility]               MD Ignacio Loredo  1975    Chief complaint: Diabetes mellitus, hypogonadism follow-up      History of Present Illness: Dong Lazaro is a 44 y.o. male here for follow-up  Type 2 diabetes mellitus: On MDI  He is on MDI, had nausea with Ozempic, he is titrating Ozempic slowly up  Taking medications consistently    Has DEXCOM-downloaded Dexcom clarity  No severe headache  Humalog U-200    TROY - on Bipap    Type 2 Diabetes was diagnosed 6yrs ago . 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 prolactin    MRI 2016 Sep        Wt Readings from Last 3 Encounters:   03/16/21 340 lb (154.2 kg)   11/05/20 348 lb (157.9 kg)   07/21/20 347 lb (157.4 kg)       BP Readings from Last 3 Encounters:   03/16/21 125/67   11/05/20 (!) 151/86   07/21/20 148/75           Past Medical History:   Diagnosis Date    Anxiety     Arthritis     Diabetic neuropathy ([address]     DM (diabetes mellitus) ([address]     GERD (gastroesophageal reflux disease)     HTN (hypertension)     Hyperprolactinemia ([address]     Hypogonadotropic hypogonadism ([address]     TROY on CPAP     Pituitary macroadenoma ([address] December 2014     Current Outpatient Medications   Medication Sig    Insulin Needles, Disposable, 31 gauge x 5/16\" ndle Use to inject insulin 5x daily Dx Code: E11.65    Blood-Glucose Meter (OneTouch Verio Reflect Meter) misc Test TID Dx Code: E11.65    DULoxetine (CYMBALTA) 60 mg capsule Take 1 Cap by mouth daily. VISIT REQUIRED PRIOR TO ADDITIONAL REFILLS    testosterone cypionate (DEPOTESTOTERONE CYPIONATE) 200 mg/mL injection INJECT 1.25 ML INTO THE MUSCLE ONCE EVERY 2 WEEKS    insulin lispro (HumaLOG KwikPen Insulin) 200 unit/mL (3 mL) inpn Inject 40 - 50 units before meals w/SSI Max units daily: 185    losartan-hydroCHLOROthiazide (HYZAAR) 100-25 mg per tablet Take 1 Tab by mouth daily.  Stop Prinzide    emtricitabine-tenofovir, TDF, (Truvada) 200-300 mg per tablet Take 1 Tab by mouth daily.  tadalafiL (CIALIS) 20 mg tablet TAKE ONE TABLET BY MOUTH ONCE EVERY 5 DAYS AS NEEDED    insulin glargine U-300 conc (Toujeo SoloStar U-300 Insulin) 300 unit/mL (1.5 mL) inpn pen 75 Units by SubCUTAneous route two (2) times a day.  semaglutide (Ozempic) 1 mg/dose (2 mg/1.5 mL) sub-q pen 1 mg SC weekly    Blood-Glucose Meter (OneTouch Verio Flex) misc Test TID Dx Code: E11.65    Syringe, Disposable, 3 mL syrg Once Q 2 weeks    Needle, Disp, 22 G 22 gauge x 1 1/2\" ndle Q 2 weeks    glucose blood VI test strips (OneTouch Verio) strip Test TID Dx Code: E11.65    metFORMIN ER (GLUCOPHAGE XR) 500 mg tablet Take 2 Tabs by mouth two (2) times a day.  cabergoline (DOSTINEX) 0.5 mg tablet Take 0.5 Tabs by mouth two (2) times a week.  pravastatin (PRAVACHOL) 20 mg tablet Take 1 Tab by mouth nightly.  lancets (ONE TOUCH DELICA) 33 gauge misc Test TID Dx Code: E11.65    Insulin Needles, Disposable, 32 gauge x 5/32\" ndle Use to inject insulin 5x daily Dx Code: E11.65    fluticasone propionate (FLONASE) 50 mcg/actuation nasal spray 2 Sprays by Both Nostrils route daily. (Patient taking differently: 2 Sprays by Both Nostrils route as needed.)     No current facility-administered medications for this visit. Allergies   Allergen Reactions    Amoxicillin Hives    Erythromycin Other (comments)     Stomach cramps    Penicillins Hives    Sulfa (Sulfonamide Antibiotics) Hives         Review of Systems:  - Per HPI  -     Physical Examination:   Blood pressure 125/67, pulse 79, temperature 98.1 °F (36.7 °C), temperature source Oral, resp. rate 18, height 5' 9\" (1.753 m), weight 340 lb (154.2 kg), SpO2 96 %. Estimated body mass index is 50.21 kg/m² as calculated from the following:    Height as of this encounter: 5' 9\" (1.753 m). -   Weight as of this encounter: 340 lb (154.2 kg).     General: pleasant, no distress, good eye contact  HEENT: no exophthalmos, no periorbital edema, EOMI  Neck: No visible thyromegaly  RS: Normal respiratory effort  Musculoskeletal: no tremors  Neurological: alert and oriented  Psychiatric: normal mood and affect  Skin: Normal color        Data Reviewed:       Lab Results   Component Value Date/Time    WBC 10.1 09/11/2020 12:48 PM    HGB 14.6 09/11/2020 12:48 PM    HCT 44.1 09/11/2020 12:48 PM    PLATELET 656 84/36/4163 12:48 PM    MCV 85 09/11/2020 12:48 PM     Lab Results   Component Value Date/Time    Hemoglobin A1c 9.6 (H) 10/26/2020 04:03 PM    Hemoglobin A1c 7.0 (H) 02/25/2020 03:39 PM    Hemoglobin A1c 11.2 (H) 02/13/2018 02:00 PM    Microalbumin/Creat ratio (mg/g creat) 25 02/25/2020 03:39 PM    Microalbumin,urine random 4.96 02/25/2020 03:39 PM    LDL,Direct 150 (H) 10/26/2020 04:03 PM    LDL, calculated 110 (H) 02/13/2018 02:00 PM    Creatinine 0.87 10/26/2020 04:03 PM      Lab Results   Component Value Date/Time    Cholesterol, total 169 02/13/2018 02:00 PM    HDL Cholesterol 29 (L) 02/13/2018 02:00 PM    LDL,Direct 150 (H) 10/26/2020 04:03 PM    LDL, calculated 110 (H) 02/13/2018 02:00 PM    Triglyceride 149 02/13/2018 02:00 PM     Lab Results   Component Value Date/Time    Alk.  phosphatase 59 02/25/2020 03:39 PM     Lab Results   Component Value Date/Time    GFR est AA >60 10/26/2020 04:03 PM    GFR est non-AA >60 10/26/2020 04:03 PM    Creatinine 0.87 10/26/2020 04:03 PM    BUN 12 10/26/2020 04:03 PM    Sodium 135 (L) 10/26/2020 04:03 PM    Potassium 4.4 10/26/2020 04:03 PM    Chloride 97 10/26/2020 04:03 PM    CO2 29 10/26/2020 04:03 PM      Lab Results   Component Value Date/Time    Prostate Specific Ag 0.6 07/23/2019 02:06 PM    Prostate Specific Ag 0.2 02/19/2018 03:02 PM    Prostate Specific Ag 0.9 11/18/2014 09:57 AM     Lab Results   Component Value Date/Time    TSH 1.280 09/11/2020 12:48 PM    T4, Free 1.38 01/02/2014 08:42 AM      Component      Latest Ref Rng 8/8/2013   TESTOSTERONE, TOTAL, LC/MS      348.0 - 1197.0 ng/dL 15.4 (L)   TESTOSTERONE, FREE      5.00 - 21.00 ng/dL 0.67 (L)   % Free testosterone      1.50 - 4.20 % 4.36 (H)   Luteinizing hormone      1.7 - 8.6 mIU/mL 0.5 (L)   FSH      1.5 - 12.4 mIU/mL 0.8 (L)   Prolactin      4.0 - 15.2 ng/mL 130.7 (H)      Lab Results   Component Value Date/Time    Testosterone 206 (L) 07/10/2014 03:42 PM     Lab Results   Component Value Date/Time    FSH 0.8 (L) 08/08/2013 11:35 AM    Luteinizing hormone 0.5 (L) 08/08/2013 11:35 AM    Prolactin 9.5 10/26/2020 04:03 PM           Lab Results   Component Value Date/Time    Hemoglobin A1c 9.6 (H) 10/26/2020 04:03 PM    Hemoglobin A1c 7.0 (H) 02/25/2020 03:39 PM    Hemoglobin A1c 11.2 (H) 02/13/2018 02:00 PM    Microalbumin/Creat ratio (mg/g creat) 25 02/25/2020 03:39 PM    Microalbumin,urine random 4.96 02/25/2020 03:39 PM    LDL,Direct 150 (H) 10/26/2020 04:03 PM    LDL, calculated 110 (H) 02/13/2018 02:00 PM    Creatinine 0.87 10/26/2020 04:03 PM      MRI 2014: normal      Assessment/Plan:     1. Type 2 Diabetes Mellitus with retinopathy  Lab Results   Component Value Date/Time    Hemoglobin A1c 9.6 (H) 10/26/2020 04:03 PM    Hemoglobin A1c (POC) 11.9 03/16/2021 03:49 PM   high risk for hypoglycemia due to high dose of insulin   Poorly controlled diabetes mellitus, last 2 weeks blood glucose has improved  Metformin XR  Insulin glargine insulin 75 units BID  Prandial insulin 40 - 50 units: Carb ratio 3  Downloaded Dexcom clarity and reviewed with the patient, post lunch and post dinner hyperglycemia   Ozempic  Was on [address] weight loss programs discussed in the past   FLU annually ,Pneumovax ,aspirin daily,annual eye exam,microalbumin. High risk for complications, symptomatic hyperglycemia    2. Hyperlipidemia: Low-cholesterol diet  Statin    3. HTN :uncontrolled     4. TROY- on CPAP    5.  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 Macroadenoma 1.6 cm -prolactin secreting  MRI pituitary in August 2014 -normal MRI  MRI 2016: Small microadenoma  MRI 2019: No adenoma      6.   Diabetic retinopathy: Followed by ophthalmology      Spent > 40 minutes on the day of the visit reviewing chart, examining, ordering/reviewing labs, counseling, discussing therapeutics and documentation in the medical record  Odalis Grewal MD      Patient verbalized understanding

## 2021-04-12 ENCOUNTER — OFFICE VISIT (OUTPATIENT)
Dept: FAMILY MEDICINE CLINIC | Age: 46
End: 2021-04-12
Payer: COMMERCIAL

## 2021-04-12 VITALS
SYSTOLIC BLOOD PRESSURE: 157 MMHG | OXYGEN SATURATION: 96 % | TEMPERATURE: 98 F | HEIGHT: 69 IN | HEART RATE: 77 BPM | BODY MASS INDEX: 46.65 KG/M2 | WEIGHT: 315 LBS | DIASTOLIC BLOOD PRESSURE: 84 MMHG

## 2021-04-12 DIAGNOSIS — E78.00 HYPERCHOLESTEREMIA: ICD-10-CM

## 2021-04-12 DIAGNOSIS — I10 ESSENTIAL HYPERTENSION: ICD-10-CM

## 2021-04-12 DIAGNOSIS — Z79.4 TYPE 2 DIABETES MELLITUS WITH RETINOPATHY, WITH LONG-TERM CURRENT USE OF INSULIN, MACULAR EDEMA PRESENCE UNSPECIFIED, UNSPECIFIED LATERALITY, UNSPECIFIED RETINOPATHY SEVERITY (HCC): Primary | ICD-10-CM

## 2021-04-12 DIAGNOSIS — R06.02 SHORTNESS OF BREATH: ICD-10-CM

## 2021-04-12 DIAGNOSIS — E11.319 TYPE 2 DIABETES MELLITUS WITH RETINOPATHY, WITH LONG-TERM CURRENT USE OF INSULIN, MACULAR EDEMA PRESENCE UNSPECIFIED, UNSPECIFIED LATERALITY, UNSPECIFIED RETINOPATHY SEVERITY (HCC): Primary | ICD-10-CM

## 2021-04-12 DIAGNOSIS — R60.9 EDEMA, UNSPECIFIED TYPE: ICD-10-CM

## 2021-04-12 PROCEDURE — 99214 OFFICE O/P EST MOD 30 MIN: CPT | Performed by: NURSE PRACTITIONER

## 2021-04-12 RX ORDER — BUMETANIDE 1 MG/1
1 TABLET ORAL DAILY
Qty: 30 TAB | Refills: 5 | Status: SHIPPED | OUTPATIENT
Start: 2021-04-12 | End: 2021-04-28 | Stop reason: SDUPTHER

## 2021-04-12 NOTE — PROGRESS NOTES
HISTORY OF PRESENT ILLNESS  Makenzie Ceron is a 54 y.o. male. HPI  Cardiovascular Review:  The patient has hypertension, hyperlipidemia and coronary artery disease. Diet and Lifestyle: generally follows a low fat low cholesterol diet, generally follows a low sodium diet, sedentary, nonsmoker, he notes a 17 pound weight gain the last month. no pmh of CHF that he knows of. does have significan sob with exertion. does not have a cardiologist.  Home BP Monitoring: is not measured at home. Pertinent ROS: taking medications as instructed, no medication side effects noted, no TIA's, no chest pain on exertion. Diabetes Mellitus:  He has diabetes mellitus, and  hyperlipidemia. Diabetic ROS - medication compliance: compliant all of the time, diabetic diet compliance: compliant most of the time, home glucose monitoring: is not performed. Lab review: orders written for new lab studies as appropriate; see orders. Patient Active Problem List    Diagnosis Date Noted    Type 2 diabetes mellitus with hyperglycemia, with long-term current use of insulin ([facility] 10/29/2020    Moderate episode of recurrent major depressive disorder ([facility] 03/11/2020    SHIMON (generalized anxiety disorder) 03/11/2020    Obesity, morbid ([facility] 02/19/2018    Pituitary macroadenoma ([facility] 02/15/2014    Other and unspecified anterior pituitary hyperfunction 01/09/2014    Hyperprolactinemia ([facility] 11/09/2013    Hyperlipidemia 11/09/2013    Type 2 diabetes mellitus with retinopathy, without long-term current use of insulin ([facility] 08/07/2013    HTN (hypertension) 08/07/2013    TROY on CPAP 08/07/2013    Hypogonadism male 08/07/2013     Current Outpatient Medications   Medication Sig Dispense Refill    bumetanide (BUMEX) 1 mg tablet Take 1 Tab by mouth daily. 30 Tab 5    Blood-Glucose Meter (OneTouch Verio Reflect Meter) misc Test TID Dx Code: E11.65 1 Each 0    DULoxetine (CYMBALTA) 60 mg capsule Take 1 Cap by mouth daily.  VISIT REQUIRED PRIOR TO ADDITIONAL REFILLS 30 Cap 0    testosterone cypionate (DEPOTESTOTERONE CYPIONATE) 200 mg/mL injection INJECT 1.25 ML INTO THE MUSCLE ONCE EVERY 2 WEEKS 6 mL 4    insulin lispro (HumaLOG KwikPen Insulin) 200 unit/mL (3 mL) inpn Inject 40 - 50 units before meals w/SSI Max units daily: 185 30 mL 11    losartan-hydroCHLOROthiazide (HYZAAR) 100-25 mg per tablet Take 1 Tab by mouth daily. Stop Prinzide 90 Tab 3    tadalafiL (CIALIS) 20 mg tablet TAKE ONE TABLET BY MOUTH ONCE EVERY 5 DAYS AS NEEDED 6 Tab 6    insulin glargine U-300 conc (Toujeo SoloStar U-300 Insulin) 300 unit/mL (1.5 mL) inpn pen 75 Units by SubCUTAneous route two (2) times a day. 90 mL 3    semaglutide (Ozempic) 1 mg/dose (2 mg/1.5 mL) sub-q pen 1 mg SC weekly 1 Box 3    Blood-Glucose Meter (OneTouch Verio Flex) misc Test TID Dx Code: E11.65 1 Each 0    glucose blood VI test strips (OneTouch Verio) strip Test TID Dx Code: E11.65 100 Strip 11    metFORMIN ER (GLUCOPHAGE XR) 500 mg tablet Take 2 Tabs by mouth two (2) times a day. 360 Tab 5    fluticasone propionate (FLONASE) 50 mcg/actuation nasal spray 2 Sprays by Both Nostrils route daily. (Patient taking differently: 2 Sprays by Both Nostrils route as needed.) 1 Bottle 5    cabergoline (DOSTINEX) 0.5 mg tablet Take 0.5 Tabs by mouth two (2) times a week. 4 Tab 5    pravastatin (PRAVACHOL) 20 mg tablet Take 1 Tab by mouth nightly.  30 Tab 5    Insulin Needles, Disposable, 32 gauge x 5/32\" ndle Use to inject insulin 5x daily Dx Code: E11.65 200 Pen Needle 11    Insulin Needles, Disposable, 31 gauge x 5/16\" ndle Use to inject insulin 5x daily Dx Code: E11.65 200 Pen Needle 11    Syringe, Disposable, 3 mL syrg Once Q 2 weeks 10 Syringe 3    Needle, Disp, 22 G 22 gauge x 1 1/2\" ndle Q 2 weeks 10 Each 4    lancets (ONE TOUCH DELICA) 33 gauge misc Test TID Dx Code: E11.65 100 Lancet 11     Family History   Problem Relation Age of Onset    Hypertension Mother     Diabetes Father    Jah Chan Hypertension Brother     Diabetes Maternal Grandfather     Diabetes Paternal Grandfather      Social History     Tobacco Use    Smoking status: Never Smoker    Smokeless tobacco: Never Used   Substance Use Topics    Alcohol use: Yes           ROS  A comprehensive review of system was obtained and negative except findings in the HPI    Visit Vitals  BP (!) 157/84 (BP 1 Location: Right arm, BP Patient Position: Sitting)   Pulse 77   Temp 98 °F (36.7 °C) (Oral)   Ht 5' 9\" (1.753 m)   Wt (!) 359 lb (162.8 kg)   SpO2 96%   BMI 53.02 kg/m²     Physical Exam  Vitals signs and nursing note reviewed. Cardiovascular:      Rate and Rhythm: Normal rate. Heart sounds: Murmur present. Pulmonary:      Breath sounds: Normal breath sounds. Musculoskeletal:         General: Swelling present. ASSESSMENT and PLAN  Encounter Diagnoses   Name Primary?  Type 2 diabetes mellitus with retinopathy, with long-term current use of insulin, macular edema presence unspecified, unspecified laterality, unspecified retinopathy severity (HCC) Yes    Edema, unspecified type     Essential hypertension     Hypercholesteremia     Shortness of breath      Orders Placed This Encounter    LIPID PANEL    CBC WITH AUTOMATED DIFF    METABOLIC PANEL, COMPREHENSIVE    NT-PRO BNP    REFERRAL TO CARDIOLOGY    bumetanide (BUMEX) 1 mg tablet     Likely in acute CHF - added bumex to his regimen 1mg a day  Must cont to do daily weights  Will message with weights on Thursday, if cont to go up must go to ER  Referral to cardio as well, BNP drawn today, needs updated echo    I have discussed the diagnosis with the patient and the intended plan as seen in the above orders. The patient has received an after-visit summary and questions were answered concerning future plans. Patient conveyed understanding of the plan at the time of the visit.     Orin Piper, MSN, ANP  4/12/2021

## 2021-04-12 NOTE — LETTER
4/12/2021 10:49 AM 
 
Mr. De La Fuente Alvarez [address] [facility] Please excuse patient from work 4/12 - 4/13/21 due to illness.  
 
 
 
Sincerely, 
 
 
Cassidy Patel NP

## 2021-04-12 NOTE — PROGRESS NOTES
Chief Complaint   Patient presents with    Swelling     Pt being seen today for swelling  -pt reports he has gained 17lbs  -pt states that he has concerns about his stomach being more tight then normal    1. Have you been to the ER, urgent care clinic since your last visit? Hospitalized since your last visit? No    2. Have you seen or consulted any other health care providers outside of the [address] [place] [address] since your last visit? Include any pap smears or colon screening.  No     Pt has no other concerns

## 2021-04-14 LAB
ALBUMIN SERPL-MCNC: 4.2 G/DL (ref 4–5)
ALBUMIN/GLOB SERPL: 1.3 {RATIO} (ref 1.2–2.2)
ALP SERPL-CCNC: 70 IU/L (ref 39–117)
ALT SERPL-CCNC: 31 IU/L (ref 0–44)
AST SERPL-CCNC: 17 IU/L (ref 0–40)
BASOPHILS # BLD AUTO: 0.1 X10E3/UL (ref 0–0.2)
BASOPHILS NFR BLD AUTO: 1 %
BILIRUB SERPL-MCNC: 0.4 MG/DL (ref 0–1.2)
BUN SERPL-MCNC: 12 MG/DL (ref 6–24)
BUN/CREAT SERPL: 15 (ref 9–20)
CALCIUM SERPL-MCNC: 9.1 MG/DL (ref 8.7–10.2)
CHLORIDE SERPL-SCNC: 97 MMOL/L (ref 96–106)
CHOLEST SERPL-MCNC: 190 MG/DL (ref 100–199)
CO2 SERPL-SCNC: 25 MMOL/L (ref 20–29)
CREAT SERPL-MCNC: 0.8 MG/DL (ref 0.76–1.27)
EOSINOPHIL # BLD AUTO: 0.3 X10E3/UL (ref 0–0.4)
EOSINOPHIL NFR BLD AUTO: 4 %
ERYTHROCYTE [DISTWIDTH] IN BLOOD BY AUTOMATED COUNT: 14.5 % (ref 11.6–15.4)
GLOBULIN SER CALC-MCNC: 3.2 G/DL (ref 1.5–4.5)
GLUCOSE SERPL-MCNC: 259 MG/DL (ref 65–99)
HCT VFR BLD AUTO: 39.8 % (ref 37.5–51)
HDLC SERPL-MCNC: 35 MG/DL
HGB BLD-MCNC: 13.1 G/DL (ref 13–17.7)
IMM GRANULOCYTES # BLD AUTO: 0 X10E3/UL (ref 0–0.1)
IMM GRANULOCYTES NFR BLD AUTO: 0 %
IMP & REVIEW OF LAB RESULTS: NORMAL
LDLC SERPL CALC-MCNC: 133 MG/DL (ref 0–99)
LYMPHOCYTES # BLD AUTO: 2.2 X10E3/UL (ref 0.7–3.1)
LYMPHOCYTES NFR BLD AUTO: 27 %
MCH RBC QN AUTO: 26.7 PG (ref 26.6–33)
MCHC RBC AUTO-ENTMCNC: 32.9 G/DL (ref 31.5–35.7)
MCV RBC AUTO: 81 FL (ref 79–97)
MONOCYTES # BLD AUTO: 0.6 X10E3/UL (ref 0.1–0.9)
MONOCYTES NFR BLD AUTO: 7 %
NEUTROPHILS # BLD AUTO: 5.2 X10E3/UL (ref 1.4–7)
NEUTROPHILS NFR BLD AUTO: 61 %
NT-PROBNP SERPL-MCNC: 541 PG/ML (ref 0–121)
PLATELET # BLD AUTO: 333 X10E3/UL (ref 150–450)
POTASSIUM SERPL-SCNC: 5.2 MMOL/L (ref 3.5–5.2)
PROT SERPL-MCNC: 7.4 G/DL (ref 6–8.5)
RBC # BLD AUTO: 4.91 X10E6/UL (ref 4.14–5.8)
SODIUM SERPL-SCNC: 137 MMOL/L (ref 134–144)
SPECIMEN STATUS REPORT, ROLRST: NORMAL
TRIGL SERPL-MCNC: 121 MG/DL (ref 0–149)
VLDLC SERPL CALC-MCNC: 22 MG/DL (ref 5–40)
WBC # BLD AUTO: 8.4 X10E3/UL (ref 3.4–10.8)

## 2021-04-17 NOTE — PROGRESS NOTES
Hey there, your labs do confirm that you have heart failure, Dr. Jacqui Browning will address this at your upcoming appointment and make any med changes he feels necessary. Your sugar is quite high. You need to address this with endocrine. This is certainly making the heart failure worse. We need to recheck labs in 3 months fasting.  Ghanshyam Lee

## 2021-04-28 ENCOUNTER — OFFICE VISIT (OUTPATIENT)
Dept: CARDIOLOGY CLINIC | Age: 46
End: 2021-04-28
Payer: COMMERCIAL

## 2021-04-28 VITALS
HEIGHT: 69 IN | DIASTOLIC BLOOD PRESSURE: 88 MMHG | HEART RATE: 81 BPM | WEIGHT: 315 LBS | BODY MASS INDEX: 46.65 KG/M2 | SYSTOLIC BLOOD PRESSURE: 130 MMHG | OXYGEN SATURATION: 97 %

## 2021-04-28 DIAGNOSIS — R06.02 SOB (SHORTNESS OF BREATH): Primary | ICD-10-CM

## 2021-04-28 DIAGNOSIS — I10 ESSENTIAL HYPERTENSION: ICD-10-CM

## 2021-04-28 PROCEDURE — 93000 ELECTROCARDIOGRAM COMPLETE: CPT | Performed by: SPECIALIST

## 2021-04-28 PROCEDURE — 99204 OFFICE O/P NEW MOD 45 MIN: CPT | Performed by: SPECIALIST

## 2021-04-28 RX ORDER — POTASSIUM CHLORIDE 750 MG/1
10 TABLET, EXTENDED RELEASE ORAL DAILY
Qty: 30 TAB | Refills: 6 | Status: SHIPPED | OUTPATIENT
Start: 2021-04-28 | End: 2021-06-19

## 2021-04-28 RX ORDER — BUMETANIDE 1 MG/1
1 TABLET ORAL 2 TIMES DAILY
Qty: 60 TAB | Refills: 6 | Status: SHIPPED | OUTPATIENT
Start: 2021-04-28 | End: 2021-08-18

## 2021-04-28 NOTE — PROGRESS NOTES
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 Beryl Maynard.   Dx: sob, edema, chf

## 2021-04-28 NOTE — PROGRESS NOTES
Charles Quan MD. [facility]              Patient: Jose Pak  : 1975      Today's Date: 2021          HISTORY OF PRESENT ILLNESS:     History of Present Illness:  Referred for edema, SOB    Started having having swelling, SOB  past month. Saw Ms. Nelly Murphy and started on a diuretic. He lost 6-7 lbs on a diuretic to start. He is having some GI issues with diabetic meds. Edema is better but still there. He denies a known history of heart disease. Denies CP. PAST MEDICAL HISTORY:     Past Medical History:   Diagnosis Date    Anxiety     Arthritis     Diabetic neuropathy ([address]     DM (diabetes mellitus) ([address]     GERD (gastroesophageal reflux disease)     HTN (hypertension)     Hyperprolactinemia ([address]     Hypogonadotropic hypogonadism ([address]     Obesity, morbid ([address]     TROY on CPAP     Pituitary macroadenoma ([facility] 2014         Past Surgical History:   Procedure Laterality Date    HX COLONOSCOPY      HX WISDOM TEETH EXTRACTION             MEDICATIONS:     Current Outpatient Medications   Medication Sig Dispense Refill    bumetanide (BUMEX) 1 mg tablet Take 1 Tab by mouth daily. 30 Tab 5    Insulin Needles, Disposable, 32 gauge x 5/32\" ndle Use to inject insulin 5x daily Dx Code: E11.65 200 Pen Needle 11    Insulin Needles, Disposable, 31 gauge x 5/16\" ndle Use to inject insulin 5x daily Dx Code: E11.65 200 Pen Needle 11    Blood-Glucose Meter (OneTouch Verio Reflect Meter) misc Test TID Dx Code: E11.65 1 Each 0    DULoxetine (CYMBALTA) 60 mg capsule Take 1 Cap by mouth daily.  VISIT REQUIRED PRIOR TO ADDITIONAL REFILLS 30 Cap 0    testosterone cypionate (DEPOTESTOTERONE CYPIONATE) 200 mg/mL injection INJECT 1.25 ML INTO THE MUSCLE ONCE EVERY 2 WEEKS 6 mL 4    insulin lispro (HumaLOG KwikPen Insulin) 200 unit/mL (3 mL) inpn Inject 40 - 50 units before meals w/SSI Max units daily: 185 30 mL 11    losartan-hydroCHLOROthiazide (HYZAAR) 100-25 mg per tablet Take 1 Tab by mouth daily. Stop Prinzide 90 Tab 3    tadalafiL (CIALIS) 20 mg tablet TAKE ONE TABLET BY MOUTH ONCE EVERY 5 DAYS AS NEEDED 6 Tab 6    insulin glargine U-300 conc (Toujeo SoloStar U-300 Insulin) 300 unit/mL (1.5 mL) inpn pen 75 Units by SubCUTAneous route two (2) times a day. 90 mL 3    semaglutide (Ozempic) 1 mg/dose (2 mg/1.5 mL) sub-q pen 1 mg SC weekly (Patient taking differently: 1 mg SC weekly. Holding d/t GI issues (since about 4/14/21). ) 1 Box 3    Blood-Glucose Meter (OneTouch Verio Flex) misc Test TID Dx Code: E11.65 1 Each 0    Syringe, Disposable, 3 mL syrg Once Q 2 weeks 10 Syringe 3    Needle, Disp, 22 G 22 gauge x 1 1/2\" ndle Q 2 weeks 10 Each 4    glucose blood VI test strips (OneTouch Verio) strip Test TID Dx Code: E11.65 100 Strip 11    metFORMIN ER (GLUCOPHAGE XR) 500 mg tablet Take 2 Tabs by mouth two (2) times a day. (Patient taking differently: Take 1,000 mg by mouth two (2) times a day. Holding d/t GI issues (since about 4/14/21). ) 360 Tab 5    fluticasone propionate (FLONASE) 50 mcg/actuation nasal spray 2 Sprays by Both Nostrils route daily. (Patient taking differently: 2 Sprays by Both Nostrils route as needed.) 1 Bottle 5    cabergoline (DOSTINEX) 0.5 mg tablet Take 0.5 Tabs by mouth two (2) times a week. 4 Tab 5    pravastatin (PRAVACHOL) 20 mg tablet Take 1 Tab by mouth nightly.  30 Tab 5    lancets (ONE TOUCH DELICA) 33 gauge misc Test TID Dx Code: E11.65 100 Lancet 11       Allergies   Allergen Reactions    Amoxicillin Hives    Erythromycin Other (comments)     Stomach cramps    Penicillins Hives    Sulfa (Sulfonamide Antibiotics) Hives             SOCIAL HISTORY:     Social History     Tobacco Use    Smoking status: Never Smoker    Smokeless tobacco: Never Used   Substance Use Topics    Alcohol use: Yes     Frequency: Monthly or less    Drug use: No         FAMILY HISTORY:     Family History   Problem Relation Age of Onset    Hypertension Mother    [facility] Diabetes Father     Hypertension Brother     Diabetes Maternal Grandfather     Diabetes Paternal Grandfather              REVIEW OF SYMPTOMS:     Review of Symptoms:  Constitutional: Negative for fever, chills  HEENT: Negative for nosebleeds, tinnitus, and vision changes. Respiratory: Negative for cough, wheezing  Cardiovascular: Negative for orthopnea (sleeps on his stomach) claudication, syncope, and PND. Gastrointestinal: Negative for abdominal pain, diarrhea, melena. Genitourinary: Negative for dysuria  Musculoskeletal: Negative for myalgias. Skin: Negative for rash  Heme: No problems bleeding. Neurological: Negative for speech change and focal weakness. PHYSICAL EXAM:     Physical Exam:  Visit Vitals  /88 (BP 1 Location: Right upper arm, BP Patient Position: Sitting, BP Cuff Size: Large adult)   Pulse 81   Ht 5' 9\" (1.753 m)   Wt (!) 366 lb (166 kg)   SpO2 97%   BMI 54.05 kg/m²     Patient appears generally well, mood and affect are appropriate and pleasant. HEENT:  Hearing intact, non-icteric, normocephalic, atraumatic. Neck Exam: Supple, No carotid bruits. Hard to assess JVD. Lung Exam: Clear to auscultation, even breath sounds. Cardiac Exam: Regular rate and rhythm with no murmur or rub  Abdomen: Soft, non-tender, normal bowel sounds. Obese  Extremities: Moves all ext well. 1+ bilat lower extremity edema to knees.   MSKTL: Overall good ROM ext  Skin: No significant rashes  Psych: Appropriate affect  Neuro - Grossly intact      LABS / OTHER STUDIES reviewed:     Lab Results   Component Value Date/Time    Sodium 137 04/12/2021 12:00 AM    Potassium 5.2 04/12/2021 12:00 AM    Chloride 97 04/12/2021 12:00 AM    CO2 25 04/12/2021 12:00 AM    Anion gap 9 10/26/2020 04:03 PM    Glucose 259 (H) 04/12/2021 12:00 AM    BUN 12 04/12/2021 12:00 AM    Creatinine 0.80 04/12/2021 12:00 AM    BUN/Creatinine ratio 15 04/12/2021 12:00 AM    GFR est  04/12/2021 12:00 AM    GFR est non-AA 107 04/12/2021 12:00 AM    Calcium 9.1 04/12/2021 12:00 AM    Bilirubin, total 0.4 04/12/2021 12:00 AM    Alk. phosphatase 70 04/12/2021 12:00 AM    Protein, total 7.4 04/12/2021 12:00 AM    Albumin 4.2 04/12/2021 12:00 AM    Globulin 4.0 02/25/2020 03:39 PM    A-G Ratio 1.3 04/12/2021 12:00 AM    ALT (SGPT) 31 04/12/2021 12:00 AM    AST (SGOT) 17 04/12/2021 12:00 AM     Lab Results   Component Value Date/Time    WBC 8.4 04/12/2021 12:00 AM    HGB 13.1 04/12/2021 12:00 AM    HCT 39.8 04/12/2021 12:00 AM    PLATELET 427 81/10/3680 12:00 AM    MCV 81 04/12/2021 12:00 AM       Lab Results   Component Value Date/Time    Cholesterol, total 190 04/12/2021 12:00 AM    HDL Cholesterol 35 (L) 04/12/2021 12:00 AM    LDL,Direct 150 (H) 10/26/2020 04:03 PM    LDL, calculated 133 (H) 04/12/2021 12:00 AM    LDL, calculated 110 (H) 02/13/2018 02:00 PM    VLDL, calculated 22 04/12/2021 12:00 AM    VLDL, calculated 30 02/13/2018 02:00 PM    Triglyceride 121 04/12/2021 12:00 AM       Component      Latest Ref Rng & Units 4/12/2021          12:00 AM   NT pro-BNP      0 - 121 pg/mL 541 (H)     Lab Results   Component Value Date/Time    Hemoglobin A1c 9.6 (H) 10/26/2020 04:03 PM    Hemoglobin A1c (POC) 11.9 03/16/2021 03:49 PM             CARDIAC DIAGNOSTICS:     Cardiac Evaluation Includes:  I reviewed the results below. EKG 4/28/21 - NSR, 1st degree AVB, NSST changes       ASSESSMENT AND PLAN:     Assessment and Plan:    1) SOB / Possible CHF / Venous insufficiency ?   - check an echo (with contrast)   - He has lost a few lbs with daily Bumex. Continue Bumex 1 mg daily ---> but add afternoon dose -- follow labs - Also add low dose Kdur     2) Dyslipidemia  - cont statin - adjust dose later     3) DM   - poorly controlled - A1c 11.9 on 3/21   - per PCP    4( HTN  - cont meds    5) Phone FU and see me in one month     Enjoys electronics, works in IT.        Baron Katie MD, [id number] [facility]. [address] [place] [address], [address] [place] [address] Drive  [place], [address] [place] [address]  Ph: [phone]   Ph [phone]      ADDENDUM   5/12/2021  Echo 5/11/21 - TDS. LVEF 60-65%, Grade 3 diastology, LAE    Will call       ADDENDUM   5/13/2021  I called with echo results and left a VM. See him in 2 weeks.

## 2021-05-11 ENCOUNTER — ANCILLARY PROCEDURE (OUTPATIENT)
Dept: CARDIOLOGY CLINIC | Age: 46
End: 2021-05-11
Payer: COMMERCIAL

## 2021-05-11 VITALS
HEIGHT: 69 IN | WEIGHT: 315 LBS | SYSTOLIC BLOOD PRESSURE: 130 MMHG | BODY MASS INDEX: 46.65 KG/M2 | DIASTOLIC BLOOD PRESSURE: 82 MMHG

## 2021-05-11 DIAGNOSIS — I10 ESSENTIAL HYPERTENSION: ICD-10-CM

## 2021-05-11 DIAGNOSIS — R06.02 SOB (SHORTNESS OF BREATH): ICD-10-CM

## 2021-05-11 PROCEDURE — 93306 TTE W/DOPPLER COMPLETE: CPT | Performed by: SPECIALIST

## 2021-05-12 LAB
ECHO AO ROOT DIAM: 3.42 CM
ECHO LA MAJOR AXIS: 5.54 CM
ECHO LA MINOR AXIS: 2.07 CM
ECHO LV E' LATERAL VELOCITY: 9.58 CM/S
ECHO LV E' SEPTAL VELOCITY: 6.19 CM/S
ECHO LV EDV A2C: 90.35 ML
ECHO LV EDV A4C: 111.64 ML
ECHO LV EDV BP: 100.78 ML (ref 67–155)
ECHO LV EDV INDEX A4C: 41.8 ML/M2
ECHO LV EDV INDEX BP: 37.7 ML/M2
ECHO LV EDV NDEX A2C: 33.8 ML/M2
ECHO LV EJECTION FRACTION A2C: 62 PERCENT
ECHO LV EJECTION FRACTION A4C: 61 PERCENT
ECHO LV EJECTION FRACTION BIPLANE: 61.6 PERCENT (ref 55–100)
ECHO LV ESV A2C: 34.01 ML
ECHO LV ESV A4C: 44.1 ML
ECHO LV ESV BP: 38.7 ML (ref 22–58)
ECHO LV ESV INDEX A2C: 12.7 ML/M2
ECHO LV ESV INDEX A4C: 16.5 ML/M2
ECHO LV ESV INDEX BP: 14.5 ML/M2
ECHO LV INTERNAL DIMENSION DIASTOLIC: 4.68 CM (ref 4.2–5.9)
ECHO LV INTERNAL DIMENSION SYSTOLIC: 3.2 CM
ECHO LV IVSD: 1.27 CM (ref 0.6–1)
ECHO LV MASS 2D: 236.3 G (ref 88–224)
ECHO LV MASS INDEX 2D: 88.5 G/M2 (ref 49–115)
ECHO LV POSTERIOR WALL DIASTOLIC: 1.33 CM (ref 0.6–1)
ECHO LVOT DIAM: 2.35 CM
ECHO MV A VELOCITY: 43.67 CM/S
ECHO MV AREA PHT: 5.71 CM2
ECHO MV E DECELERATION TIME (DT): 132.75 MS
ECHO MV E VELOCITY: 112.2 CM/S
ECHO MV E/A RATIO: 2.57
ECHO MV E/E' LATERAL: 11.71
ECHO MV E/E' RATIO (AVERAGED): 14.92
ECHO MV E/E' SEPTAL: 18.13
ECHO MV PRESSURE HALF TIME (PHT): 38.5 MS

## 2021-06-09 ENCOUNTER — OFFICE VISIT (OUTPATIENT)
Dept: FAMILY MEDICINE CLINIC | Age: 46
End: 2021-06-09
Payer: COMMERCIAL

## 2021-06-09 VITALS
WEIGHT: 315 LBS | HEIGHT: 69 IN | RESPIRATION RATE: 18 BRPM | HEART RATE: 83 BPM | TEMPERATURE: 98.7 F | DIASTOLIC BLOOD PRESSURE: 92 MMHG | OXYGEN SATURATION: 98 % | BODY MASS INDEX: 46.65 KG/M2 | SYSTOLIC BLOOD PRESSURE: 166 MMHG

## 2021-06-09 DIAGNOSIS — I50.32 CHRONIC DIASTOLIC CONGESTIVE HEART FAILURE (HCC): ICD-10-CM

## 2021-06-09 DIAGNOSIS — E11.319 TYPE 2 DIABETES MELLITUS WITH RETINOPATHY, WITH LONG-TERM CURRENT USE OF INSULIN, MACULAR EDEMA PRESENCE UNSPECIFIED, UNSPECIFIED LATERALITY, UNSPECIFIED RETINOPATHY SEVERITY (HCC): Primary | ICD-10-CM

## 2021-06-09 DIAGNOSIS — R19.7 DIARRHEA, UNSPECIFIED TYPE: ICD-10-CM

## 2021-06-09 DIAGNOSIS — Z79.4 TYPE 2 DIABETES MELLITUS WITH RETINOPATHY, WITH LONG-TERM CURRENT USE OF INSULIN, MACULAR EDEMA PRESENCE UNSPECIFIED, UNSPECIFIED LATERALITY, UNSPECIFIED RETINOPATHY SEVERITY (HCC): Primary | ICD-10-CM

## 2021-06-09 DIAGNOSIS — I10 ESSENTIAL HYPERTENSION: ICD-10-CM

## 2021-06-09 DIAGNOSIS — R19.4 CHANGE IN BOWEL HABITS: ICD-10-CM

## 2021-06-09 PROCEDURE — 99214 OFFICE O/P EST MOD 30 MIN: CPT | Performed by: NURSE PRACTITIONER

## 2021-06-09 PROCEDURE — 3052F HG A1C>EQUAL 8.0%<EQUAL 9.0%: CPT | Performed by: NURSE PRACTITIONER

## 2021-06-09 NOTE — PROGRESS NOTES
Titus Garcia is a 54 y.o. male    Chief Complaint   Patient presents with    Irritable Bowel Syndrome       1. Have you been to the ER, urgent care clinic since your last visit? Hospitalized since your last visit? No    2. Have you seen or consulted any other health care providers outside of the [address] [place] [address] since your last visit? Include any pap smears or colon screening.  No

## 2021-06-10 LAB
BUN SERPL-MCNC: 16 MG/DL (ref 6–24)
BUN/CREAT SERPL: 18 (ref 9–20)
CALCIUM SERPL-MCNC: 9.5 MG/DL (ref 8.7–10.2)
CHLORIDE SERPL-SCNC: 95 MMOL/L (ref 96–106)
CO2 SERPL-SCNC: 26 MMOL/L (ref 20–29)
CREAT SERPL-MCNC: 0.91 MG/DL (ref 0.76–1.27)
EST. AVERAGE GLUCOSE BLD GHB EST-MCNC: 206 MG/DL
GLUCOSE SERPL-MCNC: 296 MG/DL (ref 65–99)
HBA1C MFR BLD: 8.8 % (ref 4.8–5.6)
NT-PROBNP SERPL-MCNC: 216 PG/ML (ref 0–121)
POTASSIUM SERPL-SCNC: 4.7 MMOL/L (ref 3.5–5.2)
SODIUM SERPL-SCNC: 134 MMOL/L (ref 134–144)

## 2021-06-19 NOTE — PROGRESS NOTES
Subjective:     Star Larios is a 54 y.o. male seen for follow up of diabetes, htn, hyperlipidemia, and persistent diarrhea. Diabetic Review of Systems - medication compliance: compliant all of the time, diabetic diet compliance: noncompliant some of the time, home glucose monitoring: has continuous glucose sensor, no log today, further diabetic ROS: no polyuria or polydipsia, no chest pain, dyspnea or TIA's, no numbness, tingling or pain in extremities, no unusual visual symptoms, no medication side effects noted, last eye exam approximately 6 months ago, acute symptoms are none. On high doses of insulin. Follow up scheduled with Dr. Isabel Braswell next month. Cardiovascular risk analysis - LDL goal is under 70  diabetic  hypertension  hyperlipidemia  obese  sedentary lifestyle  Compliance with treatment thus far has been fair. Not currently taking a statin  ROS: taking medications as instructed, no medication side effects noted, no TIA's, no chest pain on exertion, no dyspnea on exertion, no swelling of ankles, no orthostatic dizziness or lightheadedness, no orthopnea or paroxysmal nocturnal dyspnea, no palpitations, no intermittent claudication symptoms. Diet and Lifestyle: generally follows a low fat low cholesterol diet, generally follows a low sodium diet, does not rigorously follow a diabetic diet, sedentary, nonsmoker  Home BP Monitoring: is not measured at home    Cardiovascular ROS: taking medications as instructed, no medication side effects noted, no TIA's, no chest pain on exertion, no dyspnea on exertion, no swelling of ankles, no orthostatic dizziness or lightheadedness, no orthopnea or paroxysmal nocturnal dyspnea, no palpitations, no intermittent claudication symptoms. Dx with CHF, diastolic dysfunction about 2 months ago, now seeing Dr. Morgan Dillon. Has not been doing daily weights. Following low sodium diet. Bilateral LE edema slowing improving. Good compliance with hyzaar.     Other symptoms and concerns: reports persistent GI compliants including nausea, abdominal pain and bloating, and diarrhea for the past several months. ozempic and metformin were discontinued but symptoms have not improved. Has had some episodes of fecal incontinence with this and it is causing him to miss time at work. Prior to a few months ago his usual pattern was BM once every 1-2 days. Has not seen GI or had any abdominal imaging or other GI work up. No weight loss.      Patient Active Problem List   Diagnosis Code    Type 2 diabetes mellitus with retinopathy, without long-term current use of insulin ([facility] E11.319    HTN (hypertension) I10    TROY on CPAP G47.33, Z99.89    Hypogonadism male E29.1    Hyperprolactinemia (HCC) E22.1    Hyperlipidemia E78.5    Other and unspecified anterior pituitary hyperfunction E22.9    Pituitary macroadenoma (HCC) D35.2    Obesity, morbid ([facility] E66.01    Moderate episode of recurrent major depressive disorder ([facility] F33.1    SHIMON (generalized anxiety disorder) F41.1    Type 2 diabetes mellitus with hyperglycemia, with long-term current use of insulin ([facility]) E11.65, Z79.4     Allergies   Allergen Reactions    Amoxicillin Hives    Erythromycin Other (comments)     Stomach cramps    Penicillins Hives    Sulfa (Sulfonamide Antibiotics) Hives     Past Medical History:   Diagnosis Date    Anxiety     Arthritis     Diabetic neuropathy ([address]     DM (diabetes mellitus) (HCC)     GERD (gastroesophageal reflux disease)     HTN (hypertension)     Hyperprolactinemia (HCC)     Hypogonadotropic hypogonadism (HCC)     Obesity, morbid ([address]     TROY on CPAP     Pituitary macroadenoma ([address] December 2014     Past Surgical History:   Procedure Laterality Date    HX COLONOSCOPY  7/14    HX WISDOM TEETH EXTRACTION       Family History   Problem Relation Age of Onset    Hypertension Mother     Diabetes Father     Hypertension Brother     Diabetes Maternal Grandfather     Diabetes Paternal Grandfather      Social History     Tobacco Use    Smoking status: Never Smoker    Smokeless tobacco: Never Used   Substance Use Topics    Alcohol use: Yes        Lab Results   Component Value Date/Time    WBC 8.4 04/12/2021 12:00 AM    HGB 13.1 04/12/2021 12:00 AM    HCT 39.8 04/12/2021 12:00 AM    PLATELET 635 24/63/3130 12:00 AM    MCV 81 04/12/2021 12:00 AM     Lab Results   Component Value Date/Time    Hemoglobin A1c 8.8 (H) 06/09/2021 12:00 AM    Hemoglobin A1c 9.6 (H) 10/26/2020 04:03 PM    Hemoglobin A1c 7.0 (H) 02/25/2020 03:39 PM    Glucose 296 (H) 06/09/2021 12:00 AM    Glucose  10/31/2019 09:36 AM    Microalbumin/Creat ratio (mg/g creat) 25 02/25/2020 03:39 PM    Microalbumin,urine random 4.96 02/25/2020 03:39 PM    LDL,Direct 150 (H) 10/26/2020 04:03 PM    LDL, calculated 133 (H) 04/12/2021 12:00 AM    LDL, calculated 110 (H) 02/13/2018 02:00 PM    Creatinine (POC) 1.0 11/06/2014 04:04 PM    Creatinine 0.91 06/09/2021 12:00 AM      Lab Results   Component Value Date/Time    Cholesterol, total 190 04/12/2021 12:00 AM    HDL Cholesterol 35 (L) 04/12/2021 12:00 AM    LDL,Direct 150 (H) 10/26/2020 04:03 PM    LDL, calculated 133 (H) 04/12/2021 12:00 AM    LDL, calculated 110 (H) 02/13/2018 02:00 PM    Triglyceride 121 04/12/2021 12:00 AM     Lab Results   Component Value Date/Time    ALT (SGPT) 31 04/12/2021 12:00 AM    Alk.  phosphatase 70 04/12/2021 12:00 AM    Bilirubin, total 0.4 04/12/2021 12:00 AM    Albumin 4.2 04/12/2021 12:00 AM    Protein, total 7.4 04/12/2021 12:00 AM    PLATELET 110 67/65/3588 12:00 AM     Lab Results   Component Value Date/Time    GFR est non- 06/09/2021 12:00 AM    GFRNA, POC >60 11/06/2014 04:04 PM    GFR est  06/09/2021 12:00 AM    GFRAA, POC >60 11/06/2014 04:04 PM    Creatinine 0.91 06/09/2021 12:00 AM    Creatinine (POC) 1.0 11/06/2014 04:04 PM    BUN 16 06/09/2021 12:00 AM    Sodium 134 06/09/2021 12:00 AM    Potassium 4.7 06/09/2021 12:00 AM    Chloride 95 (L) 06/09/2021 12:00 AM    CO2 26 06/09/2021 12:00 AM     Lab Results   Component Value Date/Time    TSH 1.280 09/11/2020 12:48 PM    T4, Free 1.38 01/02/2014 08:42 AM         Review of Systems  A comprehensive review of systems was negative except for that written in the HPI. Objective:     Visit Vitals  BP (!) 166/92 (BP 1 Location: Left upper arm, BP Patient Position: Sitting, BP Cuff Size: Adult)   Pulse 83   Temp 98.7 °F (37.1 °C) (Oral)   Resp 18   Ht 5' 9\" (1.753 m)   Wt (!) 366 lb 12.8 oz (166.4 kg)   SpO2 98%   BMI 54.17 kg/m²     Physical Examination: General appearance - alert, well appearing, and in no distress and well hydrated  Mental status - normal mood, behavior, speech, dress, motor activity, and thought processes  Eyes - sclera anicteric  Neck - supple, no significant adenopathy, thyroid exam: thyroid is normal in size without nodules or tenderness  Chest - clear to auscultation, no wheezes, rales or rhonchi, symmetric air entry  Heart - normal rate, regular rhythm, normal S1, S2, no murmurs, rubs, clicks or gallops, normal bilateral carotid upstroke without bruits, no JVD  Abdomen - soft, nontender, nondistended, no masses or organomegaly  bowel sounds normal  Neurological - alert, oriented, normal speech, no focal findings or movement disorder noted  Extremities - intact peripheral pulses, bilateral lower leg, ankle, pedal edema 1+  Skin - normal coloration and turgor, no rashes      Assessment/Plan:       Diabetic issues reviewed with him: low cholesterol diet, weight control and daily exercise discussed, home glucose monitoring emphasized, all medications, side effects and compliance discussed carefully, foot care discussed and Podiatry visits discussed, annual eye examinations at Ophthalmology discussed, glycohemoglobin and other lab monitoring discussed and long term diabetic complications discussed. Diagnoses and all orders for this visit:    1.  Type 2 diabetes mellitus with retinopathy, with long-term current use of insulin, macular edema presence unspecified, unspecified laterality, unspecified retinopathy severity ([address]  On insulin only at this point, though GI symptoms did not improve appreciably off of metformin and ozempic, recommend he discuss restarting with his endocrinologist  A1C above goal, but down to 8.8 compared to 11.9 at last check in March  Encouraged improved compliance with recommended diet  Encouraged increased physical activity  Encouraged weight loss  -     HEMOGLOBIN A1C WITH EAG; Future    2. Essential hypertension  Above goal  Continue current rx  Low sodium diet  Repeat BP assessment in 2-4 weeks  -     METABOLIC PANEL, BASIC; Future    3. Chronic diastolic congestive heart failure (HCC)  Edema slowing improving  BNP continues to decrease  Low sodium diet recommended  Continue losartan-hctz  Continue bumex  F/u with Dr. Jacqui Browning as scheduled  -     NT-PRO BNP; Future    4. Change in bowel habits  5. Diarrhea, unspecified type  Change in bowel habits  Diarrhea, abdominal bloating and pain are causing significant distress and time missed from work  Needs further w/u, referring to GI for eval   -     REFERRAL TO GASTROENTEROLOGY    Other orders  -     METABOLIC PANEL, BASIC  -     HEMOGLOBIN A1C WITH EAG  -     NT-PRO BNP      Follow-up and Dispositions    · Return in about 4 weeks (around 7/7/2021), or if symptoms worsen or fail to improve. I have discussed the diagnosis with the patient and the intended plan as seen in the above orders. The patient has received an after-visit summary and questions were answered concerning future plans. Patient conveyed understanding of the plan at the time of the visit.     Allie Diaz NP

## 2021-06-21 NOTE — PROGRESS NOTES
Electrolytes, kidney function normal with the exception of elevated glucose  A1c remains above goal but has improved significantly compared to value of 11.9 in march. Recommend continuing to work on reducing sugar and simple carbs in diet. Since your GI symptoms have not improved much off of ozempic and metformin, recommend you also discuss the possibility of restarting one of both of these at your scheduled visit with Dr. Isabel Braswell. BNP continues to decrease.  Be sure to follow low sodium diet and take your bumex and losartan-hctz as prescribed

## 2021-07-14 ENCOUNTER — OFFICE VISIT (OUTPATIENT)
Dept: FAMILY MEDICINE CLINIC | Age: 46
End: 2021-07-14
Payer: COMMERCIAL

## 2021-07-14 VITALS
RESPIRATION RATE: 18 BRPM | OXYGEN SATURATION: 95 % | HEIGHT: 69 IN | TEMPERATURE: 98.2 F | BODY MASS INDEX: 46.65 KG/M2 | HEART RATE: 84 BPM | SYSTOLIC BLOOD PRESSURE: 153 MMHG | DIASTOLIC BLOOD PRESSURE: 85 MMHG | WEIGHT: 315 LBS

## 2021-07-14 DIAGNOSIS — F33.1 MODERATE EPISODE OF RECURRENT MAJOR DEPRESSIVE DISORDER (HCC): ICD-10-CM

## 2021-07-14 DIAGNOSIS — Z79.4 TYPE 2 DIABETES MELLITUS WITH HYPERGLYCEMIA, WITH LONG-TERM CURRENT USE OF INSULIN (HCC): Primary | ICD-10-CM

## 2021-07-14 DIAGNOSIS — I50.32 CHRONIC DIASTOLIC CONGESTIVE HEART FAILURE (HCC): ICD-10-CM

## 2021-07-14 DIAGNOSIS — I10 ESSENTIAL HYPERTENSION: ICD-10-CM

## 2021-07-14 DIAGNOSIS — E11.65 TYPE 2 DIABETES MELLITUS WITH HYPERGLYCEMIA, WITH LONG-TERM CURRENT USE OF INSULIN (HCC): Primary | ICD-10-CM

## 2021-07-14 DIAGNOSIS — F41.1 GAD (GENERALIZED ANXIETY DISORDER): ICD-10-CM

## 2021-07-14 PROCEDURE — 3052F HG A1C>EQUAL 8.0%<EQUAL 9.0%: CPT | Performed by: NURSE PRACTITIONER

## 2021-07-14 PROCEDURE — 99214 OFFICE O/P EST MOD 30 MIN: CPT | Performed by: NURSE PRACTITIONER

## 2021-07-14 RX ORDER — DULOXETIN HYDROCHLORIDE 60 MG/1
60 CAPSULE, DELAYED RELEASE ORAL DAILY
Qty: 90 CAPSULE | Refills: 1 | Status: SHIPPED | OUTPATIENT
Start: 2021-07-14 | End: 2022-09-09 | Stop reason: SDUPTHER

## 2021-07-14 NOTE — PROGRESS NOTES
Nida Wood is a 54 y.o. male      Chief Complaint   Patient presents with    Follow-up     4 weeks     1. Have you been to the ER, urgent care clinic since your last visit? Hospitalized since your last visit? No    2. Have you seen or consulted any other health care providers outside of the [address] [place] [address] since your last visit? Include any pap smears or colon screening.  No

## 2021-07-24 PROBLEM — I50.32 CHRONIC DIASTOLIC CONGESTIVE HEART FAILURE (HCC): Status: ACTIVE | Noted: 2021-07-24

## 2021-07-25 NOTE — PROGRESS NOTES
Subjective:     Maggie Burgos is a 54 y.o. male seen for follow up of diabetes, htn, hyperlipidemia, and CHF. Diabetic Review of Systems - medication compliance: compliant all of the time, diabetic diet compliance: noncompliant some of the time, home glucose monitoring: is performed regularly, no log today, further diabetic ROS: no polyuria or polydipsia, no chest pain, dyspnea or TIA's, no numbness, tingling or pain in extremities, no unusual visual symptoms, no hypoglycemia, no medication side effects noted, weight has decreased, acute symptoms are none. Cardiovascular risk analysis - LDL goal is under 70  diabetic  hypertension  hyperlipidemia  obese  sedentary lifestyle. Compliance with treatment thus far has been good. ROS: taking medications as instructed, no medication side effects noted, no TIA's, no chest pain on exertion, no dyspnea on exertion, no swelling of ankles, no orthostatic dizziness or lightheadedness, no orthopnea or paroxysmal nocturnal dyspnea, no palpitations, no intermittent claudication symptoms. Diet and Lifestyle: not attempting to follow a low fat, low cholesterol diet, generally follows a low sodium diet, does not rigorously follow a diabetic diet, sedentary  Home BP Monitoring: is not measured at home    Cardiovascular ROS: taking medications as instructed, no medication side effects noted, no TIA's, no chest pain on exertion, no dyspnea on exertion, no swelling of ankles, no orthostatic dizziness or lightheadedness, no orthopnea or paroxysmal nocturnal dyspnea, no palpitations, no intermittent claudication symptoms. Other symptoms and concerns: patient notes GI symptoms have settled down, he is feeling much better in this regard with diarrhea and abdominal bloating now infrequent. Good compliance with prescribed diuretic and ARB. Weight is down an additional 9 lbs in the past 4 weeks on bumex 1 mg daily. He is not weighing himself daily.  He does note improved activity tolerance. Denies orthopnea. Reports improving LE edema. Has been eating out a lot but trying to follow low sodium diet. Reports depression and anxiety are well-controlled on current dose duloxetine, no thoughts of harming self or others. Good compliance with rx, tolerating well, no apparent side effects.        Patient Active Problem List   Diagnosis Code    Type 2 diabetes mellitus with retinopathy, without long-term current use of insulin ([address] E11.319    HTN (hypertension) I10    TROY on CPAP G47.33, Z99.89    Hypogonadism male E29.1    Hyperprolactinemia (HCC) E22.1    Hyperlipidemia E78.5    Pituitary macroadenoma (HCC) D35.2    Obesity, morbid ([address] E66.01    Moderate episode of recurrent major depressive disorder ([address] F33.1    SHIMON (generalized anxiety disorder) F41.1    Type 2 diabetes mellitus with hyperglycemia, with long-term current use of insulin ([facility]) E11.65, Z79.4    Chronic diastolic congestive heart failure (HCC) I50.32     Allergies   Allergen Reactions    Amoxicillin Hives    Erythromycin Other (comments)     Stomach cramps    Penicillins Hives    Sulfa (Sulfonamide Antibiotics) Hives     Past Medical History:   Diagnosis Date    Anxiety     Arthritis     Diabetic neuropathy ([address]     DM (diabetes mellitus) ([address]     GERD (gastroesophageal reflux disease)     HTN (hypertension)     Hyperprolactinemia (HCC)     Hypogonadotropic hypogonadism (HCC)     Obesity, morbid ([address]     TROY on CPAP     Pituitary macroadenoma ([address] December 2014     Past Surgical History:   Procedure Laterality Date    HX COLONOSCOPY  7/14    HX WISDOM TEETH EXTRACTION       Family History   Problem Relation Age of Onset    Hypertension Mother     Diabetes Father     Hypertension Brother     Diabetes Maternal Grandfather     Diabetes Paternal Grandfather      Social History     Tobacco Use    Smoking status: Never Smoker    Smokeless tobacco: Never Used   Substance Use Topics    Alcohol use: Yes        Lab Results   Component Value Date/Time    WBC 8.4 04/12/2021 12:00 AM    HGB 13.1 04/12/2021 12:00 AM    HCT 39.8 04/12/2021 12:00 AM    PLATELET 532 33/47/8793 12:00 AM    MCV 81 04/12/2021 12:00 AM     Lab Results   Component Value Date/Time    Hemoglobin A1c 8.8 (H) 06/09/2021 12:00 AM    Hemoglobin A1c 9.6 (H) 10/26/2020 04:03 PM    Hemoglobin A1c 7.0 (H) 02/25/2020 03:39 PM    Glucose 296 (H) 06/09/2021 12:00 AM    Glucose  10/31/2019 09:36 AM    Microalbumin/Creat ratio (mg/g creat) 25 02/25/2020 03:39 PM    Microalbumin,urine random 4.96 02/25/2020 03:39 PM    LDL,Direct 150 (H) 10/26/2020 04:03 PM    LDL, calculated 133 (H) 04/12/2021 12:00 AM    LDL, calculated 110 (H) 02/13/2018 02:00 PM    Creatinine (POC) 1.0 11/06/2014 04:04 PM    Creatinine 0.91 06/09/2021 12:00 AM      Lab Results   Component Value Date/Time    Cholesterol, total 190 04/12/2021 12:00 AM    HDL Cholesterol 35 (L) 04/12/2021 12:00 AM    LDL,Direct 150 (H) 10/26/2020 04:03 PM    LDL, calculated 133 (H) 04/12/2021 12:00 AM    LDL, calculated 110 (H) 02/13/2018 02:00 PM    Triglyceride 121 04/12/2021 12:00 AM     Lab Results   Component Value Date/Time    ALT (SGPT) 31 04/12/2021 12:00 AM    Alk.  phosphatase 70 04/12/2021 12:00 AM    Bilirubin, total 0.4 04/12/2021 12:00 AM    Albumin 4.2 04/12/2021 12:00 AM    Protein, total 7.4 04/12/2021 12:00 AM    PLATELET 072 84/03/4813 12:00 AM     Lab Results   Component Value Date/Time    GFR est non- 06/09/2021 12:00 AM    GFRNA, POC >60 11/06/2014 04:04 PM    GFR est  06/09/2021 12:00 AM    GFRAA, POC >60 11/06/2014 04:04 PM    Creatinine 0.91 06/09/2021 12:00 AM    Creatinine (POC) 1.0 11/06/2014 04:04 PM    BUN 16 06/09/2021 12:00 AM    Sodium 134 06/09/2021 12:00 AM    Potassium 4.7 06/09/2021 12:00 AM    Chloride 95 (L) 06/09/2021 12:00 AM    CO2 26 06/09/2021 12:00 AM     Lab Results   Component Value Date/Time    TSH 1.280 09/11/2020 12:48 PM T4, Free 1.38 01/02/2014 08:42 AM         Review of Systems  A comprehensive review of systems was negative except for that written in the HPI. Objective:     Visit Vitals  BP (!) 153/85 (BP 1 Location: Left upper arm, BP Patient Position: Sitting, BP Cuff Size: Adult)   Pulse 84   Temp 98.2 °F (36.8 °C) (Oral)   Resp 18   Ht 5' 9\" (1.753 m)   Wt (!) 357 lb (161.9 kg)   SpO2 95%   BMI 52.72 kg/m²     Physical Examination: General appearance - alert, well appearing, and in no distress, oriented to person, place, and time and well hydrated  Mental status - normal mood, behavior, speech, dress, motor activity, and thought processes  Eyes - sclera anicteric  Neck - supple, no significant adenopathy, thyroid exam: thyroid is normal in size without nodules or tenderness  Chest - clear to auscultation, no wheezes, rales or rhonchi, symmetric air entry  Heart - normal rate, regular rhythm, normal S1, S2, no murmurs, rubs, clicks or gallops, normal bilateral carotid upstroke without bruits, no JVD  Abdomen - soft, nontender, nondistended, no masses or organomegaly  bowel sounds normal  Neurological - alert, oriented, normal speech, no focal findings or movement disorder noted  Extremities - no pedal edema noted, intact peripheral pulses  Skin - normal coloration and turgor, no rashes,      Assessment/Plan:     Diabetic issues reviewed with him: low cholesterol diet, weight control and daily exercise discussed, home glucose monitoring emphasized, all medications, side effects and compliance discussed carefully, foot care discussed and Podiatry visits discussed, annual eye examinations at Ophthalmology discussed, glycohemoglobin and other lab monitoring discussed and long term diabetic complications discussed. Diagnoses and all orders for this visit:    1.  Type 2 diabetes mellitus with hyperglycemia, with long-term current use of insulin (HCC)  Above goal  Currently on longacting and mealtime/SS insulin; metformin and GLP-1 were discontinued due to GI intolerance/severe diarrhea. Would benefit from SGLTI as now has diastolic HF- patient reports rx was too expensive before. Will attempt prior auth as he has now failed 2 other classes; pioglitazone not an option due to HF/edema. Encouraged improved compliance with recommended diet and exercise plan. Recommended weight loss  -     empagliflozin (Jardiance) 10 mg tablet; Take 1 Tablet by mouth daily. 2. Moderate episode of recurrent major depressive disorder (HCC)  Controlled  Continue rx  -     DULoxetine (CYMBALTA) 60 mg capsule; Take 1 Capsule by mouth daily. 3. Essential hypertension  Above goal  Will continue losartan-hctz for now and reach out to his cardiologist to see if further intervention is needed prior to his cardiology f/u next month  Hopeful that empagliflozin will add some BP reduction as well but there will likely be a delay before medication can be started due to prior auth process  Improve compliance with low sodium diet  Weight loss recommended     4. Chronic diastolic congestive heart failure (HCC)  Weight down additional 9 lbs with bumex 1 mg daily  Continue diuresis  Low sodium diet  F/u with cardiology as scheduled    5. SHIMON (generalized anxiety disorder)  Controlled  Continue rx  -     DULoxetine (CYMBALTA) 60 mg capsule; Take 1 Capsule by mouth daily. Follow-up and Dispositions    · Return in about 4 weeks (around 8/11/2021) for blood pressure. I have discussed the diagnosis with the patient and the intended plan as seen in the above orders. The patient has received an after-visit summary and questions were answered concerning future plans. Patient conveyed understanding of the plan at the time of the visit.     Saji Doherty NP

## 2021-07-25 NOTE — PATIENT INSTRUCTIONS
Learning About Self-Care for Heart Failure  What is self-care for heart failure? Heart failure usually gets worse over time. But there are many things you can do to feel better, avoid the hospital, and live longer. Self-care means managing your health by doing certain things every day, like weighing yourself. It's about knowing which symptoms to watch for so you can avoid getting worse. When you practice good self-care, you know when it's time to call your doctor and when your heart failure has turned into an emergency. The list below can help. Top 5 self-care tips for every day   1. Take your medicines as prescribed. This gives them the best chance of helping you. 2. Weigh yourself every day. This helps you watch for signs that you're getting worse. Weight gain may be a sign that your body is holding on to too much fluid. Weigh yourself at the same time each day, using the same scale, on a hard, flat surface. The best time is in the morning after you go to the bathroom and before you eat or drink anything. 3. Keep a daily record of your symptoms. Checking your symptoms helps you see what symptoms are normal for you and if they change or get worse. 4. Limit sodium. This helps keep fluid from building up and may help you feel better. Your doctor can tell you how much sodium is right for you. An example is less than 3,000 milligrams (mg) a day. Try limiting the salt you eat at home, and by watching for \"hidden\" sodium when you eat out or shop for food. 5. Try to exercise throughout the week. Exercise makes your heart stronger and can help you avoid symptoms. Walking is a great way to get exercise. If your doctor says it's safe, start out with some short walks. Then slowly build up to longer ones. Some people with heart failure also may need to limit how much fluid they drink each day. Ask your doctor if this is true for you and, if it is, how much fluid is safe for you to drink each day.   When should you act? Try to become familiar with signs that mean your heart failure is getting worse. If you need help, talk with your doctor about making a personal plan. Here are some things to watch for as you practice your daily self-care. Call your doctor if:  · You have sudden weight gain, such as more than 2 to 3 pounds in a day or 5 pounds in a week. (Your doctor may suggest a different range of weight gain.)  · You have new or worse swelling in your feet, ankles, or legs. · Your breathing gets worse. Activities that did not make you short of breath before are hard for you now. · Your breathing when you lie down is worse than usual, or you wake up at night needing to catch your breath. Be sure to make and go to all of your follow-up appointments. And it's always a good idea to call your doctor anytime you have a sudden change in symptoms. When is it an emergency? Sometimes the symptoms get worse very quickly. This is called sudden heart failure. It causes fluid to build up in your lungs. Sudden heart failure is an emergency. If you have any of these symptoms, you need care right away. Call 911 if:   · You have severe shortness of breath. · You have an irregular or fast heartbeat. · You cough up foamy, pink mucus. What else can you do to stay healthy? There are other things you can do to take care of your body and your heart. These things will help you feel better. And they will also reduce your risk of heart attack and stroke. · Try to stay at a healthy weight. Eat a healthy diet with lots of fresh fruit, vegetables, and whole grains. · If you smoke, quit. · Limit the amount of alcohol you drink. · Keep high blood pressure and diabetes under control. If you need help, talk with your doctor. If your doctor has not set you up with a cardiac rehabilitation (rehab) program, talk to him or her about whether that is right for you.  Cardiac rehab includes exercise, help with diet and lifestyle changes, and emotional support. Also let your doctor know if:  · You're having trouble sleeping. Sleep is important to your well-being. It also helps your heart work the way it's supposed to. Your doctor can help you decide if you need treatment for sleep problems. · You're feeling sad or hopeless much of the time, or you are worried and anxious. Heart failure can be hard on your emotions. Treatment with counseling and medicine can help. And when you feel better, you're more likely to take care of yourself. · You think you may have a problem with alcohol or drug use. You and your doctor can decide if you have a problem and what type of treatment might help you. Follow-up care is a key part of your treatment and safety. Be sure to make and go to all appointments, and call your doctor if you are having problems. It's also a good idea to know your test results and keep a list of the medicines you take. Where can you learn more? Go to http://www.gray.com/  Enter H262 in the search box to learn more about \"Learning About Self-Care for Heart Failure. \"  Current as of: August 31, 2020               Content Version: 12.8  © 3312-9235 [employer], [employer]. Care instructions adapted under license by [employer] (which disclaims liability or warranty for this information). If you have questions about a medical condition or this instruction, always ask your healthcare professional. [place] [address] any warranty or liability for your use of this information.

## 2021-08-18 ENCOUNTER — OFFICE VISIT (OUTPATIENT)
Dept: CARDIOLOGY CLINIC | Age: 46
End: 2021-08-18
Payer: COMMERCIAL

## 2021-08-18 VITALS
BODY MASS INDEX: 46.65 KG/M2 | OXYGEN SATURATION: 96 % | DIASTOLIC BLOOD PRESSURE: 80 MMHG | HEIGHT: 69 IN | WEIGHT: 315 LBS | HEART RATE: 75 BPM | SYSTOLIC BLOOD PRESSURE: 152 MMHG

## 2021-08-18 DIAGNOSIS — I10 ESSENTIAL HYPERTENSION: Primary | ICD-10-CM

## 2021-08-18 DIAGNOSIS — E78.5 DYSLIPIDEMIA: ICD-10-CM

## 2021-08-18 DIAGNOSIS — R73.09 ELEVATED GLUCOSE: ICD-10-CM

## 2021-08-18 DIAGNOSIS — R06.02 SOB (SHORTNESS OF BREATH): ICD-10-CM

## 2021-08-18 PROCEDURE — 99214 OFFICE O/P EST MOD 30 MIN: CPT | Performed by: SPECIALIST

## 2021-08-18 RX ORDER — SPIRONOLACTONE 25 MG/1
25 TABLET ORAL DAILY
Qty: 30 TABLET | Refills: 12 | Status: SHIPPED | OUTPATIENT
Start: 2021-08-18 | End: 2022-09-09 | Stop reason: SDUPTHER

## 2021-08-18 NOTE — PROGRESS NOTES
Chelsi Sethi MD. [facility]              Patient: Lucy Menchaca  : 1975      Today's Date: 2021          HISTORY OF PRESENT ILLNESS:     History of Present Illness:  He has stopped taking Bumex since starting Jardiance - ankles less swollen. BP still high. He is feeling better on Jardiance - less SOB. PAST MEDICAL HISTORY:     Past Medical History:   Diagnosis Date    Anxiety     Arthritis     Diabetic neuropathy ([address]     DM (diabetes mellitus) ([address]     GERD (gastroesophageal reflux disease)     HTN (hypertension)     Hyperprolactinemia ([address]     Hypogonadotropic hypogonadism ([address]     Obesity, morbid ([address]     TROY on CPAP     Pituitary macroadenoma [facility] 2014         Past Surgical History:   Procedure Laterality Date    HX COLONOSCOPY      HX WISDOM TEETH EXTRACTION             MEDICATIONS:     Current Outpatient Medications   Medication Sig Dispense Refill    empagliflozin (Jardiance) 10 mg tablet Take 1 Tablet by mouth daily. 90 Tablet 0    DULoxetine (CYMBALTA) 60 mg capsule Take 1 Capsule by mouth daily. 90 Capsule 1    blood-glucose sensor (DEXCOM G6 SENSOR) by Does Not Apply route.  testosterone cypionate (DEPOTESTOTERONE CYPIONATE) 200 mg/mL injection INJECT 1.25 ML INTO THE MUSCLE ONCE EVERY 2 WEEKS 6 mL 4    insulin lispro (HumaLOG KwikPen Insulin) 200 unit/mL (3 mL) inpn Inject 40 - 50 units before meals w/SSI Max units daily: 185 30 mL 11    losartan-hydroCHLOROthiazide (HYZAAR) 100-25 mg per tablet Take 1 Tab by mouth daily. Stop Prinzide 90 Tab 3    insulin glargine U-300 conc (T[employer] SoloStar U-300 Insulin) 300 unit/mL (1.5 mL) inpn pen 75 Units by SubCUTAneous route two (2) times a day. 90 mL 3    Syringe, Disposable, 3 mL syrg Once Q 2 weeks 10 Syringe 3    Needle, Disp, 22 G 22 gauge x 1 1/2\" ndle Q 2 weeks 10 Each 4    cabergoline (DOSTINEX) 0.5 mg tablet Take 0.5 Tabs by mouth two (2) times a week.  4 Tab 5       Allergies Allergen Reactions    Amoxicillin Hives    Erythromycin Other (comments)     Stomach cramps    Penicillins Hives    Sulfa (Sulfonamide Antibiotics) Hives             SOCIAL HISTORY:     Social History     Tobacco Use    Smoking status: Never Smoker    Smokeless tobacco: Never Used   Vaping Use    Vaping Use: Never used   Substance Use Topics    Alcohol use: Yes    Drug use: No         FAMILY HISTORY:     Family History   Problem Relation Age of Onset    Hypertension Mother     Diabetes Father     Hypertension Brother     Diabetes Maternal Grandfather     Diabetes Paternal Grandfather              REVIEW OF SYMPTOMS:     Review of Symptoms:  Constitutional: Negative for fever, chills  HEENT: Negative for nosebleeds, tinnitus, and vision changes. Respiratory: Negative for cough, wheezing  Cardiovascular: Negative for orthopnea (sleeps on his stomach) claudication, syncope, and PND. Gastrointestinal: Negative for abdominal pain, diarrhea, melena. Genitourinary: Negative for dysuria  Musculoskeletal: Negative for myalgias. Skin: Negative for rash  Heme: No problems bleeding. Neurological: Negative for speech change and focal weakness. PHYSICAL EXAM:     Physical Exam:  Visit Vitals  BP (!) 152/80 (BP 1 Location: Left upper arm, BP Patient Position: Sitting, BP Cuff Size: Adult)   Pulse 75   Ht 5' 9\" (1.753 m)   Wt (!) 354 lb (160.6 kg)   SpO2 96%   BMI 52.28 kg/m²     Patient appears generally well, mood and affect are appropriate and pleasant. HEENT:  Hearing intact, non-icteric, normocephalic, atraumatic. Neck Exam: Supple, No carotid bruits. Hard to assess JVD. Lung Exam: Clear to auscultation, even breath sounds. Cardiac Exam: Regular rate and rhythm with no murmur or rub  Abdomen: Soft, non-tender, normal bowel sounds. Obese  Extremities: Moves all ext well. 1+ bilat lower extremity edema to knees.   MSKTL: Overall good ROM ext  Skin: No significant rashes  Psych: Appropriate affect  Neuro - Grossly intact      LABS / OTHER STUDIES reviewed:     Lab Results   Component Value Date/Time    Sodium 134 06/09/2021 12:00 AM    Potassium 4.7 06/09/2021 12:00 AM    Chloride 95 (L) 06/09/2021 12:00 AM    CO2 26 06/09/2021 12:00 AM    Anion gap 9 10/26/2020 04:03 PM    Glucose 296 (H) 06/09/2021 12:00 AM    BUN 16 06/09/2021 12:00 AM    Creatinine 0.91 06/09/2021 12:00 AM    BUN/Creatinine ratio 18 06/09/2021 12:00 AM    GFR est  06/09/2021 12:00 AM    GFR est non- 06/09/2021 12:00 AM    Calcium 9.5 06/09/2021 12:00 AM    Bilirubin, total 0.4 04/12/2021 12:00 AM    Alk. phosphatase 70 04/12/2021 12:00 AM    Protein, total 7.4 04/12/2021 12:00 AM    Albumin 4.2 04/12/2021 12:00 AM    Globulin 4.0 02/25/2020 03:39 PM    A-G Ratio 1.3 04/12/2021 12:00 AM    ALT (SGPT) 31 04/12/2021 12:00 AM    AST (SGOT) 17 04/12/2021 12:00 AM     Lab Results   Component Value Date/Time    WBC 8.4 04/12/2021 12:00 AM    HGB 13.1 04/12/2021 12:00 AM    HCT 39.8 04/12/2021 12:00 AM    PLATELET 173 93/36/2955 12:00 AM    MCV 81 04/12/2021 12:00 AM       Lab Results   Component Value Date/Time    Cholesterol, total 190 04/12/2021 12:00 AM    HDL Cholesterol 35 (L) 04/12/2021 12:00 AM    LDL,Direct 150 (H) 10/26/2020 04:03 PM    LDL, calculated 133 (H) 04/12/2021 12:00 AM    LDL, calculated 110 (H) 02/13/2018 02:00 PM    VLDL, calculated 22 04/12/2021 12:00 AM    VLDL, calculated 30 02/13/2018 02:00 PM    Triglyceride 121 04/12/2021 12:00 AM       Component      Latest Ref Rng & Units 4/12/2021          12:00 AM   NT pro-BNP      0 - 121 pg/mL 541 (H)     Lab Results   Component Value Date/Time    Hemoglobin A1c 8.8 (H) 06/09/2021 12:00 AM    Hemoglobin A1c (POC) 11.9 03/16/2021 03:49 PM             CARDIAC DIAGNOSTICS:     Cardiac Evaluation Includes:  I reviewed the results below. EKG 4/28/21 - NSR, 1st degree AVB, NSST changes    Echo 5/11/21 - TDS.   LVEF 60-65%, Grade 3 diastology, LAE       ASSESSMENT AND PLAN:     Assessment and Plan:    1) SOB / Possible diastolic CHF / Venous insufficiency ? - Echo 5/11/21 - TDS. LVEF 60-65%, Grade 3 diastology, LAE  - He is feeling better on Jardiance - less SOB and edema   ---> will add aldactone as below for better BP control     2) HTN  - BP remains high --->  Add aldactone 25 mg daily to regimen - follow labs     3) Dyslipidemia  - will resume a statin --- start Crestor 10 mg daily and adjust later as needed     4) DM   - A1c 11.9 on 3/21 ---> managed per PCP   - doing better now     5) Obesity - he is working better on his diet     6) See me in 2 months. Enjoys electronics, works in IT. Kourtney Montero MD, [address]  [address], [facility]. Robbie Quevedo.  [place], [address] [place] [address]  Ph: [phone]   Ph [phone]

## 2021-08-18 NOTE — PROGRESS NOTES
Zain Shea is a 54 y.o. male    Visit Vitals  BP (!) 152/80 (BP 1 Location: Left upper arm, BP Patient Position: Sitting, BP Cuff Size: Adult)   Pulse 75   Ht 5' 9\" (1.753 m)   Wt (!) 354 lb (160.6 kg)   SpO2 96%   BMI 52.28 kg/m²       Chief Complaint   Patient presents with    Shortness of Breath    Hypertension       Chest pain NO  SOB NO  Dizziness NO  Swelling NO  Recent hospital visit NO  Refills NO  COVID VACCINE STATUS YES

## 2021-10-07 ENCOUNTER — HOSPITAL ENCOUNTER (EMERGENCY)
Age: 46
Discharge: HOME | End: 2021-10-07
Attending: EMERGENCY MEDICINE
Payer: COMMERCIAL

## 2021-10-07 ENCOUNTER — HOSPITAL ENCOUNTER (EMERGENCY)
Age: 46
Discharge: STILL ADMITTED OR ADMINISTRATIVE | End: 2021-10-07

## 2021-10-07 ENCOUNTER — APPOINTMENT (OUTPATIENT)
Dept: CT IMAGING | Age: 46
End: 2021-10-07
Attending: EMERGENCY MEDICINE
Payer: COMMERCIAL

## 2021-10-07 VITALS
RESPIRATION RATE: 18 BRPM | OXYGEN SATURATION: 96 % | DIASTOLIC BLOOD PRESSURE: 83 MMHG | HEART RATE: 86 BPM | WEIGHT: 315 LBS | SYSTOLIC BLOOD PRESSURE: 163 MMHG | BODY MASS INDEX: 46.65 KG/M2 | TEMPERATURE: 98.7 F | HEIGHT: 69 IN

## 2021-10-07 DIAGNOSIS — L08.9 SKIN INFECTION: Primary | ICD-10-CM

## 2021-10-07 LAB
ALBUMIN SERPL-MCNC: 3.6 G/DL (ref 3.5–5)
ALBUMIN/GLOB SERPL: 0.7 {RATIO} (ref 1.1–2.2)
ALP SERPL-CCNC: 91 U/L (ref 45–117)
ALT SERPL-CCNC: 28 U/L (ref 12–78)
ANION GAP SERPL CALC-SCNC: 9 MMOL/L (ref 5–15)
AST SERPL W P-5'-P-CCNC: 16 U/L (ref 15–37)
BASOPHILS # BLD: 0.1 K/UL (ref 0–0.1)
BASOPHILS NFR BLD: 0 % (ref 0–1)
BILIRUB SERPL-MCNC: 0.4 MG/DL (ref 0.2–1)
BUN SERPL-MCNC: 13 MG/DL (ref 6–20)
BUN/CREAT SERPL: 13 (ref 12–20)
CA-I BLD-MCNC: 9.9 MG/DL (ref 8.5–10.1)
CHLORIDE SERPL-SCNC: 97 MMOL/L (ref 97–108)
CO2 SERPL-SCNC: 31 MMOL/L (ref 21–32)
CREAT SERPL-MCNC: 0.99 MG/DL (ref 0.7–1.3)
DIFFERENTIAL METHOD BLD: ABNORMAL
EOSINOPHIL # BLD: 0.1 K/UL (ref 0–0.4)
EOSINOPHIL NFR BLD: 1 % (ref 0–7)
ERYTHROCYTE [DISTWIDTH] IN BLOOD BY AUTOMATED COUNT: 13.7 % (ref 11.5–14.5)
GLOBULIN SER CALC-MCNC: 5.1 G/DL (ref 2–4)
GLUCOSE SERPL-MCNC: 165 MG/DL (ref 65–100)
HCT VFR BLD AUTO: 45.8 % (ref 36.6–50.3)
HGB BLD-MCNC: 15.3 G/DL (ref 12.1–17)
IMM GRANULOCYTES # BLD AUTO: 0 K/UL (ref 0–0.04)
IMM GRANULOCYTES NFR BLD AUTO: 0 % (ref 0–0.5)
LYMPHOCYTES # BLD: 3 K/UL (ref 0.8–3.5)
LYMPHOCYTES NFR BLD: 26 % (ref 12–49)
MCH RBC QN AUTO: 27.7 PG (ref 26–34)
MCHC RBC AUTO-ENTMCNC: 33.4 G/DL (ref 30–36.5)
MCV RBC AUTO: 82.8 FL (ref 80–99)
MONOCYTES # BLD: 0.8 K/UL (ref 0–1)
MONOCYTES NFR BLD: 7 % (ref 5–13)
NEUTS SEG # BLD: 7.8 K/UL (ref 1.8–8)
NEUTS SEG NFR BLD: 66 % (ref 32–75)
PLATELET # BLD AUTO: 347 K/UL (ref 150–400)
PMV BLD AUTO: 9 FL (ref 8.9–12.9)
POTASSIUM SERPL-SCNC: 3.8 MMOL/L (ref 3.5–5.1)
PROT SERPL-MCNC: 8.7 G/DL (ref 6.4–8.2)
RBC # BLD AUTO: 5.53 M/UL (ref 4.1–5.7)
SODIUM SERPL-SCNC: 137 MMOL/L (ref 136–145)
WBC # BLD AUTO: 11.8 K/UL (ref 4.1–11.1)

## 2021-10-07 PROCEDURE — 36415 COLL VENOUS BLD VENIPUNCTURE: CPT

## 2021-10-07 PROCEDURE — 80053 COMPREHEN METABOLIC PANEL: CPT

## 2021-10-07 PROCEDURE — 99283 EMERGENCY DEPT VISIT LOW MDM: CPT

## 2021-10-07 PROCEDURE — 85025 COMPLETE CBC W/AUTO DIFF WBC: CPT

## 2021-10-07 PROCEDURE — 74011250637 HC RX REV CODE- 250/637: Performed by: EMERGENCY MEDICINE

## 2021-10-07 RX ORDER — CLINDAMYCIN HYDROCHLORIDE 150 MG/1
300 CAPSULE ORAL
Status: COMPLETED | OUTPATIENT
Start: 2021-10-07 | End: 2021-10-07

## 2021-10-07 RX ORDER — CLINDAMYCIN HYDROCHLORIDE 300 MG/1
300 CAPSULE ORAL 4 TIMES DAILY
Qty: 28 CAPSULE | Refills: 0 | Status: SHIPPED | OUTPATIENT
Start: 2021-10-07 | End: 2021-10-14

## 2021-10-07 RX ADMIN — CLINDAMYCIN HYDROCHLORIDE 300 MG: 150 CAPSULE ORAL at 22:40

## 2021-10-08 NOTE — ED PROVIDER NOTES
EMERGENCY DEPARTMENT HISTORY AND PHYSICAL EXAM      Date: 10/7/2021  Patient Name: Samantha Myers    History of Presenting Illness     Chief Complaint   Patient presents with    Skin Problem       History Provided By: Patient    HPI: Samantha Myers, 51 y.o. male with a past medical history significant diabetes, hypertension, obesity and chf presents to the ED with cc of redness, swelling, and pain to left side of the neck. States started about 5 days ago but doubled in size over the last 24 hours. Denies fevers or chills. No nausea or vomiting. No active drainage. Reports being hospitalized in the past for cellulitis above his right eye. There are no other complaints, changes, or physical findings at this time. PCP: Whitney Valentin NP    No current facility-administered medications on file prior to encounter. Current Outpatient Medications on File Prior to Encounter   Medication Sig Dispense Refill    spironolactone (ALDACTONE) 25 mg tablet Take 1 Tablet by mouth daily. 30 Tablet 12    empagliflozin (Jardiance) 10 mg tablet Take 1 Tablet by mouth daily. 90 Tablet 0    DULoxetine (CYMBALTA) 60 mg capsule Take 1 Capsule by mouth daily. 90 Capsule 1    testosterone cypionate (DEPOTESTOTERONE CYPIONATE) 200 mg/mL injection INJECT 1.25 ML INTO THE MUSCLE ONCE EVERY 2 WEEKS 6 mL 4    insulin lispro (HumaLOG KwikPen Insulin) 200 unit/mL (3 mL) inpn Inject 40 - 50 units before meals w/SSI Max units daily: 185 30 mL 11    losartan-hydroCHLOROthiazide (HYZAAR) 100-25 mg per tablet Take 1 Tab by mouth daily. Stop Prinzide 90 Tab 3    insulin glargine U-300 conc (Toujeo SoloStar U-300 Insulin) 300 unit/mL (1.5 mL) inpn pen 75 Units by SubCUTAneous route two (2) times a day. 90 mL 3    cabergoline (DOSTINEX) 0.5 mg tablet Take 0.5 Tabs by mouth two (2) times a week.  4 Tab 5       Past History     Past Medical History:  Past Medical History:   Diagnosis Date    Anxiety     Arthritis     [facility]     DM [facility]di[facility] mellitus[facility]     GERD (gastroesophageal reflux disease)     HTN (hypertension)     Hyperprolactinemia (HCC)     Hypogonadotropic hypogonadism (HCC)     [facility]bid [facility]     TROY on CPAP     Pituitary macroadenoma ([facility] December 2014       Past Surgical History:  Past Surgical History:   Procedure Laterality Date    HX COLONOSCOPY  7/14    HX WISDOM TEETH EXTRACTION         Family History:  Family History   Problem Relation Age of Onset    Hypertension Mother     Diabetes Father     Hypertension Brother     Diabetes Maternal Grandfather     Diabetes Paternal Grandfather        Social History:  Social History     Tobacco Use    Smoking status: Never Smoker    Smokeless tobacco: Never Used   Vaping Use    Vaping Use: Never used   Substance Use Topics    Alcohol use: Yes    Drug use: No       Allergies: Allergies   Allergen Reactions    Amoxicillin Hives    Erythromycin Other (comments)     Stomach cramps    Penicillins Hives    Sulfa (Sulfonamide Antibiotics) Hives         Review of Systems     Review of Systems   Constitutional: Negative for chills and fever. HENT: Negative for congestion and sore throat. Eyes: Negative for pain and redness. Respiratory: Negative for cough and shortness of breath. Cardiovascular: Negative for chest pain and leg swelling. Gastrointestinal: Negative for abdominal pain, diarrhea, nausea and vomiting. Genitourinary: Negative for dysuria, frequency, hematuria and urgency. Musculoskeletal: Negative for arthralgias and myalgias. Skin: Positive for wound. Negative for pallor and rash. Neurological: Negative for dizziness, numbness and headaches. Psychiatric/Behavioral: Negative for agitation and dysphoric mood. Physical Exam     Physical Exam  Vitals and nursing note reviewed. Constitutional:       General: He is not in acute distress. Appearance: Normal appearance.  He is not ill-appearing, toxic-appearing or diaphoretic. HENT:      Head: Normocephalic and atraumatic. Right Ear: External ear normal.      Left Ear: External ear normal.      Nose: Nose normal.      Mouth/Throat:      Mouth: Mucous membranes are moist.      Pharynx: Oropharynx is clear. No oropharyngeal exudate or posterior oropharyngeal erythema. Eyes:      Extraocular Movements: Extraocular movements intact. Conjunctiva/sclera: Conjunctivae normal.      Pupils: Pupils are equal, round, and reactive to light. Cardiovascular:      Rate and Rhythm: Normal rate and regular rhythm. Pulses: Normal pulses. Heart sounds: No murmur heard. No friction rub. No gallop. Pulmonary:      Effort: Pulmonary effort is normal. No respiratory distress. Breath sounds: Normal breath sounds. No stridor. No wheezing, rhonchi or rales. Abdominal:      General: Abdomen is flat. Bowel sounds are normal.      Palpations: Abdomen is soft. Tenderness: There is no abdominal tenderness. There is no guarding or rebound. Musculoskeletal:         General: No swelling, tenderness, deformity or signs of injury. Normal range of motion. Cervical back: Normal range of motion and neck supple. No muscular tenderness. Right lower leg: No edema. Left lower leg: No edema. Lymphadenopathy:      Cervical: No cervical adenopathy. Skin:     General: Skin is warm and dry. Capillary Refill: Capillary refill takes less than 2 seconds. Findings: No rash. Comments: Large erythematous, indurated, tender, hard mass to left posterolateral neck; no fluctuance, no active drainage   Neurological:      General: No focal deficit present. Mental Status: He is alert and oriented to person, place, and time. Cranial Nerves: No cranial nerve deficit. Motor: No weakness.    Psychiatric:         Mood and Affect: Mood normal.         Behavior: Behavior normal.         Diagnostic Study Results     Labs -     Recent Results (from the past 12 hour(s))   METABOLIC PANEL, COMPREHENSIVE    Collection Time: 10/07/21  9:45 PM   Result Value Ref Range    Sodium 137 136 - 145 mmol/L    Potassium 3.8 3.5 - 5.1 mmol/L    Chloride 97 97 - 108 mmol/L    CO2 31 21 - 32 mmol/L    Anion gap 9 5 - 15 mmol/L    Glucose 165 (H) 65 - 100 mg/dL    BUN 13 6 - 20 mg/dL    Creatinine 0.99 0.70 - 1.30 mg/dL    BUN/Creatinine ratio 13 12 - 20      GFR est AA >60 >60 ml/min/1.73m2    GFR est non-AA >60 >60 ml/min/1.73m2    Calcium 9.9 8.5 - 10.1 mg/dL    Bilirubin, total 0.4 0.2 - 1.0 mg/dL    AST (SGOT) 16 15 - 37 U/L    ALT (SGPT) 28 12 - 78 U/L    Alk. phosphatase 91 45 - 117 U/L    Protein, total 8.7 (H) 6.4 - 8.2 g/dL    Albumin 3.6 3.5 - 5.0 g/dL    Globulin 5.1 (H) 2.0 - 4.0 g/dL    A-G Ratio 0.7 (L) 1.1 - 2.2     CBC WITH AUTOMATED DIFF    Collection Time: 10/07/21  9:45 PM   Result Value Ref Range    WBC 11.8 (H) 4.1 - 11.1 K/uL    RBC 5.53 4.10 - 5.70 M/uL    HGB 15.3 12.1 - 17.0 g/dL    HCT 45.8 36.6 - 50.3 %    MCV 82.8 80.0 - 99.0 FL    MCH 27.7 26.0 - 34.0 PG    MCHC 33.4 30.0 - 36.5 g/dL    RDW 13.7 11.5 - 14.5 %    PLATELET 048 693 - 744 K/uL    MPV 9.0 8.9 - 12.9 FL    NEUTROPHILS 66 32 - 75 %    LYMPHOCYTES 26 12 - 49 %    MONOCYTES 7 5 - 13 %    EOSINOPHILS 1 0 - 7 %    BASOPHILS 0 0 - 1 %    IMMATURE GRANULOCYTES 0 0.0 - 0.5 %    ABS. NEUTROPHILS 7.8 1.8 - 8.0 K/UL    ABS. LYMPHOCYTES 3.0 0.8 - 3.5 K/UL    ABS. MONOCYTES 0.8 0.0 - 1.0 K/UL    ABS. EOSINOPHILS 0.1 0.0 - 0.4 K/UL    ABS. BASOPHILS 0.1 0.0 - 0.1 K/UL    ABS. IMM. GRANS. 0.0 0.00 - 0.04 K/UL    DF AUTOMATED         Radiologic Studies -   @lastxrresult@  CT Results  (Last 48 hours)    None        CXR Results  (Last 48 hours)    None            Medical Decision Making   I am the first provider for this patient. I reviewed the vital signs, available nursing notes, past medical history, past surgical history, family history and social history.     Vital Signs-Reviewed the patient's vital signs. Patient Vitals for the past 12 hrs:   Temp Pulse Resp BP SpO2   10/07/21 1954 98.7 °F (37.1 °C) 86 18 (!) 163/83 96 %       Records Reviewed: Nursing Notes      Provider Notes (Medical Decision Making): The patient presents with infection to left posterolateral neck; may be early abscess. No fluctuance noted but significant amount of induration. Will start patient on clindamycin. Given instructions to return to the ER with any increase in redness, swelling, pain, drainage of pus from the wound, fevers, or any other worsening, new or worrisome symptoms. Patient voices understanding and is agreeable to plan. [facility]         ED Course:   Initial assessment performed. The patients presenting problems have been discussed, and they are in agreement with the care plan formulated and outlined with them. I have encouraged them to ask questions as they arise throughout their visit. PROCEDURES  Procedures         PLAN:  1. Discharge Medication List as of 10/7/2021 10:36 PM      START taking these medications    Details   clindamycin (CLEOCIN) 300 mg capsule Take 1 Capsule by mouth four (4) times daily for 7 days. , Normal, Disp-28 Capsule, R-0         CONTINUE these medications which have NOT CHANGED    Details   spironolactone (ALDACTONE) 25 mg tablet Take 1 Tablet by mouth daily. , Normal, Disp-30 Tablet, R-12      empagliflozin (Jardiance) 10 mg tablet Take 1 Tablet by mouth daily. , Normal, Disp-90 Tablet, R-0      DULoxetine (CYMBALTA) 60 mg capsule Take 1 Capsule by mouth daily. , Normal, Disp-90 Capsule, R-1      testosterone cypionate (DEPOTESTOTERONE CYPIONATE) 200 mg/mL injection INJECT 1.25 ML INTO THE MUSCLE ONCE EVERY 2 WEEKS, Normal, Disp-6 mL, R-4      insulin lispro (HumaLOG KwikPen Insulin) 200 unit/mL (3 mL) inpn Inject 40 - 50 units before meals w/SSI Max units daily: 185, Normal, Disp-30 mL, R-11      losartan-hydroCHLOROthiazide (HYZAAR) 100-25 mg per tablet Take 1 Tab by mouth daily. Stop Prinzide, Normal, Disp-90 Tab, R-3      insulin glargine U-300 conc (Toujeo SoloStar U-300 Insulin) 300 unit/mL (1.5 mL) inpn pen 75 Units by SubCUTAneous route two (2) times a day., Normal, Disp-90 mL,R-3      cabergoline (DOSTINEX) 0.5 mg tablet Take 0.5 Tabs by mouth two (2) times a week., Normal, Disp-4 Tab, R-5           2. Follow-up Information     Follow up With Specialties Details Why Contact Info    Kory Pastrana NP Nurse Practitioner In 3 days As needed, If symptoms worsen [address]  [address]  [phone]          Return to ED if worse     Diagnosis     Clinical Impression:   1.  Skin infection

## 2021-10-08 NOTE — DISCHARGE INSTRUCTIONS
Take the clindamycin as prescribed   Recommend taking probiotics when taking this antibiotic (I.e. yogurt)  Warm compresses 4 times a day. Return to the ER with increasing redness, swelling, pain, drainage of pus from the wound, fevers, or any other worsening, new, or worrisome symptoms.

## 2021-10-29 DIAGNOSIS — E11.65 TYPE 2 DIABETES MELLITUS WITH HYPERGLYCEMIA, WITH LONG-TERM CURRENT USE OF INSULIN (HCC): ICD-10-CM

## 2021-10-29 DIAGNOSIS — E29.1 HYPOGONADISM MALE: ICD-10-CM

## 2021-10-29 DIAGNOSIS — Z79.4 TYPE 2 DIABETES MELLITUS WITH HYPERGLYCEMIA, WITH LONG-TERM CURRENT USE OF INSULIN (HCC): ICD-10-CM

## 2021-10-29 DIAGNOSIS — D35.2 PITUITARY MACROADENOMA (HCC): ICD-10-CM

## 2021-10-29 DIAGNOSIS — Z99.89 OSA ON CPAP: ICD-10-CM

## 2021-10-29 DIAGNOSIS — G47.33 OSA ON CPAP: ICD-10-CM

## 2021-10-29 DIAGNOSIS — E22.1 HYPERPROLACTINEMIA (HCC): ICD-10-CM

## 2021-10-29 RX ORDER — INSULIN GLARGINE 300 U/ML
75 INJECTION, SOLUTION SUBCUTANEOUS 2 TIMES DAILY
Qty: 90 ML | Refills: 3 | Status: SHIPPED | OUTPATIENT
Start: 2021-10-29 | End: 2022-09-14 | Stop reason: ALTCHOICE

## 2021-10-29 RX ORDER — CABERGOLINE 0.5 MG/1
0.25 TABLET ORAL 2 TIMES WEEKLY
Qty: 4 TABLET | Refills: 5 | Status: SHIPPED | OUTPATIENT
Start: 2021-10-29

## 2021-11-02 ENCOUNTER — APPOINTMENT (OUTPATIENT)
Dept: CT IMAGING | Age: 46
End: 2021-11-02
Attending: EMERGENCY MEDICINE
Payer: COMMERCIAL

## 2021-11-02 ENCOUNTER — HOSPITAL ENCOUNTER (EMERGENCY)
Age: 46
Discharge: HOME | End: 2021-11-02
Attending: EMERGENCY MEDICINE
Payer: COMMERCIAL

## 2021-11-02 VITALS
TEMPERATURE: 97 F | HEART RATE: 83 BPM | SYSTOLIC BLOOD PRESSURE: 170 MMHG | WEIGHT: 315 LBS | BODY MASS INDEX: 46.65 KG/M2 | HEIGHT: 69 IN | OXYGEN SATURATION: 98 % | RESPIRATION RATE: 24 BRPM | DIASTOLIC BLOOD PRESSURE: 92 MMHG

## 2021-11-02 DIAGNOSIS — R51.9 NONINTRACTABLE HEADACHE, UNSPECIFIED CHRONICITY PATTERN, UNSPECIFIED HEADACHE TYPE: Primary | ICD-10-CM

## 2021-11-02 LAB
ALBUMIN SERPL-MCNC: 3.3 G/DL (ref 3.5–5)
ALBUMIN/GLOB SERPL: 0.9 {RATIO} (ref 1.1–2.2)
ALP SERPL-CCNC: 66 U/L (ref 45–117)
ALT SERPL-CCNC: 37 U/L (ref 12–78)
ANION GAP SERPL CALC-SCNC: 8 MMOL/L (ref 5–15)
AST SERPL W P-5'-P-CCNC: 29 U/L (ref 15–37)
BASOPHILS # BLD: 0 K/UL (ref 0–0.1)
BASOPHILS NFR BLD: 0 % (ref 0–1)
BILIRUB SERPL-MCNC: 0.4 MG/DL (ref 0.2–1)
BUN SERPL-MCNC: 12 MG/DL (ref 6–20)
BUN/CREAT SERPL: 14 (ref 12–20)
CA-I BLD-MCNC: 8.6 MG/DL (ref 8.5–10.1)
CHLORIDE SERPL-SCNC: 100 MMOL/L (ref 97–108)
CO2 SERPL-SCNC: 26 MMOL/L (ref 21–32)
CREAT SERPL-MCNC: 0.86 MG/DL (ref 0.7–1.3)
DIFFERENTIAL METHOD BLD: NORMAL
EOSINOPHIL # BLD: 0.2 K/UL (ref 0–0.4)
EOSINOPHIL NFR BLD: 3 % (ref 0–7)
ERYTHROCYTE [DISTWIDTH] IN BLOOD BY AUTOMATED COUNT: 14.3 % (ref 11.5–14.5)
GLOBULIN SER CALC-MCNC: 3.8 G/DL (ref 2–4)
GLUCOSE SERPL-MCNC: 186 MG/DL (ref 65–100)
HCT VFR BLD AUTO: 42.7 % (ref 36.6–50.3)
HGB BLD-MCNC: 14.1 G/DL (ref 12.1–17)
IMM GRANULOCYTES # BLD AUTO: 0 K/UL (ref 0–0.04)
IMM GRANULOCYTES NFR BLD AUTO: 0 % (ref 0–0.5)
LYMPHOCYTES # BLD: 2.2 K/UL (ref 0.8–3.5)
LYMPHOCYTES NFR BLD: 30 % (ref 12–49)
MCH RBC QN AUTO: 27.7 PG (ref 26–34)
MCHC RBC AUTO-ENTMCNC: 33 G/DL (ref 30–36.5)
MCV RBC AUTO: 83.9 FL (ref 80–99)
MONOCYTES # BLD: 0.6 K/UL (ref 0–1)
MONOCYTES NFR BLD: 9 % (ref 5–13)
NEUTS SEG # BLD: 4.2 K/UL (ref 1.8–8)
NEUTS SEG NFR BLD: 58 % (ref 32–75)
PLATELET # BLD AUTO: 266 K/UL (ref 150–400)
PMV BLD AUTO: 8.9 FL (ref 8.9–12.9)
POTASSIUM SERPL-SCNC: 4.7 MMOL/L (ref 3.5–5.1)
PROT SERPL-MCNC: 7.1 G/DL (ref 6.4–8.2)
RBC # BLD AUTO: 5.09 M/UL (ref 4.1–5.7)
SODIUM SERPL-SCNC: 134 MMOL/L (ref 136–145)
WBC # BLD AUTO: 7.2 K/UL (ref 4.1–11.1)

## 2021-11-02 PROCEDURE — 70450 CT HEAD/BRAIN W/O DYE: CPT

## 2021-11-02 PROCEDURE — 74011636637 HC RX REV CODE- 636/637: Performed by: EMERGENCY MEDICINE

## 2021-11-02 PROCEDURE — 80053 COMPREHEN METABOLIC PANEL: CPT

## 2021-11-02 PROCEDURE — 36415 COLL VENOUS BLD VENIPUNCTURE: CPT

## 2021-11-02 PROCEDURE — 85025 COMPLETE CBC W/AUTO DIFF WBC: CPT

## 2021-11-02 PROCEDURE — 99282 EMERGENCY DEPT VISIT SF MDM: CPT

## 2021-11-02 PROCEDURE — 96372 THER/PROPH/DIAG INJ SC/IM: CPT

## 2021-11-02 RX ORDER — TESTOSTERONE CYPIONATE 200 MG/ML
INJECTION INTRAMUSCULAR
Qty: 6 ML | Refills: 4 | Status: SHIPPED | OUTPATIENT
Start: 2021-11-02

## 2021-11-02 RX ORDER — BUMETANIDE 1 MG/1
TABLET ORAL
COMMUNITY
Start: 2021-10-29

## 2021-11-02 RX ORDER — SUMATRIPTAN 6 MG/.5ML
6 INJECTION, SOLUTION SUBCUTANEOUS
Status: COMPLETED | OUTPATIENT
Start: 2021-11-02 | End: 2021-11-02

## 2021-11-02 RX ADMIN — SUMATRIPTAN 6 MG: 6 INJECTION, SOLUTION SUBCUTANEOUS at 06:40

## 2021-11-02 NOTE — ED PROVIDER NOTES
EMERGENCY DEPARTMENT HISTORY AND PHYSICAL EXAM      Date: 11/2/2021  Patient Name: Jose Nguyen    History of Presenting Illness     Chief Complaint   Patient presents with    Headache    Vomiting       History Provided By: Patient    HPI: Jose Nguyen, 51 y.o. male with a past medical history significant diabetes, hypertension and Prolactinoma presents to the ED with cc of right-sided retro-orbital headache for the last 2 days. Patient states that his typical headache is generally bifrontal with associated photophobia. Patient reports no recent head injury. He is endorsing some nausea and vomiting. He reports no abdominal pain, diarrhea, constipation. Of note, patient does have a history of prolactinoma currently on cabergoline. Patient reports no visual changes. He has been taking over-the-counter medication with minimal relief of his symptoms. There are no other complaints, changes, or physical findings at this time. PCP: Camila Bai NP    No current facility-administered medications on file prior to encounter. Current Outpatient Medications on File Prior to Encounter   Medication Sig Dispense Refill    cabergoline (DOSTINEX) 0.5 mg tablet Take 0.5 Tablets by mouth two (2) times a week. 4 Tablet 5    insulin glargine U-300 conc (Toujeo SoloStar U-300 Insulin) 300 unit/mL (1.5 mL) inpn pen 75 Units by SubCUTAneous route two (2) times a day. 90 mL 3    spironolactone (ALDACTONE) 25 mg tablet Take 1 Tablet by mouth daily. 30 Tablet 12    empagliflozin (Jardiance) 10 mg tablet Take 1 Tablet by mouth daily. 90 Tablet 0    DULoxetine (CYMBALTA) 60 mg capsule Take 1 Capsule by mouth daily.  90 Capsule 1    testosterone cypionate (DEPOTESTOTERONE CYPIONATE) 200 mg/mL injection INJECT 1.25 ML INTO THE MUSCLE ONCE EVERY 2 WEEKS 6 mL 4    insulin lispro (HumaLOG KwikPen Insulin) 200 unit/mL (3 mL) inpn Inject 40 - 50 units before meals w/SSI Max units daily: 185 30 mL 11    losartan-hydroCHLOROthiazide (HYZAAR) 100-25 mg per tablet Take 1 Tab by mouth daily. Stop Prinzide 90 Tab 3    bumetanide (BUMEX) 1 mg tablet          Past History     Past Medical History:  Past Medical History:   Diagnosis Date    Anxiety     Arthritis     [facility]     DM (diabetes mellitus) ([facility]     GERD (gastroesophageal reflux disease)     HTN (hypertension)     Hyperprolactinemia (HCC)     Hypogonadotropic hypogonadism (HCC)     Obesity, morbid ([address]     TROY on CPAP     Pituitary macroadenoma [facility]) December 2014    Prolactinoma [facility])        Past Surgical History:  Past Surgical History:   Procedure Laterality Date    HX COLONOSCOPY  7/14    HX WISDOM TEETH EXTRACTION         Family History:  Family History   Problem Relation Age of Onset    Hypertension Mother     Diabetes Father     Hypertension Brother     Diabetes Maternal Grandfather     Diabetes Paternal Grandfather        Social History:  Social History     Tobacco Use    Smoking status: Never Smoker    Smokeless tobacco: Never Used   Vaping Use    Vaping Use: Never used   Substance Use Topics    Alcohol use: Yes     Comment: social    Drug use: No       Allergies: Allergies   Allergen Reactions    Amoxicillin Hives    Erythromycin Other (comments)     Stomach cramps    Penicillins Hives    Sulfa (Sulfonamide Antibiotics) Hives         Review of Systems     Review of Systems   Constitutional: Negative for chills and fever. HENT: Negative for congestion, hearing loss, rhinorrhea and tinnitus. Eyes: Negative for photophobia and visual disturbance. Respiratory: Negative for cough and shortness of breath. Cardiovascular: Negative for chest pain and palpitations. Gastrointestinal: Negative for abdominal pain, diarrhea, nausea and vomiting. Endocrine: Negative for cold intolerance and heat intolerance. Genitourinary: Negative for difficulty urinating and dysuria.    Musculoskeletal: Negative for arthralgias and myalgias. Skin: Negative for color change and rash. Allergic/Immunologic: Negative for environmental allergies and food allergies. Neurological: Positive for headaches. Negative for dizziness, tremors, seizures, syncope, facial asymmetry, speech difficulty, weakness, light-headedness and numbness. Hematological: Negative for adenopathy. Does not bruise/bleed easily. Psychiatric/Behavioral: Negative for agitation and behavioral problems. Physical Exam     Physical Exam  Constitutional:       General: He is not in acute distress. Appearance: Normal appearance. He is not ill-appearing. HENT:      Head: Normocephalic and atraumatic. Right Ear: External ear normal.      Left Ear: External ear normal.      Nose: Nose normal.      Mouth/Throat:      Mouth: Mucous membranes are moist.   Eyes:      Extraocular Movements: Extraocular movements intact. Conjunctiva/sclera: Conjunctivae normal.      Pupils: Pupils are equal, round, and reactive to light. Cardiovascular:      Rate and Rhythm: Normal rate and regular rhythm. Pulses: Normal pulses. Pulmonary:      Effort: Pulmonary effort is normal. No respiratory distress. Breath sounds: Normal breath sounds. Abdominal:      General: Abdomen is flat. There is no distension. Musculoskeletal:         General: Normal range of motion. Cervical back: Normal range of motion. Skin:     General: Skin is warm and dry. Neurological:      General: No focal deficit present. Mental Status: He is alert and oriented to person, place, and time. Cranial Nerves: No cranial nerve deficit. Sensory: No sensory deficit. Motor: No weakness. Coordination: Coordination normal.      Gait: Gait normal.      Deep Tendon Reflexes: Reflexes normal.   Psychiatric:         Mood and Affect: Mood normal.         Behavior: Behavior normal.         Thought Content:  Thought content normal.         Judgment: Judgment normal.         Lab and Diagnostic Study Results     Labs -   No results found for this or any previous visit (from the past 12 hour(s)). Radiologic Studies -   @lastxrresult@  CT Results  (Last 48 hours)    None        CXR Results  (Last 48 hours)    None            Medical Decision Making   - I am the first provider for this patient. - I reviewed the vital signs, available nursing notes, past medical history, past surgical history, family history and social history. - Initial assessment performed. The patients presenting problems have been discussed, and they are in agreement with the care plan formulated and outlined with them. I have encouraged them to ask questions as they arise throughout their visit. Vital Signs-Reviewed the patient's vital signs. Patient Vitals for the past 12 hrs:   Temp Pulse Resp BP SpO2   11/02/21 0607 97 °F (36.1 °C) 83 24 (!) 170/92 98 %       Records Reviewed: Nursing Notes    The patient presents with headache with a differential diagnosis of  cerebral hemorrhage, cluster headache, ICH, migraine, tension headache and intracranial mass      ED Course:     ED Course as of Nov 02 2234   Tue Nov 02, 2021   7392 Patient with subjective improvement of his symptoms with Imitrex    [RS]   0718 Received signout from Dr. Huy Alrdidge Briefly, 46M w/migraines here w/new retroorbital HA. Pending labs and CT Head for dispo. [YA]   [id number] Electrolytes unremarkable, CT Head negative. Will discharge with return precautions. [YA]      ED Course User Index  [RS] Linda Keita DO  [YA] Fernando Farmer MD       Provider Notes (Medical Decision Making):   Patient is a 69-year-old male with past medical history significant for prolactinoma who presents to the emergency department for evaluation of right-sided retro-orbital headache for the last 2 days. Patient endorses some associated nausea and vomiting. On physical examination, patient without focal neurologic deficits.     Noncon CT head as well as CMP and CBC were obtained. Patient with subjective improvement of his symptoms with subcutaneous sumatriptan. Signout given to oncoming physician pending results of the above. MDM       Procedures   Medical Decision Makingedical Decision Making  Performed by: Yessica Le DO  Procedures       Disposition   Disposition: Condition stable and improved  DC- Adult Discharges: All of the diagnostic tests were reviewed and questions answered. Diagnosis, care plan and treatment options were discussed. The patient understands the instructions and will follow up as directed. The patients results have been reviewed with them. They have been counseled regarding their diagnosis. The patient verbally convey understanding and agreement of the signs, symptoms, diagnosis, treatment and prognosis and additionally agrees to follow up as recommended with their PCP in 24 - 48 hours. They also agree with the care-plan and convey that all of their questions have been answered. I have also put together some discharge instructions for them that include: 1) educational information regarding their diagnosis, 2) how to care for their diagnosis at home, as well a 3) list of reasons why they would want to return to the ED prior to their follow-up appointment, should their condition change. DC-The patient was given verbal headache instructions        DISCHARGE PLAN:  1. Current Discharge Medication List      CONTINUE these medications which have NOT CHANGED    Details   cabergoline (DOSTINEX) 0.5 mg tablet Take 0.5 Tablets by mouth two (2) times a week. Qty: 4 Tablet, Refills: 5    Associated Diagnoses: Hyperprolactinemia ([address] Pituitary macroadenoma ([address] Hypogonadism male      insulin glargine U-300 conc (Toujeo SoloStar U-300 Insulin) 300 unit/mL (1.5 mL) inpn pen 75 Units by SubCUTAneous route two (2) times a day.   Qty: 90 mL, Refills: 3    Associated Diagnoses: Type 2 diabetes mellitus with hyperglycemia, with long-term current use of insulin ([facility])      spironolactone (ALDACTONE) 25 mg tablet Take 1 Tablet by mouth daily. Qty: 30 Tablet, Refills: 12      empagliflozin (Jardiance) 10 mg tablet Take 1 Tablet by mouth daily. Qty: 90 Tablet, Refills: 0    Associated Diagnoses: Type 2 diabetes mellitus with hyperglycemia, with long-term current use of insulin ([facility])      DULoxetine (CYMBALTA) 60 mg capsule Take 1 Capsule by mouth daily. Qty: 90 Capsule, Refills: 1    Associated Diagnoses: Moderate episode of recurrent major depressive disorder ([facility]); SHIMON (generalized anxiety disorder)      testosterone cypionate (DEPOTESTOTERONE CYPIONATE) 200 mg/mL injection INJECT 1.25 ML INTO THE MUSCLE ONCE EVERY 2 WEEKS  Qty: 6 mL, Refills: 4    Associated Diagnoses: Type 2 diabetes mellitus with hyperglycemia, with long-term current use of insulin ([address] Hypogonadism male; Hyperprolactinemia ([address] TROY on CPAP; Pituitary macroadenoma ([facility])      insulin lispro (HumaLOG KwikPen Insulin) 200 unit/mL (3 mL) inpn Inject 40 - 50 units before meals w/SSI Max units daily: 185  Qty: 30 mL, Refills: 11    Associated Diagnoses: Type 2 diabetes mellitus with hyperglycemia, with long-term current use of insulin ([address] Essential hypertension      losartan-hydroCHLOROthiazide (HYZAAR) 100-25 mg per tablet Take 1 Tab by mouth daily. Stop Prinzide  Qty: 90 Tab, Refills: 3    Associated Diagnoses: Type 2 diabetes mellitus with hyperglycemia, with long-term current use of insulin ([address]  Essential hypertension      bumetanide (BUMEX) 1 mg tablet            2.   Follow-up Information     Follow up With Specialties Details Why Contact Info    [facility] Emergency Medicine  As needed, If symptoms worsen [address] [place] [id number] [phone]    Eros Simons NP Nurse Practitioner Schedule an appointment as soon as possible for a visit   [address]  [id number] [place] [address] [address] [facility]  3.  Return to ED if worse   4. Current Discharge Medication List            Diagnosis     Clinical Impression:   1. Nonintractable headache, unspecified chronicity pattern, unspecified headache type        Attestations:    Renu Go, DO    Please note that this dictation was completed with [employer], the computer voice recognition software. Quite often unanticipated grammatical, syntax, homophones, and other interpretive errors are inadvertently transcribed by the computer software. Please disregard these errors. Please excuse any errors that have escaped final proofreading. Thank you.

## 2021-11-02 NOTE — DISCHARGE INSTRUCTIONS
Thank you! Thank you for allowing me to care for you in the emergency department. I sincerely hope that you are satisfied with your visit today. It is my goal to provide you with excellent care. Below you will find a list of your labs and imaging from your visit today. Should you have any questions regarding these results please do not hesitate to call the emergency department. Labs -     Recent Results (from the past 12 hour(s))   CBC WITH AUTOMATED DIFF    Collection Time: 11/02/21  6:40 AM   Result Value Ref Range    WBC 7.2 4.1 - 11.1 K/uL    RBC 5.09 4. 10 - 5.70 M/uL    HGB 14.1 12.1 - 17.0 g/dL    HCT 42.7 36.6 - 50.3 %    MCV 83.9 80.0 - 99.0 FL    MCH 27.7 26.0 - 34.0 PG    MCHC 33.0 30.0 - 36.5 g/dL    RDW 14.3 11.5 - 14.5 %    PLATELET 137 375 - 398 K/uL    MPV 8.9 8.9 - 12.9 FL    NEUTROPHILS 58 32 - 75 %    LYMPHOCYTES 30 12 - 49 %    MONOCYTES 9 5 - 13 %    EOSINOPHILS 3 0 - 7 %    BASOPHILS 0 0 - 1 %    IMMATURE GRANULOCYTES 0 0.0 - 0.5 %    ABS. NEUTROPHILS 4.2 1.8 - 8.0 K/UL    ABS. LYMPHOCYTES 2.2 0.8 - 3.5 K/UL    ABS. MONOCYTES 0.6 0.0 - 1.0 K/UL    ABS. EOSINOPHILS 0.2 0.0 - 0.4 K/UL    ABS. BASOPHILS 0.0 0.0 - 0.1 K/UL    ABS. IMM. GRANS. 0.0 0.00 - 0.04 K/UL    DF AUTOMATED         Radiologic Studies -   CT HEAD WO CONT    (Results Pending)     CT Results  (Last 48 hours)      None          CXR Results  (Last 48 hours)      None               If you feel that you have not received excellent quality care or timely care, please ask to speak to the nurse manager. Please choose us in the future for your continued health care needs. ------------------------------------------------------------------------------------------------------------  The exam and treatment you received in the Emergency Department were for an urgent problem and are not intended as complete care.  It is important that you follow-up with a doctor, nurse practitioner, or physician assistant to:  (1) confirm your diagnosis,  (2) re-evaluation of changes in your illness and treatment, and  (3) for ongoing care. If your symptoms become worse or you do not improve as expected and you are unable to reach your usual health care provider, you should return to the Emergency Department. We are available 24 hours a day. Please take your discharge instructions with you when you go to your follow-up appointment. If you have any problem arranging a follow-up appointment, contact the Emergency Department immediately. If a prescription has been provided, please have it filled as soon as possible to prevent a delay in treatment. Read the entire medication instruction sheet provided to you by the pharmacy. If you have any questions or reservations about taking the medication due to side effects or interactions with other medications, please call your primary care physician or contact the ER to speak with the charge nurse. Make an appointment with your family doctor or the physician you were referred to for follow-up of this visit as instructed on your discharge paperwork, as this is a mandatory follow-up. Return to the ER if you are unable to be seen or if you are unable to be seen in a timely manner. If you have any problem arranging the follow-up visit, contact the Emergency Department immediately.

## 2021-11-02 NOTE — LETTER
[address] [place] [address] 
[phone] Work/School Note Date: 11/2/2021 To Whom It May concern: 
 
Xochilt Berrios was seen and treated today in the emergency room by the following provider(s): 
Attending Provider: Chao Rice  May return to work 11/4/2021 Sincerely, Billy Lundborg, RN

## 2021-11-03 ENCOUNTER — VIRTUAL VISIT (OUTPATIENT)
Dept: FAMILY MEDICINE CLINIC | Age: 46
End: 2021-11-03
Payer: COMMERCIAL

## 2021-11-03 ENCOUNTER — NURSE TRIAGE (OUTPATIENT)
Dept: OTHER | Facility: CLINIC | Age: 46
End: 2021-11-03

## 2021-11-03 DIAGNOSIS — Z79.4 TYPE 2 DIABETES MELLITUS WITH HYPERGLYCEMIA, WITH LONG-TERM CURRENT USE OF INSULIN (HCC): ICD-10-CM

## 2021-11-03 DIAGNOSIS — G43.009 MIGRAINE WITHOUT AURA AND WITHOUT STATUS MIGRAINOSUS, NOT INTRACTABLE: Primary | ICD-10-CM

## 2021-11-03 DIAGNOSIS — E11.65 TYPE 2 DIABETES MELLITUS WITH HYPERGLYCEMIA, WITH LONG-TERM CURRENT USE OF INSULIN (HCC): ICD-10-CM

## 2021-11-03 PROCEDURE — 3052F HG A1C>EQUAL 8.0%<EQUAL 9.0%: CPT | Performed by: NURSE PRACTITIONER

## 2021-11-03 PROCEDURE — 99214 OFFICE O/P EST MOD 30 MIN: CPT | Performed by: NURSE PRACTITIONER

## 2021-11-03 RX ORDER — SUMATRIPTAN 100 MG/1
TABLET, FILM COATED ORAL
Qty: 12 TABLET | Refills: 2 | Status: SHIPPED | OUTPATIENT
Start: 2021-11-03

## 2021-11-03 NOTE — PROGRESS NOTES
Balaji Cisneros is a 51 y.o. male who was seen by synchronous (real-time) audio-video technology on 11/3/2021 for Headache (reports is different then the usual migraines he has, ER yesterday)        Assessment & Plan:   Diagnoses and all orders for this visit:    1. Migraine without aura and without status migrainosus, not intractable  Add rx  Patient to let me know if headaches are frequent occurrence so that he can be referred for further eval with neurology- he agrees to do this. -     SUMAtriptan (IMITREX) 100 mg tablet; Take by mouth at onset of migraine, no more than 1 dose in 24 hr period. 2. Type 2 diabetes mellitus with hyperglycemia, with long-term current use of insulin ([address]  Above goal at last check, overdue for repeat a1c  Encouraged patient to make f/u appt with endocrine or here for labs  Encouraged improved compliance with diet and exercise recommendations  Recommended weight loss      Follow-up and Dispositions    · Return in about 4 weeks (around 12/1/2021), or if symptoms worsen or fail to improve, for diabetes, headache. I have discussed the diagnosis with the patient and the intended plan as seen in the above orders, and questions were answered concerning future plans. Patient conveyed understanding of the plan at the time of the visit. 712  Subjective:     HPI:    Presents for evaluation of migraine headache. C/o migraine headache yesterday, different than his usual pattern, with unilateral throbbing pain behind the right eye, associated with nausea and vomiting. Rated pain 9/10, went to ED at [facility] for eval. Review of records reveals CT was negative for acute bleed. Reports pain improved to a dull ache about 30 min after receiving imitrex injection and he was discharged home. Reports waking with recurrence of same headache at 4 am this morning.  Tylenol and promethazine which are usually helpful for his migraines did not provided relief, but he has noted gradual disappation of headache and nausea over the course of the morning, now rates pain at 1/10 right retroorbital.  He is requesting rx for imitrex so that he will not have to return to ED if pain recurs. He does have prolactinoma which is monitored by his endocrinologist.   Reports he was able to get empagliflozin following approval of prior auth, monitoring sugar regularly, has come down to low 200's compared to 300's before. Compliance has not been consistent with recommended low carb diet. He missed his last appt with endocrine and has not rescheduled. Prior to Admission medications    Medication Sig Start Date End Date Taking? Authorizing Provider   SUMAtriptan (IMITREX) 100 mg tablet Take by mouth at onset of migraine, no more than 1 dose in 24 hr period. 11/3/21  Yes Sherman Maloney NP   testosterone cypionate (DEPOTESTOTERONE CYPIONATE) 200 mg/mL injection INJECT 1.25ML INTO THE MUSCLE ONCE EVERY 2 WEEKS 11/2/21  Yes June Borden MD   bumetanide (BUMEX) 1 mg tablet  10/29/21  Yes OtherNamita MD   cabergoline (DOSTINEX) 0.5 mg tablet Take 0.5 Tablets by mouth two (2) times a week. 10/29/21  Yes June Borden MD   insulin glargine U-300 conc (Toujeo SoloStar U-300 Insulin) 300 unit/mL (1.5 mL) inpn pen 75 Units by SubCUTAneous route two (2) times a day. 10/29/21  Yes June Borden MD   spironolactone (ALDACTONE) 25 mg tablet Take 1 Tablet by mouth daily. 8/18/21  Yes Flavia Acevedo MD   empagliflozin (Jardiance) 10 mg tablet Take 1 Tablet by mouth daily. 7/14/21  Yes Sherman Maloney NP   DULoxetine (CYMBALTA) 60 mg capsule Take 1 Capsule by mouth daily. 7/14/21  Yes Sherman Maloney NP   insulin lispro (HumaLOG KwikPen Insulin) 200 unit/mL (3 mL) inpn Inject 40 - 50 units before meals w/SSI Max units daily: 185 11/5/20  Yes June Borden MD   losartan-hydroCHLOROthiazide (HYZAAR) 100-25 mg per tablet Take 1 Tab by mouth daily.  Stop Prinzide 11/5/20  Yes June Borden MD     Patient Active Problem List   Diagnosis Code    Type 2 diabetes mellitus with retinopathy, without long-term current use of insulin ([facility] E11.319    HTN (hypertension) I10    TROY on CPAP G47.33, Z99.89    Hypogonadism male E29.1    Hyperprolactinemia (HCC) E22.1    Hyperlipidemia E78.5    Pituitary macroadenoma (HCC) D35.2    Obesity, morbid ([facility]) E66.01    Moderate episode of recurrent major depressive disorder ([facility] F33.1    SHIMON (generalized anxiety disorder) F41.1    Type 2 diabetes mellitus with hyperglycemia, with long-term current use of insulin ([facility]) E11.65, Z79.4    Chronic diastolic congestive heart failure ([facility]) I50.32     Allergies   Allergen Reactions    Amoxicillin Hives    Erythromycin Other (comments)     Stomach cramps    Penicillins Hives    Sulfa (Sulfonamide Antibiotics) Hives     Past Medical History:   Diagnosis Date    Anxiety     Arthritis     Diabetic neuropathy ([facility]     DM (diabetes mellitus) (HCC)     GERD (gastroesophageal reflux disease)     HTN (hypertension)     Hyperprolactinemia (HCC)     Hypogonadotropic hypogonadism (HCC)     Obesity, morbid ([facility]     TROY on CPAP     Pituitary macroadenoma ([facility] December 2014    Prolactinoma [facility])      Past Surgical History:   Procedure Laterality Date    HX COLONOSCOPY  7/14    HX WISDOM TEETH EXTRACTION       Family History   Problem Relation Age of Onset    Hypertension Mother     Diabetes Father     Hypertension Brother     Diabetes Maternal Grandfather     Diabetes Paternal Grandfather      Social History     Tobacco Use    Smoking status: Never Smoker    Smokeless tobacco: Never Used   Substance Use Topics    Alcohol use: Yes     Comment: social       Review of Systems   Constitutional: Negative for chills, fever, malaise/fatigue and weight loss. HENT: Negative. Eyes: Negative. Respiratory: Negative. Cardiovascular: Negative. Gastrointestinal: Positive for nausea and vomiting.  Negative for abdominal pain, constipation and diarrhea. Genitourinary: Negative. Musculoskeletal: Negative. Skin: Negative. Neurological: Positive for headaches. Endo/Heme/Allergies: Negative. Psychiatric/Behavioral: Negative. Objective:   No flowsheet data found. General: alert, cooperative, no distress   Mental  status: normal mood, behavior, speech, dress, motor activity, and thought processes, able to follow commands   HENT: NCAT   Neck: no visualized mass   Resp: no respiratory distress   Neuro: no gross deficits   Skin: no discoloration or lesions of concern on visible areas   Psychiatric: normal affect, consistent with stated mood, no evidence of hallucinations     Additional exam findings:   none    We discussed the expected course, resolution and complications of the diagnosis(es) in detail. Medication risks, benefits, costs, interactions, and alternatives were discussed as indicated. I advised him to contact the office if his condition worsens, changes or fails to improve as anticipated. He expressed understanding with the diagnosis(es) and plan. Wally Odonnell, was evaluated through a synchronous (real-time) audio-video encounter. The patient (or guardian if applicable) is aware that this is a billable service. Verbal consent to proceed has been obtained within the past 12 months. The visit was conducted pursuant to the emergency declaration under the [address] [place] [address] authority and the [employer] and [employer] General Act. Patient identification was verified, and a caregiver was present when appropriate. The patient was located in a state where the provider was credentialed to provide care.     Brigid Isaac NP  11/3/2021

## 2021-11-03 NOTE — TELEPHONE ENCOUNTER
Reason for Disposition   SEVERE headache, states 'worst headache' of life    Answer Assessment - Initial Assessment Questions  1. LOCATION: \"Where does it hurt? \"       Right side of head, temple, ear and behind eye    2. ONSET: \"When did the headache start? \" (Minutes, hours or days)       Progressively getting worse since Friday    3. PATTERN: \"Does the pain come and go, or has it been constant since it started? \"      Pretty constant,     4. SEVERITY: \"How bad is the pain? \" and \"What does it keep you from doing? \"  (e.g., Scale 1-10; mild, moderate, or severe)    - MILD (1-3): doesn't interfere with normal activities     - MODERATE (4-7): interferes with normal activities or awakens from sleep     - SEVERE (8-10): excruciating pain, unable to do any normal activities         9.5/10    5. RECURRENT SYMPTOM: \"Have you ever had headaches before? \" If so, ask: \"When was the last time? \" and \"What happened that time? \"       Yes about 6 months- usually OTC med(tylenol extended release,  And promethezine 25 mg      6. CAUSE: \"What do you think is causing the headache? \"      Migraine    7. MIGRAINE: \"Have you been diagnosed with migraine headaches? \" If so, ask: \"Is this headache similar? \"       Yes, usually they are bilateral but this one is only on one side    8. HEAD INJURY: \"Has there been any recent injury to the head? \"       Denies    9. OTHER SYMPTOMS: \"Do you have any other symptoms? \" (fever, stiff neck, eye pain, sore throat, cold symptoms)      N/V    10. PREGNANCY: \"Is there any chance you are pregnant? \" \"When was your last menstrual period? \"        no    Protocols used: HEADACHE-ADULT-[place]    Received call from [facility] with [place]. Brief description of triage: severe headache(Migraine per patient)    Triage indicates for patient to ED now or PCP office with approval.    2023- Called Debra silva, Spoke to [facility],  Provider not present at this time.  Transferred us to [facility] LPN, who would take the patient, but explained our process. [address] Family office again, spoke with Patience Screen,  She states the provider is with patients and will be all day. 5- Called patient back, will transfer to office for scheduling per patient request. Discussed risk of not returning to the ED today. Will go back to ED if headache worsens. Care advice provided, patient verbalizes understanding; denies any other questions or concerns; instructed to call back for any new or worsening symptoms. Writer provided warm transfer to Jeni at [facility] for appointment scheduling. Attention Provider: Thank you for allowing me to participate in the care of your patient. The patient was connected to triage in response to information provided to the ECC. Please do not respond through this encounter as the response is not directed to a shared pool.

## 2021-11-03 NOTE — PROGRESS NOTES
Apoorva Reina is a 51 y.o. male , id x 2(name and ). Reviewed record, history, and  medications. Chief Complaint   Patient presents with    Headache     reports is different then the usual migraines he has, ER yesterday       There were no vitals filed for this visit. Coordination of Care Questionnaire:   1) Have you been to an emergency room, urgent care, or hospitalized since your last visit? yes       2. Have seen or consulted any other health care provider since your last visit? NO      3 most recent PHQ Screens 2021   PHQ Not Done -   Little interest or pleasure in doing things Not at all   Feeling down, depressed, irritable, or hopeless Not at all   Total Score PHQ 2 0   Trouble falling or staying asleep, or sleeping too much Not at all   Feeling tired or having little energy Not at all   Poor appetite, weight loss, or overeating Not at all   Feeling bad about yourself - or that you are a failure or have let yourself or your family down Not at all   Trouble concentrating on things such as school, work, reading, or watching TV Not at all   Moving or speaking so slowly that other people could have noticed; or the opposite being so fidgety that others notice Not at all   Thoughts of being better off dead, or hurting yourself in some way Not at all   PHQ 9 Score 0   How difficult have these problems made it for you to do your work, take care of your home and get along with others Not difficult at all       Patient is accompanied by self I have received verbal consent from Apoorva Reina to discuss any/all medical information while they are present in the room.

## 2021-12-10 NOTE — LETTER
NOTIFICATION RETURN TO WORK / SCHOOL 
 
7/23/2019 1:46 PM 
 
Mr. James Knott [address] To Whom It May Concern: 
 
James Knott is currently under the care of [address] [place] [address]. He will return to work/school on: 07/24/2019. If there are questions or concerns please have the patient contact our office. Sincerely, Evgeny Bruce MD 
 
                                
 

Universal Safety Interventions

## 2022-03-18 PROBLEM — I50.32 CHRONIC DIASTOLIC CONGESTIVE HEART FAILURE (HCC): Status: ACTIVE | Noted: 2021-07-24

## 2022-03-18 PROBLEM — F33.1 MODERATE EPISODE OF RECURRENT MAJOR DEPRESSIVE DISORDER (HCC): Status: ACTIVE | Noted: 2020-03-11

## 2022-03-18 PROBLEM — E11.65 TYPE 2 DIABETES MELLITUS WITH HYPERGLYCEMIA, WITH LONG-TERM CURRENT USE OF INSULIN (HCC): Status: ACTIVE | Noted: 2020-10-29

## 2022-03-18 PROBLEM — Z79.4 TYPE 2 DIABETES MELLITUS WITH HYPERGLYCEMIA, WITH LONG-TERM CURRENT USE OF INSULIN (HCC): Status: ACTIVE | Noted: 2020-10-29

## 2022-03-19 PROBLEM — E66.01 OBESITY, MORBID (HCC): Status: ACTIVE | Noted: 2018-02-19

## 2022-03-19 PROBLEM — F41.1 GAD (GENERALIZED ANXIETY DISORDER): Status: ACTIVE | Noted: 2020-03-11

## 2022-07-22 ENCOUNTER — TELEPHONE (OUTPATIENT)
Dept: ENDOCRINOLOGY | Age: 47
End: 2022-07-22

## 2022-07-22 DIAGNOSIS — E22.1 HYPERPROLACTINEMIA (HCC): Primary | ICD-10-CM

## 2022-07-22 DIAGNOSIS — D35.2 PITUITARY MACROADENOMA (HCC): ICD-10-CM

## 2022-07-22 DIAGNOSIS — E11.65 TYPE 2 DIABETES MELLITUS WITH HYPERGLYCEMIA, WITH LONG-TERM CURRENT USE OF INSULIN (HCC): ICD-10-CM

## 2022-07-22 DIAGNOSIS — Z79.4 TYPE 2 DIABETES MELLITUS WITH HYPERGLYCEMIA, WITH LONG-TERM CURRENT USE OF INSULIN (HCC): ICD-10-CM

## 2022-07-22 DIAGNOSIS — E78.2 MIXED HYPERLIPIDEMIA: ICD-10-CM

## 2022-09-09 ENCOUNTER — OFFICE VISIT (OUTPATIENT)
Dept: FAMILY MEDICINE CLINIC | Age: 47
End: 2022-09-09

## 2022-09-09 ENCOUNTER — TELEPHONE (OUTPATIENT)
Dept: FAMILY MEDICINE CLINIC | Age: 47
End: 2022-09-09

## 2022-09-09 VITALS
WEIGHT: 312.6 LBS | SYSTOLIC BLOOD PRESSURE: 165 MMHG | DIASTOLIC BLOOD PRESSURE: 93 MMHG | TEMPERATURE: 98 F | OXYGEN SATURATION: 97 % | HEART RATE: 72 BPM | BODY MASS INDEX: 46.3 KG/M2 | HEIGHT: 69 IN

## 2022-09-09 DIAGNOSIS — F41.1 GAD (GENERALIZED ANXIETY DISORDER): ICD-10-CM

## 2022-09-09 DIAGNOSIS — E78.2 MIXED HYPERLIPIDEMIA: ICD-10-CM

## 2022-09-09 DIAGNOSIS — G47.33 OSA ON CPAP: ICD-10-CM

## 2022-09-09 DIAGNOSIS — Z79.4 TYPE 2 DIABETES MELLITUS WITH HYPERGLYCEMIA, WITH LONG-TERM CURRENT USE OF INSULIN (HCC): Primary | ICD-10-CM

## 2022-09-09 DIAGNOSIS — I10 ESSENTIAL HYPERTENSION: ICD-10-CM

## 2022-09-09 DIAGNOSIS — E11.319 DIABETIC RETINOPATHY OF BOTH EYES ASSOCIATED WITH TYPE 2 DIABETES MELLITUS, MACULAR EDEMA PRESENCE UNSPECIFIED, UNSPECIFIED RETINOPATHY SEVERITY (HCC): ICD-10-CM

## 2022-09-09 DIAGNOSIS — Z99.89 OSA ON CPAP: ICD-10-CM

## 2022-09-09 DIAGNOSIS — E11.65 TYPE 2 DIABETES MELLITUS WITH HYPERGLYCEMIA, WITH LONG-TERM CURRENT USE OF INSULIN (HCC): Primary | ICD-10-CM

## 2022-09-09 DIAGNOSIS — I50.32 CHRONIC DIASTOLIC CONGESTIVE HEART FAILURE (HCC): ICD-10-CM

## 2022-09-09 DIAGNOSIS — F33.1 MODERATE EPISODE OF RECURRENT MAJOR DEPRESSIVE DISORDER (HCC): ICD-10-CM

## 2022-09-09 PROCEDURE — 99214 OFFICE O/P EST MOD 30 MIN: CPT | Performed by: NURSE PRACTITIONER

## 2022-09-09 RX ORDER — DULOXETIN HYDROCHLORIDE 60 MG/1
60 CAPSULE, DELAYED RELEASE ORAL DAILY
Qty: 90 CAPSULE | Refills: 0 | Status: SHIPPED | OUTPATIENT
Start: 2022-09-09

## 2022-09-09 RX ORDER — FLASH GLUCOSE SENSOR
KIT MISCELLANEOUS
Qty: 6 KIT | Refills: 1 | Status: SHIPPED | OUTPATIENT
Start: 2022-09-09

## 2022-09-09 RX ORDER — BLOOD-GLUCOSE TRANSMITTER
EACH MISCELLANEOUS
Qty: 1 EACH | Refills: 0 | Status: SHIPPED | OUTPATIENT
Start: 2022-09-09

## 2022-09-09 RX ORDER — SPIRONOLACTONE 25 MG/1
25 TABLET ORAL DAILY
Qty: 90 TABLET | Refills: 0 | Status: SHIPPED | OUTPATIENT
Start: 2022-09-09

## 2022-09-09 RX ORDER — BLOOD-GLUCOSE SENSOR
EACH MISCELLANEOUS
Qty: 1 EACH | Refills: 0 | Status: SHIPPED | OUTPATIENT
Start: 2022-09-09

## 2022-09-09 RX ORDER — LOSARTAN POTASSIUM AND HYDROCHLOROTHIAZIDE 25; 100 MG/1; MG/1
1 TABLET ORAL DAILY
Qty: 90 TABLET | Refills: 0 | Status: SHIPPED | OUTPATIENT
Start: 2022-09-09

## 2022-09-09 NOTE — PROGRESS NOTES
Andreina Shannon is a 49 y.o. male , id x 2(name and ). Reviewed record, history, and  medications. Chief Complaint   Patient presents with    Physical     Is not fasting- had a breakfast bowl from [facility]. Is not on any meds currently     Flank Injury     1-4/10 depending on movement, believes he pulled a muscle       Vitals:    22 0813   BP: (!) 165/93   Pulse: 72   Temp: 98 °F (36.7 °C)   SpO2: 97%   Weight: 312 lb 9.6 oz (141.8 kg)   Height: 5' 9\" (1.753 m)       Coordination of Care Questionnaire:   1. Have you been to the ER, urgent care clinic since your last visit? Hospitalized since your last visit? No    2. Have you seen or consulted any other health care providers outside of the [address] [place] [address] since your last visit? Include any pap smears or colon screening. No      3 most recent PHQ Screens 2022   PHQ Not Done -   Little interest or pleasure in doing things Not at all   Feeling down, depressed, irritable, or hopeless Not at all   Total Score PHQ 2 0   Trouble falling or staying asleep, or sleeping too much -   Feeling tired or having little energy -   Poor appetite, weight loss, or overeating -   Feeling bad about yourself - or that you are a failure or have let yourself or your family down -   Trouble concentrating on things such as school, work, reading, or watching TV -   Moving or speaking so slowly that other people could have noticed; or the opposite being so fidgety that others notice -   Thoughts of being better off dead, or hurting yourself in some way -   PHQ 9 Score -   How difficult have these problems made it for you to do your work, take care of your home and get along with others -       Patient is accompanied by self I have received verbal consent from Andreina Shannon to discuss any/all medical information while they are present in the room. Andreina Shannon is a 49 y.o. male , id x 2(name and ). Reviewed record, history, and  medications.     Chief Complaint Patient presents with    Physical     Is not fasting- had a breakfast bowl from [facility]. Is not on any meds currently     Flank Injury     1-4/10 depending on movement, believes he pulled a muscle       Vitals:    09/09/22 0813   BP: (!) 165/93   Pulse: 72   Temp: 98 °F (36.7 °C)   SpO2: 97%   Weight: 312 lb 9.6 oz (141.8 kg)   Height: 5' 9\" (1.753 m)       Coordination of Care Questionnaire:   1. Have you been to the ER, urgent care clinic since your last visit? Hospitalized since your last visit? No    2. Have you seen or consulted any other health care providers outside of the [facility] [place] [address] since your last visit? Include any pap smears or colon screening. No      3 most recent PHQ Screens 9/9/2022   PHQ Not Done -   Little interest or pleasure in doing things Not at all   Feeling down, depressed, irritable, or hopeless Not at all   Total Score PHQ 2 0   Trouble falling or staying asleep, or sleeping too much -   Feeling tired or having little energy -   Poor appetite, weight loss, or overeating -   Feeling bad about yourself - or that you are a failure or have let yourself or your family down -   Trouble concentrating on things such as school, work, reading, or watching TV -   Moving or speaking so slowly that other people could have noticed; or the opposite being so fidgety that others notice -   Thoughts of being better off dead, or hurting yourself in some way -   PHQ 9 Score -   How difficult have these problems made it for you to do your work, take care of your home and get along with others -       Patient is accompanied by self I have received verbal consent from Sharyn Sheldon to discuss any/all medical information while they are present in the room.

## 2022-09-09 NOTE — PROGRESS NOTES
Subjective:     Navi Smith is a 49 y.o. male seen for follow up of diabetes, hypertension, hyperlipidemia, and obesity. Patient reports he has been out of all of his medicines since January. He has not been seen here or by his endocrinologist in almost a year. He states his insurance changed in January and his medications were no longer covered. He has not been monitoring his blood sugar as he no longer has coverage for the Dexcom. He is previously using the G6. He is asking that I send a prescription for a 2-week trial.  He does not believe he has coverage for the freestyle toshia either. He has scheduled an appointment with Dr. Saintclair Griffith for November 1. He feels his depression has worsened off of duloxetine and is part of the reason why he has not been taking care of his chronic conditions. Denies thoughts of harming self or others. Diabetic Review of Systems - medication compliance: has not taken any of his medications for the past 9 months, diabetic diet compliance: noncompliant much of the time, home glucose monitoring: is not performed, further diabetic ROS: no polyuria or polydipsia, no chest pain, dyspnea or TIA's, no unusual visual symptoms, no hypoglycemia, weight has decreased, has dysesthesias in the feet, last eye exam approximately 1 year ago, acute symptoms are none. Cardiovascular risk analysis - LDL goal is under 70  diabetic  hypertension  hyperlipidemia  obese  sedentary lifestyle. Compliance with treatment thus far has been good.      Diet and Lifestyle: not attempting to follow a low fat, low cholesterol diet, not attempting to follow a low sodium diet, does not rigorously follow a diabetic diet, sedentary, nonsmoker  Home BP Monitoring: is not measured at home    Cardiovascular ROS: not taking medications regularly as instructed, no medication side effects noted, no TIA's, no chest pain on exertion, no dyspnea on exertion, no swelling of ankles, no orthostatic dizziness or lightheadedness, no orthopnea or paroxysmal nocturnal dyspnea, no palpitations, no intermittent claudication symptoms. Other symptoms and concerns: none.     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    Obesity, morbid (HCC) E66.01    Moderate episode of recurrent major depressive disorder ([facility] F33.1    SHIMON (generalized anxiety disorder) F41.1    Type 2 diabetes mellitus with hyperglycemia, with long-term current use of insulin ([facility]) E11.65, Z79.4    Chronic diastolic congestive heart failure ([facility]) I50.32     Allergies   Allergen Reactions    Amoxicillin Hives    Erythromycin Other (comments)     Stomach cramps    Penicillins Hives    Sulfa (Sulfonamide Antibiotics) Hives     Past Medical History:   Diagnosis Date    Anxiety     Arthritis     COVID-19 01/2022    Diabetic neuropathy (HCC)     DM (diabetes mellitus) ([facility]     GERD (gastroesophageal reflux disease)     HTN (hypertension)     Hyperprolactinemia (HCC)     Hypogonadotropic hypogonadism (HCC)     Obesity, morbid (HCC)     TROY on CPAP     Pituitary macroadenoma ([facility] 12/2014    Prolactinoma ([facility]      Past Surgical History:   Procedure Laterality Date    HX COLONOSCOPY  7/14    HX WISDOM TEETH EXTRACTION       Family History   Problem Relation Age of Onset    Hypertension Mother     Diabetes Father     Hypertension Brother     Diabetes Maternal Grandfather     Diabetes Paternal Grandfather      Social History     Tobacco Use    Smoking status: Never    Smokeless tobacco: Never   Substance Use Topics    Alcohol use: Yes     Comment: social        Lab Results   Component Value Date/Time    WBC 7.2 11/02/2021 06:40 AM    HGB 14.1 11/02/2021 06:40 AM    HCT 42.7 11/02/2021 06:40 AM    PLATELET 259 84/77/3328 06:40 AM    MCV 83.9 11/02/2021 06:40 AM     Lab Results   Component Value Date/Time    Hemoglobin A1c 8.8 (H) 06/09/2021 12:00 AM    Hemoglobin A1c 9.6 (H) 10/26/2020 04:03 PM    Hemoglobin A1c 7.0 (H) 02/25/2020 03:39 PM    Glucose 186 (H) 11/02/2021 06:40 AM    Glucose  10/31/2019 09:36 AM    Microalbumin/Creat ratio (mg/g creat) 25 02/25/2020 03:39 PM    Microalb/Creat ratio (ug/mg creat.) 776 (H) 09/09/2022 12:00 AM    Microalbumin,urine random 4.96 02/25/2020 03:39 PM    LDL,Direct 150 (H) 10/26/2020 04:03 PM    LDL, calculated 133 (H) 04/12/2021 12:00 AM    LDL, calculated 110 (H) 02/13/2018 02:00 PM    Creatinine (POC) 1.0 11/06/2014 04:04 PM    Creatinine 0.86 11/02/2021 06:40 AM      Lab Results   Component Value Date/Time    Cholesterol, total 190 04/12/2021 12:00 AM    HDL Cholesterol 35 (L) 04/12/2021 12:00 AM    LDL,Direct 150 (H) 10/26/2020 04:03 PM    LDL, calculated 133 (H) 04/12/2021 12:00 AM    LDL, calculated 110 (H) 02/13/2018 02:00 PM    Triglyceride 121 04/12/2021 12:00 AM     Lab Results   Component Value Date/Time    ALT (SGPT) 37 11/02/2021 06:40 AM    Alk.  phosphatase 66 11/02/2021 06:40 AM    Bilirubin, total 0.4 11/02/2021 06:40 AM    Albumin 3.3 (L) 11/02/2021 06:40 AM    Protein, total 7.1 11/02/2021 06:40 AM    PLATELET 802 24/28/1060 06:40 AM       Lab Results   Component Value Date/Time    GFR est non-AA >60 11/02/2021 06:40 AM    GFRNA, POC >60 11/06/2014 04:04 PM    GFR est AA >60 11/02/2021 06:40 AM    GFRAA, POC >60 11/06/2014 04:04 PM    Creatinine 0.86 11/02/2021 06:40 AM    Creatinine (POC) 1.0 11/06/2014 04:04 PM    BUN 12 11/02/2021 06:40 AM    Sodium 134 (L) 11/02/2021 06:40 AM    Potassium 4.7 11/02/2021 06:40 AM    Chloride 100 11/02/2021 06:40 AM    CO2 26 11/02/2021 06:40 AM     Lab Results   Component Value Date/Time    TSH 1.280 09/11/2020 12:48 PM    T4, Free 1.38 01/02/2014 08:42 AM         Review of Systems  A comprehensive review of systems was negative except for that written in the HPI. Objective:     Visit Vitals  BP (!) 165/93   Pulse 72   Temp 98 °F (36.7 °C)   Ht 5' 9\" (1.753 m)   Wt 312 lb 9.6 oz (141.8 kg)   SpO2 97%   BMI 46.16 kg/m²     Physical Examination: General appearance - alert, well appearing, and in no distress and oriented to person, place, and time  Mental status - normal mood, behavior, speech, dress, motor activity, and thought processes  Eyes - sclera anicteric  Neck - supple, no significant adenopathy, thyroid exam: thyroid is normal in size without nodules or tenderness  Chest - clear to auscultation, no wheezes, rales or rhonchi, symmetric air entry  Heart - normal rate, regular rhythm, normal S1, S2, no murmurs, rubs, clicks or gallops, normal bilateral carotid upstroke without bruits, no JVD  Abdomen - soft, nontender, nondistended, no masses or organomegaly  bowel sounds normal  Neurological - alert, oriented, normal speech, no focal findings or movement disorder noted  Extremities - 1+ pedal edema noted, intact peripheral pulses, no edema, redness or tenderness in the calves or thighs  Skin - normal coloration and turgor, no rashes    Assessment/Plan:       Diabetic issues reviewed with him: low cholesterol diet, weight control and daily exercise discussed, home glucose monitoring emphasized, all medications, side effects and compliance discussed carefully, foot care discussed and Podiatry visits discussed, annual eye examinations at Ophthalmology discussed, glycohemoglobin and other lab monitoring discussed, and long term diabetic complications discussed. Diagnoses and all orders for this visit:    1.  Type 2 diabetes mellitus with hyperglycemia, with long-term current use of insulin (HCC)  Previously on high doses of long-acting and mealtime insulin as well as Jardiance, has not taken any medications in the past 9 months  38 pound weight loss has been noted since November 2021  Patient agrees to get his blood drawn in the next 2 weeks as he is not fasting today  Anticipate A1c will be uncontrolled  Patient has scheduled an endocrinology appointment in November. we will resume Jardiance today. I will reach out to his endocrinologist Dr. Lesa West for guidance on recommended dosages for resuming his insulin. He was previously taking 75 units of Toujeo twice a day and 40 to 50 units of Humalog with each meal, I am hesitant to restart at these high doses. Patient is reminded to schedule diabetic eye exam  Improved compliance with diet and exercise recommendations is encouraged  Encouraged weight loss  Patient reports he has 3 of his Dexcom G6 sensors on hand but no transmitter. He has found a for free transmitter and sensor, I will use the number he has provided to contact [facility] to send an Rx for this. Reviewed his insurance formulary with him, it appears he has some coverage for the freestyle christiano 2 system, will send Rx for this as well  -     empagliflozin (Jardiance) 10 mg tablet; Take 1 Tablet by mouth daily. -     losartan-hydroCHLOROthiazide (HYZAAR) 100-25 mg per tablet; Take 1 Tablet by mouth daily. Stop Prinzide  -     HEMOGLOBIN A1C WITH EAG; Future  -     LIPID PANEL; Future  -     MICROALBUMIN, UR, RAND W/ MICROALB/CREAT RATIO; Future  -     Dexcom G6 Transmitter ana laura; Please dispense 10 day sample. Use as directed. -     Dexcom G6 Sensor ana laura; Dispense 10 day sample. Use as directed. -     flash glucose sensor (FreeStyle Christiano 2 Sensor) kit; Use to monitor blood sugar as directed. Change sensor every 14 days. Dx: E11.65, Z79.4, E11.319    2.  Diabetic retinopathy of both eyes associated with type 2 diabetes mellitus, macular edema presence unspecified, unspecified retinopathy severity ([address]  Improved glucose control  Patient is reminded to schedule diabetic eye exam  -     flash glucose sensor (FreeStyle Christiano 2 Sensor) kit; Use to monitor blood sugar as directed. Change sensor every 14 days. Dx: E11.65, Z79.4, E11.319    3. Essential hypertension  Above goal, has not been taking any meds for the past 9 months  Restart Hyzaar at previous dose  Reassess blood pressure in 2 weeks  -     losartan-hydroCHLOROthiazide (HYZAAR) 100-25 mg per tablet; Take 1 Tablet by mouth daily.   -     METABOLIC PANEL, COMPREHENSIVE; Future  -     CBC W/O DIFF; Future  -     TSH 3RD GENERATION; Future    4. Mixed hyperlipidemia  Control unknown  Not currently on a statin  Check fasting lipids, anticipate starting atorvastatin or rosuvastatin  Weight loss recommended  150 minutes of exercise weekly recommended  Heart healthy diet recommended    5. Chronic diastolic congestive heart failure (HCC)  No acute symptoms today  Has been off of ARB, spironolactone, and bumetanide for 9 months  Encouraged heart healthy diet  Encouraged daily weight monitoring  Resume ARB  Resume spironolactone  -     NT-PRO BNP; Future    6. TROY on CPAP  Encouraged compliance with nightly CPAP    7. Moderate episode of recurrent major depressive disorder (HCC)  Symptoms worsening  Resume duloxetine  Encourage patient to establish with a psychiatrist and a counselor  -     DULoxetine (CYMBALTA) 60 mg capsule; Take 1 Capsule by mouth daily. 8. SHIMON (generalized anxiety disorder)  Symptoms worsening  Resume duloxetine  Encourage patient to establish with a psychiatrist and a counselor  -     DULoxetine (CYMBALTA) 60 mg capsule; Take 1 Capsule by mouth daily. Other orders  -     spironolactone (ALDACTONE) 25 mg tablet; Take 1 Tablet by mouth daily.  -     MICROALBUMIN, UR, RAND W/ MICROALB/CREAT RATIO      Follow-up and Dispositions    Return in about 4 weeks (around 10/7/2022), or if symptoms worsen or fail to improve, for blood pressure. I have discussed the diagnosis with the patient and the intended plan as seen in the above orders.  The patient has received an after-visit summary and questions were answered concerning future plans. Patient conveyed understanding of the plan at the time of the visit.     Dion Albrecht NP

## 2022-09-09 NOTE — TELEPHONE ENCOUNTER
Returned call to pt and ID x 3. Pt states that it is whichever is easiest for you. He is fine with just the Ruth since the Dexcom is just going to be a temporary solution however, the Dexcom will give him 3 free months of glucose monitoring. Pt states that he will leave the choice to your convenience.

## 2022-09-09 NOTE — TELEPHONE ENCOUNTER
PT would like to have a RX for the Ruth that was discussed at his appointment    CB# is [phone]

## 2022-09-10 DIAGNOSIS — E11.65 TYPE 2 DIABETES MELLITUS WITH HYPERGLYCEMIA, WITH LONG-TERM CURRENT USE OF INSULIN (HCC): Primary | ICD-10-CM

## 2022-09-10 DIAGNOSIS — Z79.4 TYPE 2 DIABETES MELLITUS WITH HYPERGLYCEMIA, WITH LONG-TERM CURRENT USE OF INSULIN (HCC): Primary | ICD-10-CM

## 2022-09-10 DIAGNOSIS — E78.2 MIXED HYPERLIPIDEMIA: ICD-10-CM

## 2022-09-10 LAB
ALBUMIN/CREAT UR: 776 MG/G CREAT (ref 0–29)
CREAT UR-MCNC: 45 MG/DL
MICROALBUMIN UR-MCNC: 349.3 UG/ML

## 2022-09-12 NOTE — PROGRESS NOTES
Your microalbumin screening of the urine is positive. This is an indicator that the diabetes has damage to your kidneys. The best thing we can do to improve this is to improve your blood sugar control and also restart your losartan. Repeat this test in 12 months. Please do not forget to have your fasting blood work drawn this week.

## 2022-09-13 LAB
ALBUMIN SERPL-MCNC: 4.3 G/DL (ref 4–5)
ALBUMIN/GLOB SERPL: 1.4 {RATIO} (ref 1.2–2.2)
ALP SERPL-CCNC: 77 IU/L (ref 44–121)
ALT SERPL-CCNC: 26 IU/L (ref 0–44)
AST SERPL-CCNC: 16 IU/L (ref 0–40)
BILIRUB SERPL-MCNC: 0.3 MG/DL (ref 0–1.2)
BUN SERPL-MCNC: 12 MG/DL (ref 6–24)
BUN/CREAT SERPL: 15 (ref 9–20)
CALCIUM SERPL-MCNC: 9.9 MG/DL (ref 8.7–10.2)
CHLORIDE SERPL-SCNC: 93 MMOL/L (ref 96–106)
CHOLEST SERPL-MCNC: 305 MG/DL (ref 100–199)
CO2 SERPL-SCNC: 24 MMOL/L (ref 20–29)
CREAT SERPL-MCNC: 0.82 MG/DL (ref 0.76–1.27)
EGFR: 108 ML/MIN/1.73
ERYTHROCYTE [DISTWIDTH] IN BLOOD BY AUTOMATED COUNT: 13 % (ref 11.6–15.4)
EST. AVERAGE GLUCOSE BLD GHB EST-MCNC: >398 MG/DL
GLOBULIN SER CALC-MCNC: 3.1 G/DL (ref 1.5–4.5)
GLUCOSE SERPL-MCNC: 396 MG/DL (ref 65–99)
HBA1C MFR BLD: >15.5 % (ref 4.8–5.6)
HCT VFR BLD AUTO: 51.9 % (ref 37.5–51)
HDLC SERPL-MCNC: 43 MG/DL
HGB BLD-MCNC: 16.4 G/DL (ref 13–17.7)
IMP & REVIEW OF LAB RESULTS: NORMAL
LDLC SERPL CALC-MCNC: 213 MG/DL (ref 0–99)
MCH RBC QN AUTO: 26.8 PG (ref 26.6–33)
MCHC RBC AUTO-ENTMCNC: 31.6 G/DL (ref 31.5–35.7)
MCV RBC AUTO: 85 FL (ref 79–97)
NT-PROBNP SERPL-MCNC: 254 PG/ML (ref 0–121)
PLATELET # BLD AUTO: 366 X10E3/UL (ref 150–450)
POTASSIUM SERPL-SCNC: 4.8 MMOL/L (ref 3.5–5.2)
PROT SERPL-MCNC: 7.4 G/DL (ref 6–8.5)
RBC # BLD AUTO: 6.13 X10E6/UL (ref 4.14–5.8)
SODIUM SERPL-SCNC: 133 MMOL/L (ref 134–144)
TRIGL SERPL-MCNC: 245 MG/DL (ref 0–149)
TSH SERPL DL<=0.005 MIU/L-ACNC: 1.53 UIU/ML (ref 0.45–4.5)
VLDLC SERPL CALC-MCNC: 49 MG/DL (ref 5–40)
WBC # BLD AUTO: 7.2 X10E3/UL (ref 3.4–10.8)

## 2022-09-14 RX ORDER — ROSUVASTATIN CALCIUM 20 MG/1
20 TABLET, COATED ORAL
Qty: 90 TABLET | Refills: 0 | Status: SHIPPED | OUTPATIENT
Start: 2022-09-14

## 2022-09-14 RX ORDER — INSULIN DEGLUDEC INJECTION 100 U/ML
35 INJECTION, SOLUTION SUBCUTANEOUS 2 TIMES DAILY
Qty: 23 ADJUSTABLE DOSE PRE-FILLED PEN SYRINGE | Refills: 1 | Status: SHIPPED | OUTPATIENT
Start: 2022-09-14 | End: 2022-09-14 | Stop reason: SDUPTHER

## 2022-09-14 RX ORDER — INSULIN DEGLUDEC INJECTION 100 U/ML
40 INJECTION, SOLUTION SUBCUTANEOUS 2 TIMES DAILY
Qty: 23 ADJUSTABLE DOSE PRE-FILLED PEN SYRINGE | Refills: 1 | Status: SHIPPED | OUTPATIENT
Start: 2022-09-14

## 2022-09-14 RX ORDER — PEN NEEDLE, DIABETIC 31 GX3/16"
NEEDLE, DISPOSABLE MISCELLANEOUS
Qty: 450 PEN NEEDLE | Refills: 1 | Status: SHIPPED | OUTPATIENT
Start: 2022-09-14

## 2022-09-14 RX ORDER — INSULIN ASPART 100 [IU]/ML
30 INJECTION, SOLUTION INTRAVENOUS; SUBCUTANEOUS
Qty: 30 ADJUSTABLE DOSE PRE-FILLED PEN SYRINGE | Refills: 1 | Status: SHIPPED | OUTPATIENT
Start: 2022-09-14

## 2022-09-14 NOTE — PROGRESS NOTES
There are many abnormalities on your blood work, this is not surprising given your conditions have been untreated for the past 9 months. Blood sugar is nearly 400. Sodium is a bit below normal. Kidney and liver function look okay for now. Your a1c is > 15.5, your diabetes is dangerously out of control. I am awaiting a response from Dr. Camelia Trejo for guidance on restarting your insulin. For now i'd like you to start tresiba at 35 units twice a day. High rbc and high hct on blood counts. Repeat this test in 3 months. LDL and total cholesterol are very uncontrolled. Rx for rosuvastatin 20 mg daily sent to your pharamcy, repeat fasting lipids in 6 weeks. Thyroid level is normal.  BNP is acceptable.

## 2022-10-04 ENCOUNTER — TELEPHONE (OUTPATIENT)
Dept: FAMILY MEDICINE CLINIC | Age: 47
End: 2022-10-04

## 2022-11-29 DIAGNOSIS — D35.2 PITUITARY MACROADENOMA (HCC): ICD-10-CM

## 2022-11-29 DIAGNOSIS — E29.1 HYPOGONADISM MALE: ICD-10-CM

## 2022-11-29 DIAGNOSIS — E22.1 HYPERPROLACTINEMIA (HCC): ICD-10-CM

## 2022-11-29 RX ORDER — CABERGOLINE 0.5 MG/1
TABLET ORAL
Qty: 8 TABLET | Refills: 2 | Status: SHIPPED | OUTPATIENT
Start: 2022-11-29

## 2023-01-03 ENCOUNTER — PATIENT MESSAGE (OUTPATIENT)
Dept: ENDOCRINOLOGY | Age: 48
End: 2023-01-03

## 2023-01-03 DIAGNOSIS — E11.65 TYPE 2 DIABETES MELLITUS WITH HYPERGLYCEMIA, WITH LONG-TERM CURRENT USE OF INSULIN (HCC): ICD-10-CM

## 2023-01-03 DIAGNOSIS — Z79.4 TYPE 2 DIABETES MELLITUS WITH HYPERGLYCEMIA, WITH LONG-TERM CURRENT USE OF INSULIN (HCC): ICD-10-CM

## 2023-01-03 RX ORDER — BLOOD-GLUCOSE TRANSMITTER
EACH MISCELLANEOUS
Qty: 1 EACH | Refills: 3 | Status: SHIPPED | OUTPATIENT
Start: 2023-01-03

## 2023-01-03 RX ORDER — BLOOD-GLUCOSE SENSOR
EACH MISCELLANEOUS
Qty: 3 EACH | Refills: 3 | Status: SHIPPED | OUTPATIENT
Start: 2023-01-03

## 2023-01-03 NOTE — TELEPHONE ENCOUNTER
From: Jose Nguyen  To: Gosia Ames MD  Sent: 1/3/2023 2:01 PM EST  Subject: Meter Prescription Needed    I need a prescription for a cgm.        Dexcom G6 Transmitter device       Dexcom G6 Sensor device     [facility]erred [facility]: [id number]/[address]    Thanks  [employer]

## 2023-01-03 NOTE — TELEPHONE ENCOUNTER
Patient Michael Delores, requested to have CGM supplies ordered.      Rx for diabetic supplies only sent to pharmacy

## 2023-01-11 DIAGNOSIS — E11.65 TYPE 2 DIABETES MELLITUS WITH HYPERGLYCEMIA, WITH LONG-TERM CURRENT USE OF INSULIN (HCC): ICD-10-CM

## 2023-01-11 DIAGNOSIS — Z79.4 TYPE 2 DIABETES MELLITUS WITH HYPERGLYCEMIA, WITH LONG-TERM CURRENT USE OF INSULIN (HCC): ICD-10-CM

## 2023-01-13 RX ORDER — INSULIN ASPART 100 [IU]/ML
INJECTION, SOLUTION INTRAVENOUS; SUBCUTANEOUS
Qty: 30 ML | Refills: 1 | Status: SHIPPED | OUTPATIENT
Start: 2023-01-13

## 2023-02-01 ENCOUNTER — OFFICE VISIT (OUTPATIENT)
Dept: ENDOCRINOLOGY | Age: 48
End: 2023-02-01
Payer: COMMERCIAL

## 2023-02-01 VITALS
BODY MASS INDEX: 46.65 KG/M2 | SYSTOLIC BLOOD PRESSURE: 147 MMHG | TEMPERATURE: 97.8 F | OXYGEN SATURATION: 96 % | DIASTOLIC BLOOD PRESSURE: 73 MMHG | HEART RATE: 76 BPM | RESPIRATION RATE: 18 BRPM | HEIGHT: 69 IN | WEIGHT: 315 LBS

## 2023-02-01 DIAGNOSIS — G47.33 OSA ON CPAP: ICD-10-CM

## 2023-02-01 DIAGNOSIS — E22.1 HYPERPROLACTINEMIA (HCC): ICD-10-CM

## 2023-02-01 DIAGNOSIS — Z99.89 OSA ON CPAP: ICD-10-CM

## 2023-02-01 DIAGNOSIS — D35.2 PITUITARY MACROADENOMA (HCC): ICD-10-CM

## 2023-02-01 DIAGNOSIS — E11.65 TYPE 2 DIABETES MELLITUS WITH HYPERGLYCEMIA, WITH LONG-TERM CURRENT USE OF INSULIN (HCC): ICD-10-CM

## 2023-02-01 DIAGNOSIS — E29.1 HYPOGONADISM MALE: ICD-10-CM

## 2023-02-01 DIAGNOSIS — Z79.4 TYPE 2 DIABETES MELLITUS WITH HYPERGLYCEMIA, WITH LONG-TERM CURRENT USE OF INSULIN (HCC): ICD-10-CM

## 2023-02-01 LAB
ALBUMIN/CREAT UR: 237 MG/G CREAT (ref 0–29)
BUN SERPL-MCNC: 18 MG/DL (ref 6–24)
BUN/CREAT SERPL: 20 (ref 9–20)
CALCIUM SERPL-MCNC: 10 MG/DL (ref 8.7–10.2)
CHLORIDE SERPL-SCNC: 98 MMOL/L (ref 96–106)
CO2 SERPL-SCNC: 25 MMOL/L (ref 20–29)
CREAT SERPL-MCNC: 0.91 MG/DL (ref 0.76–1.27)
CREAT UR-MCNC: 143.9 MG/DL
EGFRCR SERPLBLD CKD-EPI 2021: 104 ML/MIN/1.73
EST. AVERAGE GLUCOSE BLD GHB EST-MCNC: 269 MG/DL
GLUCOSE SERPL-MCNC: 137 MG/DL (ref 70–99)
HBA1C MFR BLD: 11 % (ref 4.8–5.6)
LDLC SERPL DIRECT ASSAY-MCNC: 95 MG/DL (ref 0–99)
MICROALBUMIN UR-MCNC: 340.5 UG/ML
POTASSIUM SERPL-SCNC: 4.5 MMOL/L (ref 3.5–5.2)
PROLACTIN SERPL-MCNC: 17.2 NG/ML (ref 4–15.2)
SODIUM SERPL-SCNC: 137 MMOL/L (ref 134–144)

## 2023-02-01 RX ORDER — TRAMADOL HYDROCHLORIDE 50 MG/1
50 TABLET ORAL
COMMUNITY

## 2023-02-01 RX ORDER — TESTOSTERONE CYPIONATE 200 MG/ML
INJECTION, SOLUTION INTRAMUSCULAR
Qty: 6 ML | Refills: 4 | Status: SHIPPED | OUTPATIENT
Start: 2023-02-01

## 2023-02-01 RX ORDER — MULTIVIT WITH MINERALS/HERBS
1 TABLET ORAL DAILY
COMMUNITY

## 2023-02-01 NOTE — LETTER
2/3/2023    Patient: Chantel Glez   YOB: 1975   Date of Visit: 2/1/2023     Jessica Horton NP  [address]  [address]  [address] [facility] [address]    Dear Jessica Horton NP,      Thank you for referring Mr. Bailey Castellon to [address] [address] for evaluation. My notes for this consultation are attached. If you have questions, please do not hesitate to call me. I look forward to following your patient along with you.       Sincerely,    Bree Dc MD

## 2023-02-01 NOTE — PROGRESS NOTES
[facility] DIABETES AND ENDOCRINOLOGY               MD Bonifacio Gamboa Alert  1975    Chief complaint: Diabetes mellitus, hypogonadism follow-up      History of Present Illness: Nadiya Marie is a 44 y.o. male here for follow-up    2/1/23  LV 3/2021  On Dexcom 33% in range, 38% high, 28% high, <1% low  Diabetic retinopathy  Has changed Novolog to 50 units in the AM and 25 units before lunch/dinner  Tresiba 75 units daily  Eating breakfast sandwiches  Drinking sodas, spreading the snacks throughout the day    Prior history  Type 2 diabetes mellitus: On MDI  He is on MDI, had nausea with Ozempic, he is titrating Ozempic slowly up  Taking medications consistently    Has DEXCOM-downloaded Dexcom clarity  No severe headache  Humalog U-200    TROY - on Bipap    Type 2 Diabetes was diagnosed 6yrs ago . 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 prolactin    MRI 2016 Sep        Wt Readings from Last 3 Encounters:   02/01/23 329 lb (149.2 kg)   09/09/22 312 lb 9.6 oz (141.8 kg)   11/02/21 350 lb (158.8 kg)       BP Readings from Last 3 Encounters:   02/01/23 (!) 147/73   09/09/22 (!) 165/93   11/02/21 (!) 170/92           Past Medical History:   Diagnosis Date    Anxiety     Arthritis     Cellulitis 12/2022    COVID-19 01/2022    Diabetic neuropathy (HCC)     DM (diabetes mellitus) ([address]     GERD (gastroesophageal reflux disease)     HTN (hypertension)     Hyperprolactinemia (HCC)     Hypogonadotropic hypogonadism (HCC)     Obesity, morbid (HCC)     TROY on CPAP     Pituitary macroadenoma ([address] 12/2014    Prolactinoma ([address]      Current Outpatient Medications   Medication Sig    traMADoL (ULTRAM) 50 mg tablet Take 50 mg by mouth every six (6) hours as needed for Pain. b complex vitamins tablet Take 1 Tablet by mouth daily.     NovoLOG FlexPen U-100 Insulin 100 unit/mL (3 mL) inpn INJECT 30 UNITS UNDER THE SKIN DAILY BEFORE BREAKFAST, LUNCH AND DINNER (Patient taking differently: 50 units at night 25 units before each meal)    Blood-Glucose Sensor (Dexcom G6 Sensor) ana laura Use as directed to check blood sugars. Replace every 10 days Dx code. E11.65    Blood-Glucose Transmitter (Dexcom G6 Transmitter) ana laura Use as directed dx code E11.65    cabergoline (DOSTINEX) 0.5 mg tablet TAKE 1/2 TABLET BY MOUTH TWICE WEEKLY    rosuvastatin (CRESTOR) 20 mg tablet Take 1 Tablet by mouth nightly. Insulin Needles, Disposable, (Ashlee Pen Needle) 32 gauge x 5/32\" ndle Use a new needle for each insulin dose. 5 doses daily. Dx: E11.65, Z79.4    insulin degludec Zondra Calkin FlexTouch U-100) 100 unit/mL (3 mL) inpn 40 Units by SubCUTAneous route two (2) times a day. (Patient taking differently: 75 Units by SubCUTAneous route daily.)    spironolactone (ALDACTONE) 25 mg tablet Take 1 Tablet by mouth daily. empagliflozin (Jardiance) 10 mg tablet Take 1 Tablet by mouth daily. DULoxetine (CYMBALTA) 60 mg capsule Take 1 Capsule by mouth daily. losartan-hydroCHLOROthiazide (HYZAAR) 100-25 mg per tablet Take 1 Tablet by mouth daily. Stop Prinzide    Dexcom G6 Transmitter ana laura Please dispense 10 day sample. Use as directed. Dexcom G6 Sensor ana laura Dispense 10 day sample. Use as directed. flash glucose sensor (FreeStyle Christiano 2 Sensor) kit Use to monitor blood sugar as directed. Change sensor every 14 days. Dx: E11.65, Z79.4, E11.319 (Patient not taking: Reported on 2/1/2023)    SUMAtriptan (IMITREX) 100 mg tablet Take by mouth at onset of migraine, no more than 1 dose in 24 hr period. (Patient not taking: No sig reported)    testosterone cypionate (DEPOTESTOTERONE CYPIONATE) 200 mg/mL injection INJECT 1.25ML INTO THE MUSCLE ONCE EVERY 2 WEEKS (Patient not taking: No sig reported)    bumetanide (BUMEX) 1 mg tablet  (Patient not taking: No sig reported)     No current facility-administered medications for this visit.      Allergies   Allergen Reactions Amoxicillin Hives    Erythromycin Other (comments)     Stomach cramps    Penicillins Hives    Sulfa (Sulfonamide Antibiotics) Hives         Review of Systems:  Per HPI      Physical Examination:   Blood pressure (!) 147/73, pulse 76, temperature 97.8 °F (36.6 °C), temperature source Temporal, resp. rate 18, height 5' 9\" (1.753 m), weight 329 lb (149.2 kg), SpO2 96 %. Estimated body mass index is 48.58 kg/m² as calculated from the following:    Height as of this encounter: 5' 9\" (1.753 m). Weight as of this encounter: 329 lb (149.2 kg).     General: pleasant, no distress, good eye contact  HEENT: no exophthalmos, no periorbital edema, EOMI  Neck: No visible thyromegaly  RS: Normal respiratory effort  Musculoskeletal: no tremors  Neurological: alert and oriented  Psychiatric: normal mood and affect  Skin: Normal color        Data Reviewed:       Lab Results   Component Value Date/Time    WBC 7.2 09/10/2022 10:01 AM    HGB 16.4 09/10/2022 10:01 AM    HCT 51.9 (H) 09/10/2022 10:01 AM    PLATELET 722 50/78/5314 10:01 AM    MCV 85 09/10/2022 10:01 AM     Lab Results   Component Value Date/Time    Hemoglobin A1c >15.5 (H) 09/10/2022 10:01 AM    Hemoglobin A1c 8.8 (H) 06/09/2021 12:00 AM    Hemoglobin A1c 9.6 (H) 10/26/2020 04:03 PM    Microalbumin/Creat ratio (mg/g creat) 25 02/25/2020 03:39 PM    Microalb/Creat ratio (ug/mg creat.) 776 (H) 09/09/2022 12:00 AM    Microalbumin,urine random 4.96 02/25/2020 03:39 PM    LDL,Direct 150 (H) 10/26/2020 04:03 PM    LDL, calculated 213 (H) 09/10/2022 10:01 AM    LDL, calculated 110 (H) 02/13/2018 02:00 PM    Creatinine 0.82 09/10/2022 10:01 AM      Lab Results   Component Value Date/Time    Cholesterol, total 305 (H) 09/10/2022 10:01 AM    HDL Cholesterol 43 09/10/2022 10:01 AM    LDL,Direct 150 (H) 10/26/2020 04:03 PM    LDL, calculated 213 (H) 09/10/2022 10:01 AM    LDL, calculated 110 (H) 02/13/2018 02:00 PM    Triglyceride 245 (H) 09/10/2022 10:01 AM     Lab Results Component Value Date/Time    Alk. phosphatase 77 09/10/2022 10:01 AM     Lab Results   Component Value Date/Time    GFR est AA >60 11/02/2021 06:40 AM    GFR est non-AA >60 11/02/2021 06:40 AM    Creatinine 0.82 09/10/2022 10:01 AM    BUN 12 09/10/2022 10:01 AM    Sodium 133 (L) 09/10/2022 10:01 AM    Potassium 4.8 09/10/2022 10:01 AM    Chloride 93 (L) 09/10/2022 10:01 AM    CO2 24 09/10/2022 10:01 AM      Lab Results   Component Value Date/Time    Prostate Specific Ag 0.6 07/23/2019 02:06 PM    Prostate Specific Ag 0.2 02/19/2018 03:02 PM    Prostate Specific Ag 0.9 11/18/2014 09:57 AM     Lab Results   Component Value Date/Time    TSH 1.530 09/10/2022 10:01 AM    T4, Free 1.38 01/02/2014 08:42 AM      Component      Latest Ref Rng 8/8/2013   TESTOSTERONE, TOTAL, LC/MS      348.0 - 1197.0 ng/dL 15.4 (L)   TESTOSTERONE, FREE      5.00 - 21.00 ng/dL 0.67 (L)   % Free testosterone      1.50 - 4.20 % 4.36 (H)   Luteinizing hormone      1.7 - 8.6 mIU/mL 0.5 (L)   FSH      1.5 - 12.4 mIU/mL 0.8 (L)   Prolactin      4.0 - 15.2 ng/mL 130.7 (H)      Lab Results   Component Value Date/Time    Testosterone 206 (L) 07/10/2014 03:42 PM     Lab Results   Component Value Date/Time    FSH 0.8 (L) 08/08/2013 11:35 AM    Luteinizing hormone 0.5 (L) 08/08/2013 11:35 AM    Prolactin 9.5 10/26/2020 04:03 PM           Lab Results   Component Value Date/Time    Hemoglobin A1c >15.5 (H) 09/10/2022 10:01 AM    Hemoglobin A1c 8.8 (H) 06/09/2021 12:00 AM    Hemoglobin A1c 9.6 (H) 10/26/2020 04:03 PM    Microalbumin/Creat ratio (mg/g creat) 25 02/25/2020 03:39 PM    Microalb/Creat ratio (ug/mg creat.) 776 (H) 09/09/2022 12:00 AM    Microalbumin,urine random 4.96 02/25/2020 03:39 PM    LDL,Direct 150 (H) 10/26/2020 04:03 PM    LDL, calculated 213 (H) 09/10/2022 10:01 AM    LDL, calculated 110 (H) 02/13/2018 02:00 PM    Creatinine 0.82 09/10/2022 10:01 AM      MRI 2014: normal      Assessment/Plan:     1.  Type 2 Diabetes Mellitus with retinopathy  Lab Results   Component Value Date/Time    Hemoglobin A1c >15.5 (H) 09/10/2022 10:01 AM    Hemoglobin A1c (POC) 11.9 03/16/2021 03:49 PM   high risk for hypoglycemia due to high dose of insulin   Poorly controlled diabetes mellitus, severe insulin resistance, has Dexcom  Metformin XR  Tresiba 100 units  Prandial insulin 40 - 50 units: Carb ratio 3    Continuous glucose monitor data downloaded for 2 weeks and reviewed with the patient  On Dexcom 33% in range, 38% high, 28% high, <1% low  Noted postprandial hyperglycemia  No severe hypoglycemia  Insulin adjusted   Ozempic   farxiga  Weight loss is the key   FLU annually ,Pneumovax ,aspirin daily,annual eye exam,microalbumin. Patient is checking the blood glucose 4 times a day. Patient is taking insulin 3 or more times per day  Patient is adjusting the insulin based on the blood glucose checks. Patient is at risk for hypoglycemia. I recommend continuous glucose monitor to help manage the diabetes better    High risk for complications, symptomatic hyperglycemia    2. Hyperlipidemia: Low-cholesterol diet  Statin    3. HTN :uncontrolled     4. TROY- on CPAP    5. Hypogonadotropic hypogonadism: secondary to obesity, narcotics, TROY  Testosterone did not improve much with normalization of prolactin  Cialis PRN       5. Macroadenoma 1.6 cm -prolactin secreting  MRI pituitary in August 2014 -normal MRI  MRI 2016: Small microadenoma  MRI 2019: No adenoma      6.   Diabetic retinopathy: Followed by ophthalmology      Spent > 40 minutes on the day of the visit reviewing chart, examining, ordering/reviewing labs, counseling, discussing therapeutics and documentation in the medical record  Cisco Sue MD      Patient verbalized understanding

## 2023-02-01 NOTE — PROGRESS NOTES
Angel Townsend is a 49 y.o. male here for No chief complaint on file. 1. Have you been to the ER or an urgent care clinic since your last visit?  - ER, [facility]    2. Have you been hospitalized since your last visit? - 12/23/2022 - 12/25/2022 Skin infection/ cellulitis [facility]    3. Have you seen or consulted any other health care providers outside of the [address] [place] [address] since your last visit?   Include any pap smears or colon screening.- Podiatrist, for open wound on foot Achilles,Ankle, and Foot Dr. London Armendariz,   [facility]

## 2023-02-01 NOTE — PATIENT INSTRUCTIONS
Tresiba 100 units once daily    Humalog or Novolog 40 - 50 units before breakfast and 30-40 units before lunch and dinner     Additional Humalog for high sugars     Blood sugar  Fast acting insulin          150-200  Extra 5 Units       201-250  Extra 10Units       251-300  Extra 15Units       301-350  Extra 20  Units        351-400  Extra 25 Units           Christin Motley is the [employer] and please contact him for any Dexcom supply questions. [phone]  [email or web address]      For clinical questions please contact Jose De Jesus Eagle     [phone]     [email or web address] [email or web address]

## 2023-02-02 ENCOUNTER — APPOINTMENT (OUTPATIENT)
Dept: GENERAL RADIOLOGY | Age: 48
End: 2023-02-02
Attending: EMERGENCY MEDICINE
Payer: COMMERCIAL

## 2023-02-02 ENCOUNTER — HOSPITAL ENCOUNTER (EMERGENCY)
Age: 48
Discharge: HOME | End: 2023-02-02
Attending: EMERGENCY MEDICINE
Payer: COMMERCIAL

## 2023-02-02 VITALS
WEIGHT: 315 LBS | HEIGHT: 69 IN | RESPIRATION RATE: 16 BRPM | BODY MASS INDEX: 46.65 KG/M2 | DIASTOLIC BLOOD PRESSURE: 85 MMHG | TEMPERATURE: 98 F | HEART RATE: 77 BPM | OXYGEN SATURATION: 97 % | SYSTOLIC BLOOD PRESSURE: 189 MMHG

## 2023-02-02 DIAGNOSIS — E78.2 MIXED HYPERLIPIDEMIA: ICD-10-CM

## 2023-02-02 DIAGNOSIS — T14.8XXA ABRASION: Primary | ICD-10-CM

## 2023-02-02 DIAGNOSIS — S60.032A CONTUSION OF LEFT MIDDLE FINGER WITHOUT DAMAGE TO NAIL, INITIAL ENCOUNTER: ICD-10-CM

## 2023-02-02 PROCEDURE — 74011000250 HC RX REV CODE- 250: Performed by: EMERGENCY MEDICINE

## 2023-02-02 PROCEDURE — 74011250636 HC RX REV CODE- 250/636: Performed by: EMERGENCY MEDICINE

## 2023-02-02 PROCEDURE — 90471 IMMUNIZATION ADMIN: CPT

## 2023-02-02 PROCEDURE — 73130 X-RAY EXAM OF HAND: CPT

## 2023-02-02 PROCEDURE — 90714 TD VACC NO PRESV 7 YRS+ IM: CPT | Performed by: EMERGENCY MEDICINE

## 2023-02-02 PROCEDURE — 99284 EMERGENCY DEPT VISIT MOD MDM: CPT

## 2023-02-02 RX ORDER — BACITRACIN ZINC 500 UNIT/G
1 OINTMENT IN PACKET (EA) TOPICAL ONCE
Status: COMPLETED | OUTPATIENT
Start: 2023-02-02 | End: 2023-02-02

## 2023-02-02 RX ADMIN — CLOSTRIDIUM TETANI TOXOID ANTIGEN (FORMALDEHYDE INACTIVATED) AND CORYNEBACTERIUM DIPHTHERIAE TOXOID ANTIGEN (FORMALDEHYDE INACTIVATED) 0.5 ML: 5; 2 INJECTION, SUSPENSION INTRAMUSCULAR at 19:44

## 2023-02-02 RX ADMIN — Medication 1 PACKET: at 19:42

## 2023-02-02 NOTE — Clinical Note
[address]  [address] [place] [id number]  [phone]    Work/School Note    Date: 2/2/2023    To Whom It May concern:    Meagan Daigle was seen and treated today in the emergency room by the following provider(s):  Attending Provider: Ana Trujillo MD.      Meagan Daigle is excused from work/school on 02/02/23 and 02/03/23. He is medically clear to return to work/school on 2/4/2023.        Sincerely,          Demetrius Solorio MD

## 2023-02-02 NOTE — ED TRIAGE NOTES
Shut left 3rd digit in car door yesterday. Cleaned and wrapped it last night, but when going to change dressing today noticed \"significant lacerations\" to both sides of finger. Also reported some darkening of the skin.

## 2023-02-03 ENCOUNTER — PATIENT MESSAGE (OUTPATIENT)
Dept: ENDOCRINOLOGY | Age: 48
End: 2023-02-03

## 2023-02-03 DIAGNOSIS — Z79.4 TYPE 2 DIABETES MELLITUS WITH HYPERGLYCEMIA, WITH LONG-TERM CURRENT USE OF INSULIN (HCC): ICD-10-CM

## 2023-02-03 DIAGNOSIS — E11.65 TYPE 2 DIABETES MELLITUS WITH HYPERGLYCEMIA, WITH LONG-TERM CURRENT USE OF INSULIN (HCC): ICD-10-CM

## 2023-02-03 RX ORDER — INSULIN DEGLUDEC INJECTION 200 U/ML
100 INJECTION, SOLUTION SUBCUTANEOUS DAILY
Qty: 54 ML | Refills: 3 | Status: SHIPPED | OUTPATIENT
Start: 2023-02-03

## 2023-02-03 RX ORDER — ROSUVASTATIN CALCIUM 20 MG/1
TABLET, COATED ORAL
Qty: 90 TABLET | Refills: 0 | Status: SHIPPED | OUTPATIENT
Start: 2023-02-03

## 2023-02-03 RX ORDER — INSULIN ASPART 100 [IU]/ML
INJECTION, SOLUTION INTRAVENOUS; SUBCUTANEOUS
Qty: 135 ML | Refills: 3 | Status: SHIPPED | OUTPATIENT
Start: 2023-02-03

## 2023-02-03 NOTE — ED PROVIDER NOTES
EMERGENCY DEPARTMENT HISTORY AND PHYSICAL EXAM      Date: 2/2/2023  Patient Name: Chantel Glez    History of Presenting Illness     Chief Complaint   Patient presents with    Finger Pain       History Provided By: Patient    HPI: Chantel Glez, 49 y.o. male   presents to the ED with cc of finger injury. Patient states that he sustained an injury to his left middle finger at about 11 PM yesterday when his finger was jammed in between car door. The pain in the middle finger is mild and constant with movement aggravating pain. No active bleeding. Patient provided wound cleansing at home. Patient is currently on clindamycin for a foot infection related to his diabetes. No fever chills. No other injury. Tetanus is not up-to-date. No OTC treatment. PCP: Delmis Jenkins NP    No current facility-administered medications on file prior to encounter. Current Outpatient Medications on File Prior to Encounter   Medication Sig Dispense Refill    traMADoL (ULTRAM) 50 mg tablet Take 50 mg by mouth every six (6) hours as needed for Pain. b complex vitamins tablet Take 1 Tablet by mouth daily. empagliflozin (Jardiance) 10 mg tablet Take 1 Tablet by mouth daily. 90 Tablet 3    testosterone cypionate (DEPOTESTOTERONE CYPIONATE) 200 mg/mL injection INJECT 1.25ML INTO THE MUSCLE ONCE EVERY 2 WEEKS 6 mL 4    NovoLOG FlexPen U-100 Insulin 100 unit/mL (3 mL) inpn INJECT 30 UNITS UNDER THE SKIN DAILY BEFORE BREAKFAST, LUNCH AND DINNER (Patient taking differently: 50 units at night 25 units before each meal) 30 mL 1    Blood-Glucose Sensor (Dexcom G6 Sensor) ana laura Use as directed to check blood sugars. Replace every 10 days Dx code. E11.65 3 Each 3    Blood-Glucose Transmitter (Dexcom G6 Transmitter) ana laura Use as directed dx code E11.65 1 Each 3    cabergoline (DOSTINEX) 0.5 mg tablet TAKE 1/2 TABLET BY MOUTH TWICE WEEKLY 8 Tablet 2    rosuvastatin (CRESTOR) 20 mg tablet Take 1 Tablet by mouth nightly.  [address] Tablet 0    Insulin Needles, Disposable, (Ashlee Pen Needle) 32 gauge x 5/32\" ndle Use a new needle for each insulin dose. 5 doses daily. Dx: E11.65, Z79.4 450 Pen Needle 1    spironolactone (ALDACTONE) 25 mg tablet Take 1 Tablet by mouth daily. 90 Tablet 0    DULoxetine (CYMBALTA) 60 mg capsule Take 1 Capsule by mouth daily. 90 Capsule 0    losartan-hydroCHLOROthiazide (HYZAAR) 100-25 mg per tablet Take 1 Tablet by mouth daily. Stop Prinzide 90 Tablet 0    Dexcom G6 Transmitter ana laura Please dispense 10 day sample. Use as directed. 1 Each 0    Dexcom G6 Sensor ana laura Dispense 10 day sample. Use as directed. 1 Each 0    flash glucose sensor (FreeStyle Christiano 2 Sensor) kit Use to monitor blood sugar as directed. Change sensor every 14 days. Dx: E11.65, Z79.4, E11.319 (Patient not taking: Reported on 2/1/2023) 6 Kit 1    SUMAtriptan (IMITREX) 100 mg tablet Take by mouth at onset of migraine, no more than 1 dose in 24 hr period.  (Patient not taking: No sig reported) 12 Tablet 2       Past History     Past Medical History:  Past Medical History:   Diagnosis Date    Anxiety     Arthritis     Cellulitis 12/2022    COVID-19 01/2022    Diabetic neuropathy (HCC)     DM (diabetes mellitus) ([address]     GERD (gastroesophageal reflux disease)     HTN (hypertension)     Hyperprolactinemia (HCC)     Hypogonadotropic hypogonadism (HCC)     Nicotine vapor product user     Non-nicotine vapor product user     Obesity, morbid ([address]     TROY on CPAP     Pituitary macroadenoma ([address] 12/2014    Prolactinoma ([address]        Past Surgical History:  Past Surgical History:   Procedure Laterality Date    HX COLONOSCOPY  7/14    HX WISDOM TEETH EXTRACTION         Family History:  Family History   Problem Relation Age of Onset    Hypertension Mother     Diabetes Father     Hypertension Brother     Diabetes Maternal Grandfather     Diabetes Paternal Grandfather        Social History:  Social History     Tobacco Use    Smoking status: Never    Smokeless tobacco: Never   Vaping Use    Vaping Use: Never used   Substance Use Topics    Alcohol use: Yes     Comment: social    Drug use: No       Allergies: Allergies   Allergen Reactions    Amoxicillin Hives    Erythromycin Other (comments)     Stomach cramps    Penicillins Hives    Sulfa (Sulfonamide Antibiotics) Hives         Review of Systems   Review of Systems   Constitutional:  Negative for chills and fever. Respiratory:  Negative for shortness of breath. Cardiovascular:  Negative for chest pain. Gastrointestinal:  Negative for abdominal pain. Neurological:  Negative for headaches. Physical Exam   Physical Exam  Vitals and nursing note reviewed. Constitutional:       General: He is not in acute distress. Appearance: Normal appearance. He is not ill-appearing, toxic-appearing or diaphoretic. HENT:      Head: Normocephalic and atraumatic. Eyes:      Conjunctiva/sclera: Conjunctivae normal.   Pulmonary:      Effort: Pulmonary effort is normal.   Musculoskeletal:      Cervical back: Neck supple. Comments: Left middle finger with a superficial abrasion on the dorsum of the hand that involved right entirety of the finger. On the palm side small area of hematoma along the distal with superficial skin avulsion. No signs of infection. No signs of tenosynovitis. No subungual hematoma. Patient is able to flex and extend the finger without difficulty. Skin:     General: Skin is warm and dry. Neurological:      General: No focal deficit present. Mental Status: He is alert. Psychiatric:         Behavior: Behavior normal.       Diagnostic Study Results     Labs -   No results found for this or any previous visit (from the past 12 hour(s)). Radiologic Studies -   XR HAND LT MIN 3 V   Final Result   No acute fracture.         CT Results  (Last 48 hours)      None          CXR Results  (Last 48 hours)      None              Medical Decision Making   I am the first provider for this patient. I reviewed the vital signs, available nursing notes, past medical history, past surgical history, family history and social history. Vital Signs-Reviewed the patient's vital signs. Patient Vitals for the past 12 hrs:   Temp Pulse Resp BP SpO2   02/02/23 1847 98 °F (36.7 °C) 77 16 (!) 189/85 97 %       Records Reviewed:     Provider Notes (Medical Decision Making):   Patient present with an isolated injury to left middle finger to sustained 11 PM yesterday in a car door. Clinically fracture is considered and x-rays obtained which was negative. No clinical signs of tenosynovitis. No clinical signs of cellulitis. Tetanus was given. Patient currently on clindamycin. No additional antibiotic is warranted at this time. I discussed in detail with the patient regarding the wound care especially in light of the patient's history of diabetes and current foot infection due to to his longstanding diabetes. Patient was discharged stable condition. ED Course:   Initial assessment performed. The patients presenting problems have been discussed, and they are in agreement with the care plan formulated and outlined with them. I have encouraged them to ask questions as they arise throughout their visit. PROCEDURES      Disposition: Condition stable   DC- Adult Discharges: All of the diagnostic tests were reviewed and questions answered. Diagnosis, care plan and treatment options were discussed. understand instructions and will follow up as directed. The patients results have been reviewed with them. They have been counseled regarding their diagnosis. The patient verbally convey understanding and agreement of the signs, symptoms, diagnosis, treatment and prognosis and additionally agrees to follow up as recommended. They also agree with the care-plan and convey that all of their questions have been answered.   I have also put together some discharge instructions for them that include: 1) educational information regarding their diagnosis, 2) how to care for their diagnosis at home, as well a 3) list of reasons why they would want to return to the ED prior to their follow-up appointment, should their condition change. PLAN:  1. Current Discharge Medication List        2. Follow-up Information       Follow up With Specialties Details Why Contact Info    Follow up with your primary care physician  Schedule an appointment as soon as possible for a visit in 3 days As needed           Return to ED if worse     Diagnosis     Clinical Impression:   1. Abrasion    2. Contusion of left middle finger without damage to nail, initial encounter        Please note that this dictation was completed with [employer], the computer voice recognition software. Quite often unanticipated grammatical, syntax, homophones, and other interpretive errors are inadvertently transcribed by the computer software. Please disregard these errors. Please excuse any errors that have escaped final proofreading. Thank you.

## 2023-02-06 DIAGNOSIS — Z79.4 TYPE 2 DIABETES MELLITUS WITH HYPERGLYCEMIA, WITH LONG-TERM CURRENT USE OF INSULIN (HCC): ICD-10-CM

## 2023-02-06 DIAGNOSIS — F41.1 GAD (GENERALIZED ANXIETY DISORDER): ICD-10-CM

## 2023-02-06 DIAGNOSIS — E11.65 TYPE 2 DIABETES MELLITUS WITH HYPERGLYCEMIA, WITH LONG-TERM CURRENT USE OF INSULIN (HCC): ICD-10-CM

## 2023-02-06 DIAGNOSIS — F33.1 MODERATE EPISODE OF RECURRENT MAJOR DEPRESSIVE DISORDER (HCC): ICD-10-CM

## 2023-02-06 DIAGNOSIS — I10 ESSENTIAL HYPERTENSION: ICD-10-CM

## 2023-02-06 RX ORDER — LOSARTAN POTASSIUM AND HYDROCHLOROTHIAZIDE 25; 100 MG/1; MG/1
1 TABLET ORAL DAILY
Qty: 90 TABLET | Refills: 0 | Status: SHIPPED | OUTPATIENT
Start: 2023-02-06

## 2023-02-06 RX ORDER — SPIRONOLACTONE 25 MG/1
25 TABLET ORAL DAILY
Qty: 90 TABLET | Refills: 0 | Status: SHIPPED | OUTPATIENT
Start: 2023-02-06

## 2023-02-06 RX ORDER — DULOXETIN HYDROCHLORIDE 60 MG/1
60 CAPSULE, DELAYED RELEASE ORAL DAILY
Qty: 90 CAPSULE | Refills: 0 | Status: SHIPPED | OUTPATIENT
Start: 2023-02-06

## 2023-02-06 NOTE — TELEPHONE ENCOUNTER
Patient needs in office appt to follow up on uncontrolled blood pressure, above goal here and at his recent appt with endocrine.  Please call to schedule

## 2023-02-06 NOTE — LETTER
2/6/2023 4:52 PM    Mr. Brittany Turner  [address] [facility] [id number]      Dear Mr. Oconnorna Jared:    Calli Rebolledo approved your requested medication. However, you are due for in office appointment for your uncontrolled blood pressure. Please call our office at [phone] and schedule a follow up appointment for your continued care.         Sincerely,      Tracie Mack NP

## 2023-02-08 RX ORDER — INSULIN ASPART 100 [IU]/ML
INJECTION, SOLUTION INTRAVENOUS; SUBCUTANEOUS
Qty: 135 ML | Refills: 3 | Status: SHIPPED | OUTPATIENT
Start: 2023-02-08 | End: 2023-02-09 | Stop reason: ALTCHOICE

## 2023-02-09 DIAGNOSIS — Z79.4 TYPE 2 DIABETES MELLITUS WITH HYPERGLYCEMIA, WITH LONG-TERM CURRENT USE OF INSULIN (HCC): Primary | ICD-10-CM

## 2023-02-09 DIAGNOSIS — E11.65 TYPE 2 DIABETES MELLITUS WITH HYPERGLYCEMIA, WITH LONG-TERM CURRENT USE OF INSULIN (HCC): Primary | ICD-10-CM

## 2023-02-09 RX ORDER — INSULIN LISPRO 100 [IU]/ML
INJECTION, SOLUTION INTRAVENOUS; SUBCUTANEOUS
Qty: 135 ML | Refills: 3 | Status: SHIPPED | OUTPATIENT
Start: 2023-02-09

## 2023-02-21 ENCOUNTER — TELEPHONE (OUTPATIENT)
Dept: ENDOCRINOLOGY | Age: 48
End: 2023-02-21

## 2023-02-21 DIAGNOSIS — E11.65 TYPE 2 DIABETES MELLITUS WITH HYPERGLYCEMIA, WITH LONG-TERM CURRENT USE OF INSULIN (HCC): Primary | ICD-10-CM

## 2023-02-21 DIAGNOSIS — Z79.4 TYPE 2 DIABETES MELLITUS WITH HYPERGLYCEMIA, WITH LONG-TERM CURRENT USE OF INSULIN (HCC): Primary | ICD-10-CM

## 2023-02-21 NOTE — TELEPHONE ENCOUNTER
Ronel, Nurse mgr with [facility] left VM asking for a PA for Dexcom to be sent in for pt as he has been paying out of pocket. Can call [phone] to expedite. She also wants Ozempic to be sent in for pt.  She stated Ozempic is better than Trulicity pertaining to diabetic retinopathy

## 2023-03-06 ENCOUNTER — VIRTUAL VISIT (OUTPATIENT)
Dept: FAMILY MEDICINE CLINIC | Age: 48
End: 2023-03-06
Payer: COMMERCIAL

## 2023-03-06 ENCOUNTER — TELEPHONE (OUTPATIENT)
Dept: FAMILY MEDICINE CLINIC | Age: 48
End: 2023-03-06

## 2023-03-06 ENCOUNTER — PATIENT MESSAGE (OUTPATIENT)
Dept: ENDOCRINOLOGY | Age: 48
End: 2023-03-06

## 2023-03-06 DIAGNOSIS — Z79.4 TYPE 2 DIABETES MELLITUS WITH HYPERGLYCEMIA, WITH LONG-TERM CURRENT USE OF INSULIN (HCC): ICD-10-CM

## 2023-03-06 DIAGNOSIS — E78.2 MIXED HYPERLIPIDEMIA: ICD-10-CM

## 2023-03-06 DIAGNOSIS — E11.65 TYPE 2 DIABETES MELLITUS WITH HYPERGLYCEMIA, WITH LONG-TERM CURRENT USE OF INSULIN (HCC): ICD-10-CM

## 2023-03-06 DIAGNOSIS — E11.319 DIABETIC RETINOPATHY OF BOTH EYES ASSOCIATED WITH TYPE 2 DIABETES MELLITUS, MACULAR EDEMA PRESENCE UNSPECIFIED, UNSPECIFIED RETINOPATHY SEVERITY (HCC): ICD-10-CM

## 2023-03-06 DIAGNOSIS — I10 PRIMARY HYPERTENSION: Primary | ICD-10-CM

## 2023-03-06 PROCEDURE — 3046F HEMOGLOBIN A1C LEVEL >9.0%: CPT | Performed by: NURSE PRACTITIONER

## 2023-03-06 PROCEDURE — 99214 OFFICE O/P EST MOD 30 MIN: CPT | Performed by: NURSE PRACTITIONER

## 2023-03-06 RX ORDER — SPIRONOLACTONE 50 MG/1
50 TABLET, FILM COATED ORAL DAILY
Qty: 90 TABLET | Refills: 0 | Status: SHIPPED | OUTPATIENT
Start: 2023-03-06

## 2023-03-06 RX ORDER — BLOOD-GLUCOSE SENSOR
EACH MISCELLANEOUS
Qty: 3 EACH | Refills: 11 | Status: SHIPPED | OUTPATIENT
Start: 2023-03-06

## 2023-03-06 RX ORDER — BLOOD-GLUCOSE TRANSMITTER
EACH MISCELLANEOUS
Qty: 1 EACH | Refills: 3 | Status: SHIPPED | OUTPATIENT
Start: 2023-03-06

## 2023-03-06 RX ORDER — SEMAGLUTIDE 1.34 MG/ML
0.25 INJECTION, SOLUTION SUBCUTANEOUS
Qty: 3 ML | Refills: 5 | Status: SHIPPED | OUTPATIENT
Start: 2023-03-06

## 2023-03-06 NOTE — TELEPHONE ENCOUNTER
Spoke with leann at [facility] re patient awaiting prior auth on Dexcom and ozempic. She states she will follow up with insurance and patient on this.

## 2023-03-06 NOTE — PROGRESS NOTES
Vanesa Hager is a 49 y.o. male , id x 2(name and ). Reviewed questionnaires, and  medications. Chief Complaint   Patient presents with    Hypertension     Pt states checks BP at home, monthly average has been 141/83 (electronic cuff). Today's BP at 8:00 am was 155/90 (electronic). Today's BP at 10:00 am was 160/80 (manual at a medical office). 1. Have you been to the ER, urgent care clinic since your last visit? Hospitalized since your last visit? Yes [facility]    2. Have you seen or consulted any other health care providers outside of the [address] [place] [address] since your last visit? Include any pap smears or colon screening.  Yes Podiatrist, [facility]

## 2023-03-06 NOTE — PROGRESS NOTES
Stephenie Cooney is a 49 y.o. male who was seen by synchronous (real-time) audio-video technology on 3/6/2023 for Hypertension (Pt states checks BP at home, monthly average has been 141/83 (electronic cuff). /Today's BP at 8:00 am was 155/90 (electronic). /Today's BP at 10:00 am was 160/80 (manual at a medical office). ) and Medication Evaluation (Dexcom- needs prior authorization. )        Assessment & Plan:   Diagnoses and all orders for this visit:    1. Primary hypertension  Above goal on home monitoring and at another medical appt this morning  Increase spironolactone to 50 mg daily, repeat BMP in 10 days  Continue losartan-hctz at current dose  Low sodium diet recommended   Weight loss recommended   -     METABOLIC PANEL, COMPREHENSIVE; Future    2. Type 2 diabetes mellitus with hyperglycemia, with long-term current use of insulin (HCC)  3. Diabetic retinopathy of both eyes associated with type 2 diabetes mellitus, macular edema presence unspecified, unspecified retinopathy severity ([address]  A1c well above goal as last check at the end of January  He is awaiting prior auth for ozempic from endocrine, will contact [facility] to follow up on this for him  Encouraged improved compliance with diet and exercise recommended   Encouraged weight loss     4. Mixed hyperlipidemia  Last LDL above goal  Continue rosuvastatin 20 mg daily  Repeat fasting lipids at follow up  Heart healthy diet recommended     Follow-up and Dispositions    Return in about 4 weeks (around 4/3/2023) for blood pressure. I have discussed the diagnosis with the patient and the intended plan as seen in the above orders, and questions were answered concerning future plans. Patient conveyed understanding of the plan at the time of the visit. Subjective:     HPI:    Presents for follow-up of hypertension, diabetes, hyperlipidemia. Patient reports recent blood pressures above goal range at home and in other medical appointments.   Pressure today was 155/90 at home and 160/80 at another medical appointment. He notes monthly average blood pressure from his records at home has been 141/83. Denies headache, blurred vision, chest pain, dyspnea on exertion, or increased lower extremity edema. Reports good compliance with losartan-hydrochlorothiazide and spironolactone, tolerating well without apparent side effects. Cardiovascular risk analysis - LDL goal is under 70  diabetic  hypertension  hyperlipidemia. Compliance with treatment thus far has been good. Diet and Lifestyle: not attempting to follow a low fat, low cholesterol diet, not attempting to follow a low sodium diet, does not rigorously follow a diabetic diet, sedentary, nonsmoker    Cardiovascular ROS: taking medications as instructed, no medication side effects noted, no TIA's, no chest pain on exertion, no dyspnea on exertion, no orthostatic dizziness or lightheadedness, no orthopnea or paroxysmal nocturnal dyspnea, no palpitations, no intermittent claudication symptoms. Patient requesting prior Auth on Dexcom sensors and transmitter's, states his insurance is advised they will approve once prior Auth has been submitted. He has requested from his endocrinologist office but unfortunately has not received word yet that its been processed. A1c was well above goal at check prior to his endocrinology appointment in early February. He notes after discussion they decided to restart Ozempic, he has been off of this for over a year. He is awaiting prescription and prior Auth for this medication as well. Prior to Admission medications    Medication Sig Start Date End Date Taking? Authorizing Provider   HumaLOG KwikPen Insulin 100 unit/mL kwikpen Inject 40-50 units before breakfast, 30-40 units before lunch and dinner with SSI. Max units daily: 150. Stop Novolog 2/9/23  Yes Anitra Villalta MD   spironolactone (ALDACTONE) 25 mg tablet Take 1 Tablet by mouth daily.  2/6/23 Yes Sherman Maloney NP   DULoxetine (CYMBALTA) 60 mg capsule Take 1 Capsule by mouth daily. 2/6/23  Yes Sherman Maloney NP   losartan-hydroCHLOROthiazide (HYZAAR) 100-25 mg per tablet Take 1 Tablet by mouth daily. Stop Prinzide 2/6/23  Yes Earnest Reyes NP   Roland Dawkinss FlexTouch U-200 200 unit/mL (3 mL) inpn pen 100 Units by SubCUTAneous route daily. 2/3/23  Yes Ava Matthews MD   rosuvastatin (CRESTOR) 20 mg tablet TAKE ONE TABLET BY MOUTH EVERY EVENING 2/3/23  Yes Sherman Maloney NP   traMADoL (ULTRAM) 50 mg tablet Take 50 mg by mouth every six (6) hours as needed for Pain. Yes Provider, Historical   b complex vitamins tablet Take 1 Tablet by mouth daily. Yes Provider, Historical   empagliflozin (Jardiance) 10 mg tablet Take 1 Tablet by mouth daily. 2/1/23  Yes Ava Matthews MD   testosterone cypionate (DEPOTESTOTERONE CYPIONATE) 200 mg/mL injection INJECT 1.25ML INTO THE MUSCLE ONCE EVERY 2 WEEKS 2/1/23  Yes Ava Matthews MD   cabergoline (DOSTINEX) 0.5 mg tablet TAKE 1/2 TABLET BY MOUTH TWICE WEEKLY 11/29/22  Yes Ermias Page MD   Blood-Glucose Sensor (Dexcom G6 Sensor) ana laura Use as directed to check blood sugars. Replace every 10 days Dx code. E11.65 1/3/23   Ava Matthews MD   Blood-Glucose Transmitter (Dexcom G6 Transmitter) ana laura Use as directed dx code E11.65 1/3/23   Ava Matthews MD   Insulin Needles, Disposable, (Ashlee Pen Needle) 32 gauge x 5/32\" ndle Use a new needle for each insulin dose. 5 doses daily. Dx: E11.65, Z79.4 9/14/22   Earnest Reyes NP   Dexcom G6 Transmitter ana laura Please dispense 10 day sample. Use as directed. 9/9/22   Vera Maloney NP   Dexcom G6 Sensor ana laura Dispense 10 day sample. Use as directed. 9/9/22   Earnest Reyes NP   flash glucose sensor (FreeStyle Christiano 2 Sensor) kit Use to monitor blood sugar as directed. Change sensor every 14 days.  Dx: E11.65, Z79.4, E11.319  Patient not taking: Reported on 2/1/2023 9/9/22 Julieta Mayer NP   SUMAtriptan (IMITREX) 100 mg tablet Take by mouth at onset of migraine, no more than 1 dose in 24 hr period.   Patient not taking: No sig reported 11/3/21   Julieta Mayer NP     Patient Active Problem List   Diagnosis Code    Type 2 diabetes mellitus with retinopathy, without long-term current use of insulin ([facility] E11.319    HTN (hypertension) I10    TROY on CPAP G47.33, Z99.89    Hypogonadism male E29.1    Hyperprolactinemia (HCC) E22.1    Hyperlipidemia E78.5    Pituitary macroadenoma (HCC) D35.2    Obesity, morbid (HCC) E66.01    Moderate episode of recurrent major depressive disorder ([facility] F33.1    SHIMON (generalized anxiety disorder) F41.1    Type 2 diabetes mellitus with hyperglycemia, with long-term current use of insulin (HCC) E11.65, Z79.4    Chronic diastolic congestive heart failure (HCC) I50.32     Allergies   Allergen Reactions    Amoxicillin Hives    Erythromycin Other (comments)     Stomach cramps    Penicillins Hives    Sulfa (Sulfonamide Antibiotics) Hives     Past Medical History:   Diagnosis Date    Anxiety     Arthritis     Cellulitis 12/2022    COVID-19 01/2022    Diabetic neuropathy (HCC)     DM (diabetes mellitus) ([address]     GERD (gastroesophageal reflux disease)     HTN (hypertension)     Hyperprolactinemia (HCC)     Hypogonadotropic hypogonadism (HCC)     Nicotine vapor product user     Non-nicotine vapor product user     Obesity, morbid ([address]     TROY on CPAP     Pituitary macroadenoma ([address] 12/2014    Prolactinoma ([address]      Past Surgical History:   Procedure Laterality Date    HX COLONOSCOPY  7/14    HX WISDOM TEETH EXTRACTION       Family History   Problem Relation Age of Onset    Hypertension Mother     Diabetes Father     Hypertension Brother     Diabetes Maternal Grandfather     Diabetes Paternal Grandfather      Social History     Tobacco Use    Smoking status: Never    Smokeless tobacco: Never   Substance Use Topics    Alcohol use: Yes     Comment: social Lab Results   Component Value Date/Time    Hemoglobin A1c 11.0 (H) 01/31/2023 08:15 AM    Hemoglobin A1c (POC) 11.9 03/16/2021 03:49 PM     Lab Results   Component Value Date/Time    Cholesterol, total 305 (H) 09/10/2022 10:01 AM    HDL Cholesterol 43 09/10/2022 10:01 AM    LDL,Direct 95 01/31/2023 08:15 AM    LDL, calculated 213 (H) 09/10/2022 10:01 AM    LDL, calculated 110 (H) 02/13/2018 02:00 PM    VLDL, calculated 49 (H) 09/10/2022 10:01 AM    VLDL, calculated 30 02/13/2018 02:00 PM    Triglyceride 245 (H) 09/10/2022 10:01 AM         Review of Systems   Constitutional: Negative. HENT: Negative. Eyes: Negative. Respiratory: Negative. Cardiovascular: Negative. Gastrointestinal: Negative. Genitourinary: Negative. Musculoskeletal: Negative. Skin: Negative. Neurological: Negative. Endo/Heme/Allergies: Negative. Psychiatric/Behavioral: Negative. Objective:   No flowsheet data found. General: alert, cooperative, no distress   Mental  status: normal mood, behavior, speech, dress, motor activity, and thought processes, able to follow commands   HENT: NCAT   Neck: no visualized mass   Resp: no respiratory distress   Neuro: no gross deficits   Skin: no discoloration or lesions of concern on visible areas   Psychiatric: normal affect, consistent with stated mood, no evidence of hallucinations     Additional exam findings:   none    We discussed the expected course, resolution and complications of the diagnosis(es) in detail. Medication risks, benefits, costs, interactions, and alternatives were discussed as indicated. I advised him to contact the office if his condition worsens, changes or fails to improve as anticipated. He expressed understanding with the diagnosis(es) and plan. Casandrasusana Márquez, was evaluated through a synchronous (real-time) audio-video encounter.  The patient (or guardian if applicable) is aware that this is a billable service, which includes applicable co-pays. This Virtual Visit was conducted with patient's (and/or legal guardian's) consent. The visit was conducted pursuant to the emergency declaration under the [address] [place] [address] and the [email or web address] and [employer] General Act. Patient identification was verified, and a caregiver was present when appropriate.   The patient was located at: Home: [place] [phone]  The provider was located at: Home: [address] [place] [address]  3/6/2023

## 2023-03-07 DIAGNOSIS — E29.1 HYPOGONADISM MALE: ICD-10-CM

## 2023-03-07 DIAGNOSIS — E11.65 TYPE 2 DIABETES MELLITUS WITH HYPERGLYCEMIA, WITH LONG-TERM CURRENT USE OF INSULIN (HCC): ICD-10-CM

## 2023-03-07 DIAGNOSIS — D35.2 PITUITARY MACROADENOMA (HCC): ICD-10-CM

## 2023-03-07 DIAGNOSIS — Z79.4 TYPE 2 DIABETES MELLITUS WITH HYPERGLYCEMIA, WITH LONG-TERM CURRENT USE OF INSULIN (HCC): ICD-10-CM

## 2023-03-07 DIAGNOSIS — E22.1 HYPERPROLACTINEMIA (HCC): ICD-10-CM

## 2023-03-07 RX ORDER — BLOOD-GLUCOSE SENSOR
EACH MISCELLANEOUS
Qty: 3 EACH | Refills: 11 | Status: SHIPPED | OUTPATIENT
Start: 2023-03-07 | End: 2023-03-07 | Stop reason: SDUPTHER

## 2023-03-08 RX ORDER — CABERGOLINE 0.5 MG/1
TABLET ORAL
Qty: 8 TABLET | Refills: 3 | Status: SHIPPED | OUTPATIENT
Start: 2023-03-08

## 2023-03-08 RX ORDER — BLOOD-GLUCOSE SENSOR
EACH MISCELLANEOUS
Qty: 3 EACH | Refills: 11 | Status: SHIPPED | OUTPATIENT
Start: 2023-03-08

## 2023-05-20 RX ORDER — LOSARTAN POTASSIUM AND HYDROCHLOROTHIAZIDE 25; 100 MG/1; MG/1
1 TABLET ORAL DAILY
COMMUNITY
Start: 2023-02-06

## 2023-05-20 RX ORDER — ROSUVASTATIN CALCIUM 20 MG/1
1 TABLET, COATED ORAL NIGHTLY
COMMUNITY
Start: 2023-02-03

## 2023-05-20 RX ORDER — DULOXETIN HYDROCHLORIDE 60 MG/1
60 CAPSULE, DELAYED RELEASE ORAL DAILY
COMMUNITY
Start: 2023-02-06

## 2023-05-20 RX ORDER — TESTOSTERONE CYPIONATE 200 MG/ML
INJECTION, SOLUTION INTRAMUSCULAR
COMMUNITY
Start: 2023-02-01

## 2023-05-20 RX ORDER — SEMAGLUTIDE 1.34 MG/ML
0.25 INJECTION, SOLUTION SUBCUTANEOUS
COMMUNITY
Start: 2023-03-06

## 2023-05-20 RX ORDER — SUMATRIPTAN 100 MG/1
TABLET, FILM COATED ORAL
COMMUNITY
Start: 2021-11-03

## 2023-05-20 RX ORDER — INSULIN LISPRO 100 [IU]/ML
INJECTION, SOLUTION INTRAVENOUS; SUBCUTANEOUS
COMMUNITY
Start: 2023-02-09

## 2023-05-20 RX ORDER — INSULIN DEGLUDEC 200 U/ML
100 INJECTION, SOLUTION SUBCUTANEOUS DAILY
COMMUNITY
Start: 2023-02-03

## 2023-05-20 RX ORDER — SPIRONOLACTONE 50 MG/1
50 TABLET, FILM COATED ORAL DAILY
COMMUNITY
Start: 2023-03-06

## 2023-05-20 RX ORDER — CABERGOLINE 0.5 MG/1
TABLET ORAL
COMMUNITY
Start: 2023-03-08

## 2023-05-20 RX ORDER — TRAMADOL HYDROCHLORIDE 50 MG/1
50 TABLET ORAL EVERY 6 HOURS PRN
COMMUNITY

## 2023-10-11 ENCOUNTER — HOSPITAL ENCOUNTER (EMERGENCY)
Facility: HOSPITAL | Age: 48
Discharge: HOME | End: 2023-10-11
Payer: COMMERCIAL

## 2023-10-11 VITALS
TEMPERATURE: 98 F | RESPIRATION RATE: 18 BRPM | HEIGHT: 69 IN | SYSTOLIC BLOOD PRESSURE: 179 MMHG | HEART RATE: 78 BPM | BODY MASS INDEX: 46.65 KG/M2 | DIASTOLIC BLOOD PRESSURE: 78 MMHG | WEIGHT: 315 LBS | OXYGEN SATURATION: 96 %

## 2023-10-11 DIAGNOSIS — R19.7 DIARRHEA, UNSPECIFIED TYPE: Primary | ICD-10-CM

## 2023-10-11 LAB
ALBUMIN SERPL-MCNC: 3.7 G/DL (ref 3.5–5)
ALBUMIN/GLOB SERPL: 0.8 (ref 1.1–2.2)
ALP SERPL-CCNC: 78 U/L (ref 45–117)
ALT SERPL-CCNC: 45 U/L (ref 12–78)
ANION GAP BLD CALC-SCNC: 10
ANION GAP SERPL CALC-SCNC: 8 MMOL/L (ref 5–15)
ANION GAP SERPL CALC-SCNC: 9 MMOL/L (ref 5–15)
AST SERPL W P-5'-P-CCNC: ABNORMAL U/L (ref 15–37)
BASOPHILS # BLD: 0.1 K/UL (ref 0–0.1)
BASOPHILS NFR BLD: 1 % (ref 0–1)
BILIRUB SERPL-MCNC: 0.4 MG/DL (ref 0.2–1)
BUN SERPL-MCNC: 13 MG/DL (ref 6–20)
BUN SERPL-MCNC: 15 MG/DL (ref 6–20)
BUN/CREAT SERPL: 14 (ref 12–20)
BUN/CREAT SERPL: 14 (ref 12–20)
CA-I BLD-MCNC: 1.11 MMOL/L (ref 1.12–1.32)
CA-I BLD-MCNC: 10.2 MG/DL (ref 8.5–10.1)
CA-I BLD-MCNC: 9.4 MG/DL (ref 8.5–10.1)
CHLORIDE BLD-SCNC: 101 MMOL/L (ref 98–107)
CHLORIDE SERPL-SCNC: 102 MMOL/L (ref 97–108)
CHLORIDE SERPL-SCNC: 99 MMOL/L (ref 97–108)
CO2 BLD-SCNC: 26 MMOL/L
CO2 SERPL-SCNC: 22 MMOL/L (ref 21–32)
CO2 SERPL-SCNC: 26 MMOL/L (ref 21–32)
CREAT SERPL-MCNC: 0.96 MG/DL (ref 0.7–1.3)
CREAT SERPL-MCNC: 1.05 MG/DL (ref 0.7–1.3)
CREAT UR-MCNC: 0.89 MG/DL (ref 0.6–1.3)
DIFFERENTIAL METHOD BLD: ABNORMAL
EOSINOPHIL # BLD: 0.3 K/UL (ref 0–0.4)
EOSINOPHIL NFR BLD: 3 % (ref 0–7)
ERYTHROCYTE [DISTWIDTH] IN BLOOD BY AUTOMATED COUNT: 14.3 % (ref 11.5–14.5)
GLOBULIN SER CALC-MCNC: 4.9 G/DL (ref 2–4)
GLUCOSE BLD STRIP.AUTO-MCNC: 141 MG/DL (ref 65–100)
GLUCOSE SERPL-MCNC: 134 MG/DL (ref 65–100)
GLUCOSE SERPL-MCNC: 84 MG/DL (ref 65–100)
HCT VFR BLD AUTO: 45.4 % (ref 36.6–50.3)
HGB BLD-MCNC: 14.9 G/DL (ref 12.1–17)
IMM GRANULOCYTES # BLD AUTO: 0.1 K/UL (ref 0–0.04)
IMM GRANULOCYTES NFR BLD AUTO: 1 % (ref 0–0.5)
LYMPHOCYTES # BLD: 3.7 K/UL (ref 0.8–3.5)
LYMPHOCYTES NFR BLD: 34 % (ref 12–49)
MCH RBC QN AUTO: 26.9 PG (ref 26–34)
MCHC RBC AUTO-ENTMCNC: 32.8 G/DL (ref 30–36.5)
MCV RBC AUTO: 81.9 FL (ref 80–99)
MONOCYTES # BLD: 0.9 K/UL (ref 0–1)
MONOCYTES NFR BLD: 8 % (ref 5–13)
NEUTS SEG # BLD: 5.9 K/UL (ref 1.8–8)
NEUTS SEG NFR BLD: 53 % (ref 32–75)
NRBC # BLD: 0 K/UL (ref 0–0.01)
NRBC BLD-RTO: 0 PER 100 WBC
PLATELET # BLD AUTO: 396 K/UL (ref 150–400)
PMV BLD AUTO: 9.2 FL (ref 8.9–12.9)
POTASSIUM BLD-SCNC: 4.3 MMOL/L (ref 3.5–5.5)
POTASSIUM SERPL-SCNC: ABNORMAL MMOL/L (ref 3.5–5.1)
POTASSIUM SERPL-SCNC: ABNORMAL MMOL/L (ref 3.5–5.1)
PROT SERPL-MCNC: 8.6 G/DL (ref 6.4–8.2)
RBC # BLD AUTO: 5.54 M/UL (ref 4.1–5.7)
SODIUM BLD-SCNC: 136 MMOL/L (ref 136–145)
SODIUM SERPL-SCNC: 132 MMOL/L (ref 136–145)
SODIUM SERPL-SCNC: 134 MMOL/L (ref 136–145)
WBC # BLD AUTO: 10.9 K/UL (ref 4.1–11.1)

## 2023-10-11 PROCEDURE — 80053 COMPREHEN METABOLIC PANEL: CPT

## 2023-10-11 PROCEDURE — 36415 COLL VENOUS BLD VENIPUNCTURE: CPT

## 2023-10-11 PROCEDURE — 80048 BASIC METABOLIC PNL TOTAL CA: CPT

## 2023-10-11 PROCEDURE — 99284 EMERGENCY DEPT VISIT MOD MDM: CPT

## 2023-10-11 PROCEDURE — 2580000003 HC RX 258

## 2023-10-11 PROCEDURE — 85025 COMPLETE CBC W/AUTO DIFF WBC: CPT

## 2023-10-11 PROCEDURE — 80047 BASIC METABLC PNL IONIZED CA: CPT

## 2023-10-11 RX ORDER — 0.9 % SODIUM CHLORIDE 0.9 %
1000 INTRAVENOUS SOLUTION INTRAVENOUS ONCE
Status: COMPLETED | OUTPATIENT
Start: 2023-10-11 | End: 2023-10-11

## 2023-10-11 RX ORDER — LOPERAMIDE HYDROCHLORIDE 2 MG/1
2 CAPSULE ORAL 4 TIMES DAILY PRN
Qty: 16 CAPSULE | Refills: 0 | Status: SHIPPED | OUTPATIENT
Start: 2023-10-11 | End: 2023-10-21

## 2023-10-11 RX ADMIN — SODIUM CHLORIDE 1000 ML: 9 INJECTION, SOLUTION INTRAVENOUS at 10:49

## 2023-10-11 ASSESSMENT — PAIN DESCRIPTION - DESCRIPTORS: DESCRIPTORS: CRAMPING

## 2023-10-11 ASSESSMENT — LIFESTYLE VARIABLES
HOW MANY STANDARD DRINKS CONTAINING ALCOHOL DO YOU HAVE ON A TYPICAL DAY: 1 OR 2
HOW OFTEN DO YOU HAVE A DRINK CONTAINING ALCOHOL: 2-4 TIMES A MONTH

## 2023-10-11 ASSESSMENT — PAIN SCALES - GENERAL: PAINLEVEL_OUTOF10: 8

## 2023-10-11 ASSESSMENT — PAIN - FUNCTIONAL ASSESSMENT: PAIN_FUNCTIONAL_ASSESSMENT: 0-10

## 2023-10-11 ASSESSMENT — PAIN DESCRIPTION - LOCATION: LOCATION: ABDOMEN

## 2023-10-11 ASSESSMENT — PAIN DESCRIPTION - PAIN TYPE: TYPE: ACUTE PAIN

## 2023-10-11 NOTE — DISCHARGE INSTRUCTIONS
Thank you! Thank you for allowing me to care for you in the emergency department. It is my goal to provide you with excellent care. If you have not received excellent quality care, please ask to speak to the nurse manager. Please fill out the survey that will come to you by mail or email since we listen to your feedback! Below you will find a list of your tests from today's visit. Should you have any questions, please do not hesitate to call the emergency department.     Labs  Recent Results (from the past 12 hour(s))   CBC with Auto Differential    Collection Time: 10/11/23 10:45 AM   Result Value Ref Range    WBC 10.9 4.1 - 11.1 K/uL    RBC 5.54 4.10 - 5.70 M/uL    Hemoglobin 14.9 12.1 - 17.0 g/dL    Hematocrit 45.4 36.6 - 50.3 %    MCV 81.9 80.0 - 99.0 FL    MCH 26.9 26.0 - 34.0 PG    MCHC 32.8 30.0 - 36.5 g/dL    RDW 14.3 11.5 - 14.5 %    Platelets 860 728 - 967 K/uL    MPV 9.2 8.9 - 12.9 FL    Nucleated RBCs 0.0 0.0  WBC    nRBC 0.00 0.00 - 0.01 K/uL    Neutrophils % 53 32 - 75 %    Lymphocytes % 34 12 - 49 %    Monocytes % 8 5 - 13 %    Eosinophils % 3 0 - 7 %    Basophils % 1 0 - 1 %    Immature Granulocytes 1 (H) 0 - 0.5 %    Neutrophils Absolute 5.9 1.8 - 8.0 K/UL    Lymphocytes Absolute 3.7 (H) 0.8 - 3.5 K/UL    Monocytes Absolute 0.9 0.0 - 1.0 K/UL    Eosinophils Absolute 0.3 0.0 - 0.4 K/UL    Basophils Absolute 0.1 0.0 - 0.1 K/UL    Absolute Immature Granulocyte 0.1 (H) 0.00 - 0.04 K/UL    Differential Type AUTOMATED     CMP    Collection Time: 10/11/23 10:45 AM   Result Value Ref Range    Sodium 134 (L) 136 - 145 mmol/L    Potassium Hemolyzed, Recollection Recommended 3.5 - 5.1 mmol/L    Chloride 99 97 - 108 mmol/L    CO2 26 21 - 32 mmol/L    Anion Gap 9 5 - 15 mmol/L    Glucose 84 65 - 100 mg/dL    BUN 15 6 - 20 mg/dL    Creatinine 1.05 0.70 - 1.30 mg/dL    Bun/Cre Ratio 14 12 - 20      Est, Glom Filt Rate >60 >60 ml/min/1.73m2    Calcium 10.2 (H) 8.5 - 10.1 mg/dL    Total Bilirubin 0.4

## 2023-10-11 NOTE — ED PROVIDER NOTES
[facility] EMERGENCY DEPT  EMERGENCY DEPARTMENT HISTORY AND PHYSICAL EXAM      Date: 10/11/2023  Patient Name: Toma Garcia  MRN: [id number]  [address] [facility] 1975  Date of evaluation: 10/11/2023  Provider: Madi Dao PA-C   Note Started: 10:29 AM EDT 10/11/23    HISTORY OF PRESENT ILLNESS     Chief Complaint   Patient presents with    Abdominal Pain       History Provided By: Patient    HPI: Toma Garcia is a 51 y.o. male with past medical history listed below, arrives via EMS from work for diarrhea. Patient states that he is having cramping in his stomach and legs after 2 days of continuous diarrhea. Patient states that when he previously called EMS he was having abdominal cramping, however now denies any abdominal pain. He denies any bright red blood per rectum or melena. Patient states that he feels like he might have some electrolyte imbalance as he is losing a lot of water during episodes and unable to stay hydrated. Denies any shortness of breath, difficulty breathing, nausea/vomiting, constipation, rash, night sweats, or chest pain.      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

## 2023-10-11 NOTE — ED TRIAGE NOTES
Patient arrives via ems from work, patient has been having abd pain with diarrhea for 2 days denies n/v/fevers

## 2024-01-03 ENCOUNTER — OFFICE VISIT (OUTPATIENT)
Age: 49
End: 2024-01-03
Payer: COMMERCIAL

## 2024-01-03 VITALS
HEART RATE: 74 BPM | BODY MASS INDEX: 46.65 KG/M2 | HEIGHT: 69 IN | WEIGHT: 315 LBS | SYSTOLIC BLOOD PRESSURE: 184 MMHG | TEMPERATURE: 98 F | DIASTOLIC BLOOD PRESSURE: 91 MMHG | RESPIRATION RATE: 16 BRPM | OXYGEN SATURATION: 97 %

## 2024-01-03 DIAGNOSIS — E11.649 TYPE 2 DIABETES MELLITUS WITH HYPOGLYCEMIA WITHOUT COMA, UNSPECIFIED WHETHER LONG TERM INSULIN USE (HCC): Primary | ICD-10-CM

## 2024-01-03 DIAGNOSIS — E66.01 CLASS 3 SEVERE OBESITY DUE TO EXCESS CALORIES WITH SERIOUS COMORBIDITY AND BODY MASS INDEX (BMI) OF 45.0 TO 49.9 IN ADULT (HCC): ICD-10-CM

## 2024-01-03 DIAGNOSIS — E22.1 HYPERPROLACTINEMIA (HCC): ICD-10-CM

## 2024-01-03 DIAGNOSIS — I10 PRIMARY HYPERTENSION: ICD-10-CM

## 2024-01-03 DIAGNOSIS — D35.2 BENIGN NEOPLASM OF PITUITARY GLAND (HCC): ICD-10-CM

## 2024-01-03 PROBLEM — L97.512 FOOT ULCER WITH FAT LAYER EXPOSED, RIGHT (HCC): Status: ACTIVE | Noted: 2022-12-23

## 2024-01-03 PROBLEM — I96 GANGRENE OF LEFT FOOT (HCC): Status: ACTIVE | Noted: 2022-12-24

## 2024-01-03 LAB — GLUCOSE, POC: 449 MG/DL

## 2024-01-03 PROCEDURE — 82962 GLUCOSE BLOOD TEST: CPT | Performed by: STUDENT IN AN ORGANIZED HEALTH CARE EDUCATION/TRAINING PROGRAM

## 2024-01-03 PROCEDURE — 3077F SYST BP >= 140 MM HG: CPT | Performed by: STUDENT IN AN ORGANIZED HEALTH CARE EDUCATION/TRAINING PROGRAM

## 2024-01-03 PROCEDURE — 99205 OFFICE O/P NEW HI 60 MIN: CPT | Performed by: STUDENT IN AN ORGANIZED HEALTH CARE EDUCATION/TRAINING PROGRAM

## 2024-01-03 PROCEDURE — 3080F DIAST BP >= 90 MM HG: CPT | Performed by: STUDENT IN AN ORGANIZED HEALTH CARE EDUCATION/TRAINING PROGRAM

## 2024-01-03 RX ORDER — TIRZEPATIDE 2.5 MG/.5ML
2.5 INJECTION, SOLUTION SUBCUTANEOUS WEEKLY
Qty: 4 EACH | Refills: 1 | Status: SHIPPED | OUTPATIENT
Start: 2024-01-03

## 2024-01-03 RX ORDER — ACYCLOVIR 400 MG/1
TABLET ORAL
Qty: 9 EACH | Refills: 0 | Status: SHIPPED | OUTPATIENT
Start: 2024-01-03

## 2024-01-03 RX ORDER — KETOCONAZOLE 20 MG/G
CREAM TOPICAL
COMMUNITY
Start: 2023-12-26 | End: 2024-01-03

## 2024-01-03 RX ORDER — LACTOBACILLUS RHAMNOSUS GG 10B CELL
1 CAPSULE ORAL DAILY
COMMUNITY
Start: 2022-12-26 | End: 2024-01-03

## 2024-01-03 RX ORDER — CABERGOLINE 0.5 MG/1
0.5 TABLET ORAL
Qty: 8 TABLET | Refills: 1 | Status: SHIPPED | OUTPATIENT
Start: 2024-01-03

## 2024-01-03 NOTE — PATIENT INSTRUCTIONS
Stop ozempic. Switch to mounjaro to 2.5 mg/week if you tolerate this for 4 weeks- reach out to us and we will given prescription for 5mg/week   Stop Tresiba and Novolog. Start U500 insulin start with 70 units at 7 am, 2 pm and 8 pm- may have to increase   We will get you a glucose sensor

## 2024-01-03 NOTE — PROGRESS NOTES
[facility]                                                                                    Marci Pearson M.D          Patient Information  Date:1/3/2024  Name : Harinder Aldridge 48 y.o.     YOB: 1975         Referred by: Ron Loera APRN - NP       Chief Complaint   Patient presents with    Established New Doctor    Diabetes    Hypogonadism       History of Present Illness: Harinder Aldridge is a 48 y.o. male with type 2 diabetes, PTTD macroadenoma/prolactinoma, hypogonadism, hypertension and obesity who presented for follow-up.  He was following up with Dr. Balbuena (endocrinology) in [place].  Switching to us because of distance. Last seen by Dr. Balbuena in 1/2023       Type 2 diabetes mellitus    Glucometer reading:  Results for orders placed or performed in visit on 01/03/24   AMB POC GLUCOSE BLOOD, BY GLUCOSE MONITORING DEVICE   Result Value Ref Range    Glucose,  MG/DL         · Diagnosis: 2007  · Family history of diabetes Mellitus Gf, father   · Current treatment: Tresiba 75 units daily + Novolog 30-50 units with meals + ozempic + jardiance   · Past treatment: Metformin    · Glucose checks had dexcom   · Meals per day: 3  ---Breakfast : sausage biscuit  Has browns  ----Lunch :chicken wrap  ----Dinner: chicken mexican   -----Snacks: none   -----Drinks: unsweet tea, juice   · Exercise: none   Steroids:none   · DM related hospitalizations: non     Smoking: no  Family history of coronary artery disease/stroke: yes     Complications of DM:  · CAD: no  · CVA: no  · PVD: no  · Amputations: no   · Retinopathy:yes; last exam was 1/2024   · Gastropathy: no  · Nephropathy: no  · Neuropathy:  yes   Sees podiatrist: yes     Medications:  · Statin:   · ACE-I:   · ASA:     · Diabetes education:    Prolactinoma diagnosed 2014?   On cabergoline 0.25 mg twice a week.   No headache or vision changes.     Has been on testosterone twice a month.     Patient has 
Chief Complaint   Patient presents with    Established New Doctor    Diabetes    Hypogonadism     BP (!) 183/90 (Site: Left Upper Arm, Position: Sitting, Cuff Size: Large Adult)   Pulse 74   Temp 98 °F (36.7 °C) (Oral)   Resp 16   Ht 1.753 m (5' 9\")   Wt (!) 149.7 kg (330 lb)   SpO2 97%   BMI 48.73 kg/m²     BP (!) 184/91 (Site: Left Upper Arm, Position: Sitting, Cuff Size: Large Adult)   Pulse 74   Temp 98 °F (36.7 °C) (Oral)   Resp 16   Ht 1.753 m (5' 9\")   Wt (!) 149.7 kg (330 lb)   SpO2 97%   BMI 48.73 kg/m²     
None

## 2024-01-03 NOTE — TELEPHONE ENCOUNTER
Spoke with Lidia with dexcom she said to send it to his pharmacy and if there are any issues we can send to [facility] but first try his pharmacy

## 2024-02-07 ENCOUNTER — HOSPITAL ENCOUNTER (EMERGENCY)
Facility: HOSPITAL | Age: 49
Discharge: HOME | End: 2024-02-07
Payer: COMMERCIAL

## 2024-02-07 ENCOUNTER — APPOINTMENT (OUTPATIENT)
Facility: HOSPITAL | Age: 49
End: 2024-02-07
Payer: COMMERCIAL

## 2024-02-07 VITALS
WEIGHT: 315 LBS | BODY MASS INDEX: 46.65 KG/M2 | RESPIRATION RATE: 20 BRPM | OXYGEN SATURATION: 97 % | HEIGHT: 69 IN | HEART RATE: 70 BPM | TEMPERATURE: 98.2 F | SYSTOLIC BLOOD PRESSURE: 136 MMHG | DIASTOLIC BLOOD PRESSURE: 62 MMHG

## 2024-02-07 DIAGNOSIS — I15.9 SECONDARY HYPERTENSION: Primary | ICD-10-CM

## 2024-02-07 PROCEDURE — 93005 ELECTROCARDIOGRAM TRACING: CPT | Performed by: EMERGENCY MEDICINE

## 2024-02-07 PROCEDURE — 71046 X-RAY EXAM CHEST 2 VIEWS: CPT

## 2024-02-07 PROCEDURE — 99284 EMERGENCY DEPT VISIT MOD MDM: CPT

## 2024-02-07 ASSESSMENT — LIFESTYLE VARIABLES
HOW MANY STANDARD DRINKS CONTAINING ALCOHOL DO YOU HAVE ON A TYPICAL DAY: PATIENT DOES NOT DRINK
HOW OFTEN DO YOU HAVE A DRINK CONTAINING ALCOHOL: NEVER

## 2024-02-07 ASSESSMENT — PAIN - FUNCTIONAL ASSESSMENT
PAIN_FUNCTIONAL_ASSESSMENT: NONE - DENIES PAIN
PAIN_FUNCTIONAL_ASSESSMENT: 0-10

## 2024-02-07 ASSESSMENT — PAIN DESCRIPTION - LOCATION: LOCATION: HEAD

## 2024-02-07 ASSESSMENT — PAIN SCALES - GENERAL: PAINLEVEL_OUTOF10: 5

## 2024-02-07 NOTE — ED PROVIDER NOTES
sinus rhythm with first-degree block [TP]      ED Course User Index  [TP] Desmond Black, AUDRA       Clinical Management Tools:  Not Applicable    Sepsis Reassessment: Sepsis reassessment not applicable    Disposition Considerations (Tests not done, Shared Decision Making, Pt Expectation of Test or Treatment.): See MDM    Patient was given the following medications:  Medications - No data to display    CONSULTS: (Who and What was discussed)  None     Social Determinants affecting Dx or Tx: None    Smoking Cessation: Not Applicable    PROCEDURES   Unless otherwise noted above, none.  Procedures      CRITICAL CARE TIME   Patient does not meet Critical Care Time, 0 minutes    FINAL IMPRESSION     1. Secondary hypertension          DISPOSITION/PLAN   DISPOSITION Decision To Discharge 02/07/2024 02:02:05 PM    Discharge Note: The patient is stable for discharge home. The signs, symptoms, diagnosis, and discharge instructions have been discussed, understanding conveyed, and agreed upon. The patient is to follow up as recommended or return to ER should their symptoms worsen.      PATIENT REFERRED TO:  Ron Loera, APRN - NP  [id number] [place] [address]  [phone]    In 1 week  For repeat valuation, treatment recommendations and hypertension management        DISCHARGE MEDICATIONS:     Medication List        CONTINUE taking these medications      Dexcom G7 Sensor Misc  Use to check blood glucose 4 times a day     Insulin Pen Needle 32G X 4 MM Misc  Inject 5 times daily using a new needle for each insulin dose.            ASK your doctor about these medications      B COMPLEX 1 PO     cabergoline 0.5 MG tablet  Commonly known as: DOSTINEX  Take 1 tablet by mouth every 7 days     DULoxetine 60 MG extended release capsule  Commonly known as: CYMBALTA     empagliflozin 25 MG tablet  Commonly known as: Jardiance  Take 1 tablet by mouth daily     HumaLOG KwikPen 100 UNIT/ML Sopn  Generic drug:

## 2024-02-07 NOTE — ED NOTES
Pt instructed to follow up with PCP, pt displays understanding of DC paperwork and has no questions for myself or the provider. Pt ambulatory upon DC with a strong and steady gait, unassisted.

## 2024-02-08 LAB
EKG ATRIAL RATE: 71 BPM
EKG DIAGNOSIS: NORMAL
EKG P AXIS: 50 DEGREES
EKG P-R INTERVAL: 284 MS
EKG Q-T INTERVAL: 410 MS
EKG QRS DURATION: 90 MS
EKG QTC CALCULATION (BAZETT): 445 MS
EKG R AXIS: 33 DEGREES
EKG T AXIS: 136 DEGREES
EKG VENTRICULAR RATE: 71 BPM

## 2024-03-19 ENCOUNTER — OFFICE VISIT (OUTPATIENT)
Facility: CLINIC | Age: 49
End: 2024-03-19
Payer: COMMERCIAL

## 2024-03-19 VITALS
TEMPERATURE: 98.1 F | HEIGHT: 69 IN | WEIGHT: 315 LBS | RESPIRATION RATE: 18 BRPM | OXYGEN SATURATION: 97 % | BODY MASS INDEX: 46.65 KG/M2 | HEART RATE: 76 BPM | DIASTOLIC BLOOD PRESSURE: 93 MMHG | SYSTOLIC BLOOD PRESSURE: 185 MMHG

## 2024-03-19 DIAGNOSIS — E22.1 HYPERPROLACTINEMIA (HCC): ICD-10-CM

## 2024-03-19 DIAGNOSIS — F33.1 MODERATE EPISODE OF RECURRENT MAJOR DEPRESSIVE DISORDER (HCC): ICD-10-CM

## 2024-03-19 DIAGNOSIS — E78.2 MIXED HYPERLIPIDEMIA: ICD-10-CM

## 2024-03-19 DIAGNOSIS — I50.32 CHRONIC DIASTOLIC CONGESTIVE HEART FAILURE (HCC): ICD-10-CM

## 2024-03-19 DIAGNOSIS — E29.1 HYPOGONADISM MALE: ICD-10-CM

## 2024-03-19 DIAGNOSIS — G47.33 OSA ON CPAP: ICD-10-CM

## 2024-03-19 DIAGNOSIS — Z12.11 SCREEN FOR COLON CANCER: ICD-10-CM

## 2024-03-19 DIAGNOSIS — I10 PRIMARY HYPERTENSION: Primary | ICD-10-CM

## 2024-03-19 DIAGNOSIS — E11.319 TYPE 2 DIABETES MELLITUS WITH RETINOPATHY, WITHOUT LONG-TERM CURRENT USE OF INSULIN, MACULAR EDEMA PRESENCE UNSPECIFIED, UNSPECIFIED LATERALITY, UNSPECIFIED RETINOPATHY SEVERITY (HCC): ICD-10-CM

## 2024-03-19 LAB — HBA1C MFR BLD: 11 %

## 2024-03-19 PROCEDURE — 83036 HEMOGLOBIN GLYCOSYLATED A1C: CPT | Performed by: STUDENT IN AN ORGANIZED HEALTH CARE EDUCATION/TRAINING PROGRAM

## 2024-03-19 PROCEDURE — 3077F SYST BP >= 140 MM HG: CPT | Performed by: STUDENT IN AN ORGANIZED HEALTH CARE EDUCATION/TRAINING PROGRAM

## 2024-03-19 PROCEDURE — 99214 OFFICE O/P EST MOD 30 MIN: CPT | Performed by: STUDENT IN AN ORGANIZED HEALTH CARE EDUCATION/TRAINING PROGRAM

## 2024-03-19 PROCEDURE — 3080F DIAST BP >= 90 MM HG: CPT | Performed by: STUDENT IN AN ORGANIZED HEALTH CARE EDUCATION/TRAINING PROGRAM

## 2024-03-19 RX ORDER — DULOXETIN HYDROCHLORIDE 60 MG/1
60 CAPSULE, DELAYED RELEASE ORAL DAILY
Qty: 90 CAPSULE | Refills: 0 | Status: SHIPPED | OUTPATIENT
Start: 2024-03-19

## 2024-03-19 RX ORDER — TIRZEPATIDE 2.5 MG/.5ML
INJECTION, SOLUTION SUBCUTANEOUS
Qty: 2 ML | Refills: 1 | Status: SHIPPED | OUTPATIENT
Start: 2024-03-19 | End: 2024-03-19 | Stop reason: DRUGHIGH

## 2024-03-19 RX ORDER — BUPROPION HYDROCHLORIDE 150 MG/1
TABLET, EXTENDED RELEASE ORAL
Qty: 127 TABLET | Refills: 0 | Status: SHIPPED | OUTPATIENT
Start: 2024-03-19 | End: 2024-05-25

## 2024-03-19 RX ORDER — SPIRONOLACTONE 50 MG/1
50 TABLET, FILM COATED ORAL DAILY
Qty: 90 TABLET | Refills: 0 | Status: SHIPPED | OUTPATIENT
Start: 2024-03-19

## 2024-03-19 RX ORDER — ROSUVASTATIN CALCIUM 20 MG/1
20 TABLET, COATED ORAL NIGHTLY
Qty: 90 TABLET | Refills: 0 | Status: SHIPPED | OUTPATIENT
Start: 2024-03-19

## 2024-03-19 RX ORDER — AMLODIPINE BESYLATE 5 MG/1
5 TABLET ORAL DAILY
Qty: 30 TABLET | Refills: 2 | Status: SHIPPED | OUTPATIENT
Start: 2024-03-19

## 2024-03-19 RX ORDER — TIRZEPATIDE 5 MG/.5ML
5 INJECTION, SOLUTION SUBCUTANEOUS WEEKLY
Qty: 4 ADJUSTABLE DOSE PRE-FILLED PEN SYRINGE | Refills: 1 | Status: SHIPPED | OUTPATIENT
Start: 2024-03-19 | End: 2024-06-17

## 2024-03-19 RX ORDER — LOSARTAN POTASSIUM AND HYDROCHLOROTHIAZIDE 25; 100 MG/1; MG/1
1 TABLET ORAL DAILY
Qty: 90 TABLET | Refills: 0 | Status: SHIPPED | OUTPATIENT
Start: 2024-03-19

## 2024-03-19 SDOH — ECONOMIC STABILITY: FOOD INSECURITY: WITHIN THE PAST 12 MONTHS, YOU WORRIED THAT YOUR FOOD WOULD RUN OUT BEFORE YOU GOT MONEY TO BUY MORE.: NEVER TRUE

## 2024-03-19 SDOH — ECONOMIC STABILITY: INCOME INSECURITY: HOW HARD IS IT FOR YOU TO PAY FOR THE VERY BASICS LIKE FOOD, HOUSING, MEDICAL CARE, AND HEATING?: NOT VERY HARD

## 2024-03-19 SDOH — ECONOMIC STABILITY: HOUSING INSECURITY
IN THE LAST 12 MONTHS, WAS THERE A TIME WHEN YOU DID NOT HAVE A STEADY PLACE TO SLEEP OR SLEPT IN A SHELTER (INCLUDING NOW)?: NO

## 2024-03-19 SDOH — ECONOMIC STABILITY: FOOD INSECURITY: WITHIN THE PAST 12 MONTHS, THE FOOD YOU BOUGHT JUST DIDN'T LAST AND YOU DIDN'T HAVE MONEY TO GET MORE.: NEVER TRUE

## 2024-03-19 SDOH — HEALTH STABILITY: PHYSICAL HEALTH: ON AVERAGE, HOW MANY DAYS PER WEEK DO YOU ENGAGE IN MODERATE TO STRENUOUS EXERCISE (LIKE A BRISK WALK)?: 0 DAYS

## 2024-03-19 ASSESSMENT — PATIENT HEALTH QUESTIONNAIRE - PHQ9
1. LITTLE INTEREST OR PLEASURE IN DOING THINGS: NEARLY EVERY DAY
2. FEELING DOWN, DEPRESSED OR HOPELESS: NEARLY EVERY DAY
4. FEELING TIRED OR HAVING LITTLE ENERGY: NEARLY EVERY DAY
5. POOR APPETITE OR OVEREATING: NEARLY EVERY DAY
SUM OF ALL RESPONSES TO PHQ QUESTIONS 1-9: 21
9. THOUGHTS THAT YOU WOULD BE BETTER OFF DEAD, OR OF HURTING YOURSELF: SEVERAL DAYS
SUM OF ALL RESPONSES TO PHQ QUESTIONS 1-9: 20
10. IF YOU CHECKED OFF ANY PROBLEMS, HOW DIFFICULT HAVE THESE PROBLEMS MADE IT FOR YOU TO DO YOUR WORK, TAKE CARE OF THINGS AT HOME, OR GET ALONG WITH OTHER PEOPLE: VERY DIFFICULT
SUM OF ALL RESPONSES TO PHQ9 QUESTIONS 1 & 2: 6
3. TROUBLE FALLING OR STAYING ASLEEP: NEARLY EVERY DAY
SUM OF ALL RESPONSES TO PHQ QUESTIONS 1-9: 21
SUM OF ALL RESPONSES TO PHQ QUESTIONS 1-9: 21
7. TROUBLE CONCENTRATING ON THINGS, SUCH AS READING THE NEWSPAPER OR WATCHING TELEVISION: MORE THAN HALF THE DAYS
8. MOVING OR SPEAKING SO SLOWLY THAT OTHER PEOPLE COULD HAVE NOTICED. OR THE OPPOSITE, BEING SO FIGETY OR RESTLESS THAT YOU HAVE BEEN MOVING AROUND A LOT MORE THAN USUAL: SEVERAL DAYS
6. FEELING BAD ABOUT YOURSELF - OR THAT YOU ARE A FAILURE OR HAVE LET YOURSELF OR YOUR FAMILY DOWN: MORE THAN HALF THE DAYS

## 2024-03-19 ASSESSMENT — ENCOUNTER SYMPTOMS
ABDOMINAL PAIN: 0
SHORTNESS OF BREATH: 0

## 2024-03-19 ASSESSMENT — COLUMBIA-SUICIDE SEVERITY RATING SCALE - C-SSRS
6. HAVE YOU EVER DONE ANYTHING, STARTED TO DO ANYTHING, OR PREPARED TO DO ANYTHING TO END YOUR LIFE?: NO
1. WITHIN THE PAST MONTH, HAVE YOU WISHED YOU WERE DEAD OR WISHED YOU COULD GO TO SLEEP AND NOT WAKE UP?: YES
2. HAVE YOU ACTUALLY HAD ANY THOUGHTS OF KILLING YOURSELF?: NO

## 2024-03-19 NOTE — PROGRESS NOTES
Chief Complaint   Patient presents with    New Patient    Medication Refill    Depression    Hypertension     BP (!) 185/93 (Site: Left Upper Arm)   Pulse 76   Temp 98.1 °F (36.7 °C) (Temporal)   Resp 18   Ht 1.753 m (5' 9\")   Wt (!) 156.6 kg (345 lb 3.2 oz)   SpO2 97%   BMI 50.98 kg/m²       \"Have you been to the ER, urgent care clinic since your last visit?  Hospitalized since your last visit?\"    YES - When: approximately 1 months ago.  Where and Why: hypertensive crisis.    “Have you seen or consulted any other health care providers outside of [facility] since your last visit?”    YES - When: approximately 1 months ago.  Where and Why:  [facility] Aguilar [facility]        “Have you had a colorectal cancer screening such as a colonoscopy/FIT/Cologuard?    NO    Date of last Colonoscopy: 7/1/2014  No cologuard on file  No FIT/FOBT on file   No flexible sigmoidoscopy on file         Click Here for Release of Records Request  
High Cholesterol Brother     Mental Illness Brother     Substance Abuse Brother     Breast Cancer Maternal Grandmother     Stroke Maternal Grandmother     Substance Abuse Maternal Grandmother     Early Death Paternal Grandmother     Heart Disease Paternal Grandmother     Obesity Paternal Grandmother     Diabetes Maternal Uncle     Early Death Maternal Uncle     Heart Disease Maternal Uncle     High Blood Pressure Maternal Uncle     Obesity Maternal Uncle     Substance Abuse Maternal Uncle      Social History     Tobacco Use   Smoking Status Never   Smokeless Tobacco Never         Review of Systems   Constitutional:  Negative for fever.   Respiratory:  Negative for shortness of breath.    Cardiovascular:  Negative for chest pain.   Gastrointestinal:  Negative for abdominal pain.   Neurological:  Negative for syncope.   Psychiatric/Behavioral:  Negative for agitation and behavioral problems.            Vitals:    03/19/24 1044 03/19/24 1104   BP: (!) 180/99 (!) 185/93   Site: Left Upper Arm Left Upper Arm   Pulse: 76    Resp: 18    Temp: 98.1 °F (36.7 °C)    TempSrc: Temporal    SpO2: 97%    Weight: (!) 156.6 kg (345 lb 3.2 oz)    Height: 1.753 m (5' 9\")       Physical Exam  Constitutional:       Appearance: Normal appearance.   Cardiovascular:      Rate and Rhythm: Normal rate and regular rhythm.   Pulmonary:      Effort: Pulmonary effort is normal.      Breath sounds: Normal breath sounds.   Neurological:      General: No focal deficit present.      Mental Status: He is alert. Mental status is at baseline.   Psychiatric:         Mood and Affect: Mood normal.         Behavior: Behavior normal.                 An electronic signature was used to authenticate this note.  -- Pilar Varela MD

## 2024-05-01 ENCOUNTER — APPOINTMENT (OUTPATIENT)
Facility: HOSPITAL | Age: 49
End: 2024-05-01
Payer: COMMERCIAL

## 2024-05-01 ENCOUNTER — HOSPITAL ENCOUNTER (EMERGENCY)
Facility: HOSPITAL | Age: 49
Discharge: HOME | End: 2024-05-01
Payer: COMMERCIAL

## 2024-05-01 VITALS
HEIGHT: 69 IN | TEMPERATURE: 98.6 F | OXYGEN SATURATION: 100 % | WEIGHT: 315 LBS | SYSTOLIC BLOOD PRESSURE: 142 MMHG | RESPIRATION RATE: 18 BRPM | HEART RATE: 81 BPM | DIASTOLIC BLOOD PRESSURE: 88 MMHG | BODY MASS INDEX: 46.65 KG/M2

## 2024-05-01 DIAGNOSIS — L03.221 CELLULITIS OF NECK: Primary | ICD-10-CM

## 2024-05-01 LAB
ALBUMIN SERPL-MCNC: 3.4 G/DL (ref 3.5–5)
ALBUMIN/GLOB SERPL: 0.8 (ref 1.1–2.2)
ALP SERPL-CCNC: 85 U/L (ref 45–117)
ALT SERPL-CCNC: 20 U/L (ref 12–78)
ANION GAP SERPL CALC-SCNC: 7 MMOL/L (ref 5–15)
AST SERPL W P-5'-P-CCNC: 11 U/L (ref 15–37)
BASOPHILS # BLD: 0.1 K/UL (ref 0–0.1)
BASOPHILS NFR BLD: 1 % (ref 0–1)
BILIRUB SERPL-MCNC: 0.4 MG/DL (ref 0.2–1)
BUN SERPL-MCNC: 15 MG/DL (ref 6–20)
BUN/CREAT SERPL: 15 (ref 12–20)
CA-I BLD-MCNC: 10 MG/DL (ref 8.5–10.1)
CHLORIDE SERPL-SCNC: 99 MMOL/L (ref 97–108)
CO2 SERPL-SCNC: 27 MMOL/L (ref 21–32)
CREAT SERPL-MCNC: 0.98 MG/DL (ref 0.7–1.3)
DIFFERENTIAL METHOD BLD: ABNORMAL
EOSINOPHIL # BLD: 0.1 K/UL (ref 0–0.4)
EOSINOPHIL NFR BLD: 1 % (ref 0–7)
ERYTHROCYTE [DISTWIDTH] IN BLOOD BY AUTOMATED COUNT: 14.1 % (ref 11.5–14.5)
GLOBULIN SER CALC-MCNC: 4.4 G/DL (ref 2–4)
GLUCOSE BLD STRIP.AUTO-MCNC: 114 MG/DL (ref 65–100)
GLUCOSE SERPL-MCNC: 130 MG/DL (ref 65–100)
HCT VFR BLD AUTO: 43.8 % (ref 36.6–50.3)
HGB BLD-MCNC: 14.9 G/DL (ref 12.1–17)
IMM GRANULOCYTES # BLD AUTO: 0.1 K/UL (ref 0–0.04)
IMM GRANULOCYTES NFR BLD AUTO: 1 % (ref 0–0.5)
LACTATE BLD-SCNC: 1.97 MMOL/L (ref 0.4–2)
LIPASE SERPL-CCNC: 46 U/L (ref 13–75)
LYMPHOCYTES # BLD: 2.5 K/UL (ref 0.8–3.5)
LYMPHOCYTES NFR BLD: 19 % (ref 12–49)
MCH RBC QN AUTO: 27.2 PG (ref 26–34)
MCHC RBC AUTO-ENTMCNC: 34 G/DL (ref 30–36.5)
MCV RBC AUTO: 80.1 FL (ref 80–99)
MONOCYTES # BLD: 0.9 K/UL (ref 0–1)
MONOCYTES NFR BLD: 7 % (ref 5–13)
NEUTS SEG # BLD: 9.3 K/UL (ref 1.8–8)
NEUTS SEG NFR BLD: 71 % (ref 32–75)
NRBC # BLD: 0 K/UL (ref 0–0.01)
NRBC BLD-RTO: 0 PER 100 WBC
PERFORMED BY:: ABNORMAL
PERFORMED BY:: NORMAL
PLATELET # BLD AUTO: 320 K/UL (ref 150–400)
PMV BLD AUTO: 9.1 FL (ref 8.9–12.9)
POTASSIUM SERPL-SCNC: 4.3 MMOL/L (ref 3.5–5.1)
PROCALCITONIN SERPL-MCNC: 0.06 NG/ML
PROT SERPL-MCNC: 7.8 G/DL (ref 6.4–8.2)
RBC # BLD AUTO: 5.47 M/UL (ref 4.1–5.7)
SODIUM SERPL-SCNC: 133 MMOL/L (ref 136–145)
WBC # BLD AUTO: 13 K/UL (ref 4.1–11.1)

## 2024-05-01 PROCEDURE — 99285 EMERGENCY DEPT VISIT HI MDM: CPT

## 2024-05-01 PROCEDURE — 87040 BLOOD CULTURE FOR BACTERIA: CPT

## 2024-05-01 PROCEDURE — 6370000000 HC RX 637 (ALT 250 FOR IP)

## 2024-05-01 PROCEDURE — 80053 COMPREHEN METABOLIC PANEL: CPT

## 2024-05-01 PROCEDURE — 83605 ASSAY OF LACTIC ACID: CPT

## 2024-05-01 PROCEDURE — 85025 COMPLETE CBC W/AUTO DIFF WBC: CPT

## 2024-05-01 PROCEDURE — 83690 ASSAY OF LIPASE: CPT

## 2024-05-01 PROCEDURE — 84145 PROCALCITONIN (PCT): CPT

## 2024-05-01 PROCEDURE — 6360000004 HC RX CONTRAST MEDICATION

## 2024-05-01 PROCEDURE — 82962 GLUCOSE BLOOD TEST: CPT

## 2024-05-01 PROCEDURE — 70491 CT SOFT TISSUE NECK W/DYE: CPT

## 2024-05-01 PROCEDURE — 36415 COLL VENOUS BLD VENIPUNCTURE: CPT

## 2024-05-01 RX ORDER — ONDANSETRON 2 MG/ML
4 INJECTION INTRAMUSCULAR; INTRAVENOUS ONCE
Status: DISCONTINUED | OUTPATIENT
Start: 2024-05-01 | End: 2024-05-01

## 2024-05-01 RX ORDER — ONDANSETRON 4 MG/1
4 TABLET, ORALLY DISINTEGRATING ORAL
Status: DISCONTINUED | OUTPATIENT
Start: 2024-05-01 | End: 2024-05-01 | Stop reason: HOSPADM

## 2024-05-01 RX ORDER — CYCLOBENZAPRINE HCL 10 MG
10 TABLET ORAL 3 TIMES DAILY PRN
Status: DISCONTINUED | OUTPATIENT
Start: 2024-05-01 | End: 2024-05-01 | Stop reason: HOSPADM

## 2024-05-01 RX ORDER — CEPHALEXIN 500 MG/1
500 CAPSULE ORAL 4 TIMES DAILY
Qty: 40 CAPSULE | Refills: 0 | Status: SHIPPED | OUTPATIENT
Start: 2024-05-01 | End: 2024-05-11

## 2024-05-01 RX ADMIN — CYCLOBENZAPRINE 10 MG: 10 TABLET, FILM COATED ORAL at 15:28

## 2024-05-01 RX ADMIN — IOPAMIDOL 100 ML: 755 INJECTION, SOLUTION INTRAVENOUS at 16:57

## 2024-05-01 ASSESSMENT — PAIN - FUNCTIONAL ASSESSMENT: PAIN_FUNCTIONAL_ASSESSMENT: 0-10

## 2024-05-01 ASSESSMENT — PAIN DESCRIPTION - ORIENTATION: ORIENTATION: LEFT;LOWER

## 2024-05-01 ASSESSMENT — PAIN SCALES - GENERAL
PAINLEVEL_OUTOF10: 3
PAINLEVEL_OUTOF10: 5
PAINLEVEL_OUTOF10: 2

## 2024-05-01 ASSESSMENT — PAIN DESCRIPTION - LOCATION: LOCATION: NECK;ABDOMEN

## 2024-05-01 ASSESSMENT — PAIN DESCRIPTION - DESCRIPTORS: DESCRIPTORS: DULL

## 2024-05-01 NOTE — DISCHARGE INSTRUCTIONS
Thank you!  Thank you for allowing me to care for you in the emergency department. It is my goal to provide you with excellent care.  Please fill out the survey that will come to you by mail or email since we listen to your feedback!     Below you will find a list of your tests from today's visit.  Should you have any questions, please do not hesitate to call the emergency department.    Labs  Recent Results (from the past 12 hour(s))   CBC with Auto Differential    Collection Time: 05/01/24  2:11 PM   Result Value Ref Range    WBC 13.0 (H) 4.1 - 11.1 K/uL    RBC 5.47 4.10 - 5.70 M/uL    Hemoglobin 14.9 12.1 - 17.0 g/dL    Hematocrit 43.8 36.6 - 50.3 %    MCV 80.1 80.0 - 99.0 FL    MCH 27.2 26.0 - 34.0 PG    MCHC 34.0 30.0 - 36.5 g/dL    RDW 14.1 11.5 - 14.5 %    Platelets 320 150 - 400 K/uL    MPV 9.1 8.9 - 12.9 FL    Nucleated RBCs 0.0 0.0  WBC    nRBC 0.00 0.00 - 0.01 K/uL    Neutrophils % 71 32 - 75 %    Lymphocytes % 19 12 - 49 %    Monocytes % 7 5 - 13 %    Eosinophils % 1 0 - 7 %    Basophils % 1 0 - 1 %    Immature Granulocytes % 1 (H) 0 - 0.5 %    Neutrophils Absolute 9.3 (H) 1.8 - 8.0 K/UL    Lymphocytes Absolute 2.5 0.8 - 3.5 K/UL    Monocytes Absolute 0.9 0.0 - 1.0 K/UL    Eosinophils Absolute 0.1 0.0 - 0.4 K/UL    Basophils Absolute 0.1 0.0 - 0.1 K/UL    Immature Granulocytes Absolute 0.1 (H) 0.00 - 0.04 K/UL    Differential Type AUTOMATED     Comprehensive Metabolic Panel    Collection Time: 05/01/24  2:11 PM   Result Value Ref Range    Sodium 133 (L) 136 - 145 mmol/L    Potassium 4.3 3.5 - 5.1 mmol/L    Chloride 99 97 - 108 mmol/L    CO2 27 21 - 32 mmol/L    Anion Gap 7 5 - 15 mmol/L    Glucose 130 (H) 65 - 100 mg/dL    BUN 15 6 - 20 mg/dL    Creatinine 0.98 0.70 - 1.30 mg/dL    Bun/Cre Ratio 15 12 - 20      Est, Glom Filt Rate >90 >60 ml/min/1.73m2    Calcium 10.0 8.5 - 10.1 mg/dL    Total Bilirubin 0.4 0.2 - 1.0 mg/dL    AST 11 (L) 15 - 37 U/L    ALT 20 12 - 78 U/L    Alk Phosphatase 85

## 2024-05-01 NOTE — ED PROVIDER NOTES
"Pharmacy comments:    \"Flovent rejected, needs PA. Thank you.\"  " [facility] EMERGENCY DEPT  EMERGENCY DEPARTMENT HISTORY AND PHYSICAL EXAM      Date: 5/1/2024  Patient Name: Harinder Aldridge  MRN: [id number]  YOB: 1975  Date of evaluation: 5/1/2024  Provider: Aurora Huston PA-C   Note Started: 1:40 PM EDT 5/1/24    HISTORY OF PRESENT ILLNESS     Chief Complaint   Patient presents with    Cellulitis       History Provided By: Patient    HPI: Harinder Aldridge is a 48 y.o. male with PMH as described below who presents ambulatory to the ED for cellulitis on right posterior neck.  He states that he first noticed a scab in the area on 4/27/24 that he believes is secondary to his CPAP rubbing his neck.  The area then became increasingly painful, erythematous, and swollen.  He was seen at a local urgent care yesterday and was started on a 2-week course of oral doxycycline which she has been taking as prescribed.  Urgent care did not draw labs or do imaging.  Patient states that he was not febrile until this morning when his temperature was 101 °F.  He took oral acetaminophen to bring the fever down.  Additionally, he states that he is nauseated and vomited once prior to arrival.  No hemoptysis.  He does not think this is secondary to the doxycycline as he has taken this antibiotic for cellulitis previously without side effects.  He denies abdominal pain, diarrhea, or constipation.  He additionally denies cough, congestion, weakness, dizziness, headaches,  symptoms, SOB, or CP.    PAST MEDICAL HISTORY   Past Medical History:  Past Medical History:   Diagnosis Date    Anxiety     Arthritis     Cellulitis 12/2022    CHF (congestive heart failure) ([facility])     COVID-19 01/2022    Depression     Diabetic neuropathy (HCC)     DM (diabetes mellitus) (HCC)     Erectile dysfunction     GERD (gastroesophageal reflux disease)     Headache     Hearing loss     HTN (hypertension)     Hyperprolactinemia (HCC)     Hypogonadotropic hypogonadism (HCC)     Hypothyroidism     Neuropathy     Nicotine

## 2024-05-01 NOTE — ED TRIAGE NOTES
C/o cellulitis to right side of the neck. Presents with redness and irritation the the area with slight bruising. Reports feeling nauseous starting today as well as a fever. Currently on antibiotics

## 2024-05-05 LAB
ACCESSION NUMBER, LLC1M: NORMAL
ACINETOBACTER CALCOAC BAUMANNII COMPLEX BY PCR: NOT DETECTED
BACTERIA SPEC CULT: NORMAL
BACTERIA SPEC CULT: NORMAL
BACTEROIDES FRAGILIS BY PCR: NOT DETECTED
BIOFIRE TEST COMMENT: NORMAL
CANDIDA ALBICANS BY PCR: NOT DETECTED
CANDIDA AURIS BY PCR: NOT DETECTED
CANDIDA GLABRATA: NOT DETECTED
CANDIDA KRUSEI BY PCR: NOT DETECTED
CANDIDA PARAPSILOSIS BY PCR: NOT DETECTED
CANDIDA TROPICALIS BY PCR: NOT DETECTED
CRYPTOCOCCUS NEOFORMANS/GATTII BY PCR: NOT DETECTED
ENTEROBACTER CLOACAE COMPLEX BY PCR: NOT DETECTED
ENTEROBACTERALES BY PCR: NOT DETECTED
ENTEROCOCCUS FAECALIS BY PCR: NOT DETECTED
ENTEROCOCCUS FAECIUM BY PCR: NOT DETECTED
ESCHERICHIA COLI: NOT DETECTED
HAEMOPHILUS INFLUENZAE BY PCR: NOT DETECTED
KLEBSIELLA AEROGENES BY PCR: NOT DETECTED
KLEBSIELLA OXYTOCA BY PCR: NOT DETECTED
KLEBSIELLA PNEUMONIAE GROUP BY PCR: NOT DETECTED
LISTERIA MONOCYTOGENES BY PCR: NOT DETECTED
Lab: NORMAL
Lab: NORMAL
NEISSERIA MENINGITIDIS BY PCR: NOT DETECTED
PROTEUS BY PCR: NOT DETECTED
PSEUDOMONAS AERUGINOSA, PSAEP: NOT DETECTED
RESISTANT GENE TARGETS: NORMAL
SALMONELLA SPECIES BY PCR: NOT DETECTED
SERRATIA MARCESCENS BY PCR: NOT DETECTED
STAPHYLOCOCCUS AUREUS: NOT DETECTED
STAPHYLOCOCCUS EPIDERMIDIS BY PCR: NOT DETECTED
STAPHYLOCOCCUS LUGDUNENSIS BY PCR: NOT DETECTED
STAPHYLOCOCCUS: NOT DETECTED
STENOTROPHOMONAS MALTOPHILIA BY PCR: NOT DETECTED
STREPTOCOCCUS AGALACTIAE (GROUP B): NOT DETECTED
STREPTOCOCCUS PNEUMONIAE , SPNP: NOT DETECTED
STREPTOCOCCUS PYOGENES (GROUP A), SPYOP: NOT DETECTED
STREPTOCOCCUS: NOT DETECTED

## 2024-05-07 LAB
BACTERIA SPEC CULT: NORMAL
Lab: NORMAL

## 2024-05-12 LAB
BACTERIA SPEC CULT: NORMAL
Lab: NORMAL

## 2024-05-17 ENCOUNTER — OFFICE VISIT (OUTPATIENT)
Age: 49
End: 2024-05-17
Payer: COMMERCIAL

## 2024-05-17 VITALS
RESPIRATION RATE: 18 BRPM | DIASTOLIC BLOOD PRESSURE: 77 MMHG | OXYGEN SATURATION: 97 % | HEART RATE: 78 BPM | HEIGHT: 69 IN | BODY MASS INDEX: 46.65 KG/M2 | SYSTOLIC BLOOD PRESSURE: 137 MMHG | WEIGHT: 315 LBS | TEMPERATURE: 96.8 F

## 2024-05-17 DIAGNOSIS — I10 PRIMARY HYPERTENSION: ICD-10-CM

## 2024-05-17 DIAGNOSIS — E78.2 MIXED HYPERLIPIDEMIA: ICD-10-CM

## 2024-05-17 DIAGNOSIS — E22.1 HYPERPROLACTINEMIA (HCC): ICD-10-CM

## 2024-05-17 DIAGNOSIS — E11.649 TYPE 2 DIABETES MELLITUS WITH HYPOGLYCEMIA WITHOUT COMA, UNSPECIFIED WHETHER LONG TERM INSULIN USE (HCC): Primary | ICD-10-CM

## 2024-05-17 DIAGNOSIS — E66.01 CLASS 3 SEVERE OBESITY DUE TO EXCESS CALORIES WITH SERIOUS COMORBIDITY AND BODY MASS INDEX (BMI) OF 45.0 TO 49.9 IN ADULT (HCC): ICD-10-CM

## 2024-05-17 DIAGNOSIS — E11.319 TYPE 2 DIABETES MELLITUS WITH RETINOPATHY, WITHOUT LONG-TERM CURRENT USE OF INSULIN, MACULAR EDEMA PRESENCE UNSPECIFIED, UNSPECIFIED LATERALITY, UNSPECIFIED RETINOPATHY SEVERITY (HCC): ICD-10-CM

## 2024-05-17 PROCEDURE — 3078F DIAST BP <80 MM HG: CPT | Performed by: STUDENT IN AN ORGANIZED HEALTH CARE EDUCATION/TRAINING PROGRAM

## 2024-05-17 PROCEDURE — 3075F SYST BP GE 130 - 139MM HG: CPT | Performed by: STUDENT IN AN ORGANIZED HEALTH CARE EDUCATION/TRAINING PROGRAM

## 2024-05-17 PROCEDURE — 99214 OFFICE O/P EST MOD 30 MIN: CPT | Performed by: STUDENT IN AN ORGANIZED HEALTH CARE EDUCATION/TRAINING PROGRAM

## 2024-05-17 RX ORDER — BLOOD SUGAR DIAGNOSTIC
STRIP MISCELLANEOUS
Qty: 400 EACH | Refills: 3 | Status: SHIPPED | OUTPATIENT
Start: 2024-05-17

## 2024-05-17 RX ORDER — ROSUVASTATIN CALCIUM 20 MG/1
20 TABLET, COATED ORAL NIGHTLY
Qty: 90 TABLET | Refills: 0 | Status: SHIPPED | OUTPATIENT
Start: 2024-05-17

## 2024-05-17 RX ORDER — TIRZEPATIDE 5 MG/.5ML
5 INJECTION, SOLUTION SUBCUTANEOUS WEEKLY
Qty: 4 ADJUSTABLE DOSE PRE-FILLED PEN SYRINGE | Refills: 0 | Status: SHIPPED | OUTPATIENT
Start: 2024-05-17 | End: 2024-08-15

## 2024-05-17 NOTE — PROGRESS NOTES
-----------------------------  [employer] Clarity  -----------------------------  Harinder Aldridge  YOB: 1975  Generated at: Fri, May 17, 2024 10:37 AM EDT  Reporting period: Sat May 4, 2024 - Fri May 17, 2024  -----------------------------  Glucose Details  Average glucose: 214 mg/dL  Standard deviation: 63 mg/dL  GMI: N/A  -----------------------------  Time in Range  Very High: 28%  High: 38%  In Range: 34%  Low: 0%  Very Low: 0%    Target Range   mg/dL    -----------------------------  CGM Details  Sensor usage: 86%  Days with CGM data: 12/14    
Chief Complaint   Patient presents with    Follow-up    Diabetes     DM-2     /77 (Site: Left Lower Arm, Position: Sitting, Cuff Size: Medium Adult)   Pulse 78   Temp 96.8 °F (36 °C) (Temporal)   Resp 18   Ht 1.753 m (5' 9\")   Wt (!) 148.8 kg (328 lb)   SpO2 97%   BMI 48.44 kg/m²     
Transportation (Non-Medical): No   Physical Activity: Unknown (3/19/2024)    Exercise Vital Sign     Days of Exercise per Week: 0 days     Minutes of Exercise per Session: Not on file   Stress: Not on file   Social Connections: Not on file   Intimate Partner Violence: Not on file   Housing Stability: Unknown (3/19/2024)    Housing Stability Vital Sign     Unable to Pay for Housing in the Last Year: Not on file     Number of Places Lived in the Last Year: Not on file     Unstable Housing in the Last Year: No       Family History   Problem Relation Age of Onset    Hypertension Mother     Cancer Mother     High Blood Pressure Mother     Alcohol Abuse Mother     Depression Mother     Early Death Mother     High Cholesterol Mother     Substance Abuse Mother     Diabetes Father     Diabetes type 2  Father     Alcohol Abuse Father     Depression Father     Early Death Father     High Cholesterol Father     Substance Abuse Father     Diabetes Paternal Grandfather     Diabetes Maternal Grandfather     Early Death Maternal Grandfather     Heart Attack Maternal Grandfather     Heart Disease Maternal Grandfather     Obesity Maternal Grandfather     Hypertension Brother     Obesity Brother     Depression Brother     High Blood Pressure Brother     High Cholesterol Brother     Mental Illness Brother     Substance Abuse Brother     Breast Cancer Maternal Grandmother     Stroke Maternal Grandmother     Substance Abuse Maternal Grandmother     Early Death Paternal Grandmother     Heart Disease Paternal Grandmother     Obesity Paternal Grandmother     Diabetes Maternal Uncle     Early Death Maternal Uncle     Heart Disease Maternal Uncle     High Blood Pressure Maternal Uncle     Obesity Maternal Uncle     Substance Abuse Maternal Uncle         Current Outpatient Medications   Medication Sig    Tirzepatide (MOUNJARO) 5 MG/0.5ML SOPN SC injection Inject 0.5 mLs into the skin once a week    insulin regular human (HUMULIN R U-500

## 2024-05-24 DIAGNOSIS — F33.1 MODERATE EPISODE OF RECURRENT MAJOR DEPRESSIVE DISORDER (HCC): ICD-10-CM

## 2024-05-24 RX ORDER — INSULIN HUMAN 500 [IU]/ML
INJECTION, SOLUTION SUBCUTANEOUS
Qty: 12 ML | Refills: 2 | OUTPATIENT
Start: 2024-05-24

## 2024-05-24 RX ORDER — BUPROPION HYDROCHLORIDE 150 MG/1
150 TABLET, EXTENDED RELEASE ORAL 2 TIMES DAILY
Qty: 180 TABLET | Refills: 1 | Status: SHIPPED | OUTPATIENT
Start: 2024-05-24

## 2024-06-10 DIAGNOSIS — E11.319 TYPE 2 DIABETES MELLITUS WITH RETINOPATHY, WITHOUT LONG-TERM CURRENT USE OF INSULIN, MACULAR EDEMA PRESENCE UNSPECIFIED, UNSPECIFIED LATERALITY, UNSPECIFIED RETINOPATHY SEVERITY (HCC): ICD-10-CM

## 2024-06-10 RX ORDER — TIRZEPATIDE 5 MG/.5ML
INJECTION, SOLUTION SUBCUTANEOUS
Qty: 2 ML | Refills: 11 | Status: SHIPPED | OUTPATIENT
Start: 2024-06-10

## 2024-06-27 ENCOUNTER — OFFICE VISIT (OUTPATIENT)
Age: 49
End: 2024-06-27
Payer: COMMERCIAL

## 2024-06-27 VITALS — BODY MASS INDEX: 50.5 KG/M2 | WEIGHT: 315 LBS

## 2024-06-27 DIAGNOSIS — E11.65 TYPE 2 DIABETES MELLITUS WITH HYPERGLYCEMIA, WITH LONG-TERM CURRENT USE OF INSULIN (HCC): Primary | ICD-10-CM

## 2024-06-27 DIAGNOSIS — Z79.4 TYPE 2 DIABETES MELLITUS WITH HYPERGLYCEMIA, WITH LONG-TERM CURRENT USE OF INSULIN (HCC): Primary | ICD-10-CM

## 2024-06-27 DIAGNOSIS — E11.65 TYPE 2 DIABETES MELLITUS WITH HYPERGLYCEMIA, WITHOUT LONG-TERM CURRENT USE OF INSULIN (HCC): ICD-10-CM

## 2024-06-27 PROCEDURE — G0108 DIAB MANAGE TRN  PER INDIV: HCPCS | Performed by: DIETITIAN, REGISTERED

## 2024-06-27 NOTE — PROGRESS NOTES
Active Score: 5.0  Low: Confidence(Q5),Preparedness(Q2)     Skills/Knowledge Questions   1. I know how to plan meals that have the best balance between carbohydrates, proteins and vegetables. 6   2. I know how my diabetes medications (pills, injectables and/or insulin) work in my body. 5   3. I know when to check my blood sugar if I want to see how my body responded to a meal. 4   4. I know when to check my blood sugars to determine if my medication or insulin doses are correct. 2   5. I know what to do to prevent a low blood sugar when I exercise (either before, during, or after). 4   6. When I am sick, I know what to do differently with my diabetes management. 5   7. I know how stress can affect my diabetes management. 5   8. When I look at my blood sugars over a given week, I can explain what my blood sugar pattern is. 4   9. I know what my target levels are for A1c, blood pressure and cholesterol. 4   Confidence Questions   1. I am confident that I can plan balanced meals and snacks. 6   2. I am confident that I can manage my stress. 5   3. I am confident that I can prevent a low blood sugar during or after exercise. 6   4. I am confident that the next time I eat out, I will be able to choose foods that best keep my blood sugars in target. 3   5. I am confident I can include exercise into my schedule. 5   6. I am confident that I can use my daily blood sugars to adjust my diet, my activity, and/or my insulin. 6   7. When something out of my normal routine happens, I am confident that I can problem-solve and keep my diabetes on track. 5   Preparedness Questions   1. Within the next month, I will begin to eat more balanced meals and snacks. 6   2. Within the next month, I will choose an exercise activity and I will start fitting it into my schedule. 5   3. Within the next month, I will make a list of stress management options that work for me. 6   4. Within the next month, I will consistently plan ahead to prevent

## 2024-07-14 RX ORDER — CABERGOLINE 0.5 MG/1
0.5 TABLET ORAL
Qty: 8 TABLET | Refills: 0 | Status: SHIPPED | OUTPATIENT
Start: 2024-07-14

## 2024-07-15 DIAGNOSIS — E11.319 TYPE 2 DIABETES MELLITUS WITH RETINOPATHY, WITHOUT LONG-TERM CURRENT USE OF INSULIN, MACULAR EDEMA PRESENCE UNSPECIFIED, UNSPECIFIED LATERALITY, UNSPECIFIED RETINOPATHY SEVERITY (HCC): ICD-10-CM

## 2024-07-15 DIAGNOSIS — E78.2 MIXED HYPERLIPIDEMIA: ICD-10-CM

## 2024-07-15 RX ORDER — ACYCLOVIR 400 MG/1
TABLET ORAL
Qty: 9 EACH | Refills: 0 | OUTPATIENT
Start: 2024-07-15

## 2024-07-15 RX ORDER — TIRZEPATIDE 5 MG/.5ML
INJECTION, SOLUTION SUBCUTANEOUS
Qty: 2 ML | Refills: 11 | OUTPATIENT
Start: 2024-07-15

## 2024-07-15 RX ORDER — ROSUVASTATIN CALCIUM 20 MG/1
20 TABLET, COATED ORAL NIGHTLY
Qty: 90 TABLET | Refills: 0 | OUTPATIENT
Start: 2024-07-15

## 2024-08-08 NOTE — TELEPHONE ENCOUNTER
Left detailed vm to return call to reschedule appointment for 10/07/22 as melissa not in office in afternoon. If pt's call back please reschedule appointment. No. CODY screening performed.  STOP BANG Legend: 0-2 = LOW Risk; 3-4 = INTERMEDIATE Risk; 5-8 = HIGH Risk

## 2024-08-16 ENCOUNTER — OFFICE VISIT (OUTPATIENT)
Age: 49
End: 2024-08-16

## 2024-08-16 ENCOUNTER — PROCEDURE VISIT (OUTPATIENT)
Age: 49
End: 2024-08-16

## 2024-08-16 VITALS
SYSTOLIC BLOOD PRESSURE: 138 MMHG | DIASTOLIC BLOOD PRESSURE: 78 MMHG | BODY MASS INDEX: 46.65 KG/M2 | WEIGHT: 315 LBS | HEIGHT: 69 IN | OXYGEN SATURATION: 95 % | HEART RATE: 87 BPM

## 2024-08-16 DIAGNOSIS — H90.3 SENSORINEURAL HEARING LOSS, BILATERAL: Primary | ICD-10-CM

## 2024-08-16 DIAGNOSIS — H93.13 TINNITUS OF BOTH EARS: ICD-10-CM

## 2024-08-16 DIAGNOSIS — H90.3 SENSORINEURAL HEARING LOSS (SNHL) OF BOTH EARS: Primary | ICD-10-CM

## 2024-08-16 DIAGNOSIS — H91.03 OTOTOXIC HEARING LOSS OF BOTH EARS: ICD-10-CM

## 2024-08-16 ASSESSMENT — PATIENT HEALTH QUESTIONNAIRE - PHQ9
SUM OF ALL RESPONSES TO PHQ QUESTIONS 1-9: 0
2. FEELING DOWN, DEPRESSED OR HOPELESS: NOT AT ALL
SUM OF ALL RESPONSES TO PHQ QUESTIONS 1-9: 0
SUM OF ALL RESPONSES TO PHQ9 QUESTIONS 1 & 2: 0
SUM OF ALL RESPONSES TO PHQ QUESTIONS 1-9: 0
1. LITTLE INTEREST OR PLEASURE IN DOING THINGS: NOT AT ALL
SUM OF ALL RESPONSES TO PHQ QUESTIONS 1-9: 0

## 2024-08-16 NOTE — PROGRESS NOTES
Identified pt with two pt identifiers(name and ). Reviewed record in preparation for visit and have obtained necessary documentation. All patient medications has been reviewed.  No chief complaint on file.      Vitals:    24 1117   Pulse: 87   SpO2: 95%                   Coordination of Care Questionnaire:   1) Have you been to an emergency room, urgent care, or hospitalized since your last visit?   No       2. Have seen or consulted any other health care provider since your last visit? No        Patient is accompanied by self I have received verbal consent from Harinder Aldridge to discuss any/all medical information while they are present in the room.  
Admission medications    Medication Sig Start Date End Date Taking? Authorizing Provider   cabergoline (DOSTINEX) 0.5 MG tablet Take 1 tablet by mouth every 7 days. 7/14/24  Yes Marci Pearson MD MOUNJARO 5 MG/0.5ML SOPN SC injection INJECT THE CONTENTS OF ONE PEN  SUBCUTANEOUSLY WEEKLY AS  DIRECTED 6/10/24  Yes Marci Pearson MD   buPROPion (WELLBUTRIN SR) 150 MG extended release tablet Take 1 tablet by mouth 2 times daily 5/24/24  Yes Pilar Varela MD   insulin regular human (HUMULIN R U-500 KWIKPEN) 500 UNIT/ML SOPN concentrated injection pen Inject 70 units three times a day 5/17/24  Yes Marci Pearson MD   Insulin Pen Needle (PEN NEEDLES) 32G X 6 MM MISC Use to inject insulin 4 times daily. Dx code E11.65 5/17/24  Yes Marci Pearson MD   rosuvastatin (CRESTOR) 20 MG tablet Take 1 tablet by mouth nightly 5/17/24  Yes Marci Pearson MD   DULoxetine (CYMBALTA) 60 MG extended release capsule Take 1 capsule by mouth daily 3/19/24  Yes Pliar Varela MD   losartan-hydroCHLOROthiazide (HYZAAR) 100-25 MG per tablet Take 1 tablet by mouth daily 3/19/24  Yes Pilar Varela MD   spironolactone (ALDACTONE) 50 MG tablet Take 1 tablet by mouth daily 3/19/24  Yes Pilar Varela MD   amLODIPine (NORVASC) 5 MG tablet Take 1 tablet by mouth daily 3/19/24  Yes Pilar Varela MD   B Complex Vitamins (B COMPLEX 1 PO) Take by mouth   Yes Samantha Daniels MD   empagliflozin (JARDIANCE) 25 MG tablet Take 1 tablet by mouth daily 1/3/24  Yes Marci Pearson MD   Continuous Blood Gluc Sensor (DEXCOM G7 SENSOR) MISC Use to check blood glucose 4 times a day 1/3/24  Yes Marci Pearson MD        Allergies   Allergen Reactions    Erythromycin Other (See Comments)     Stomach cramps    Penicillins Hives    Sulfa Antibiotics Hives         Objective:     /78   Pulse 87   Ht 1.753 m (5' 9\")   Wt (!) 155.1 kg (342 lb)   SpO2 95%   BMI 50.50 kg/m²

## 2024-08-16 NOTE — PROGRESS NOTES
Harinder Aldridge   1975, 48 y.o. male   [id number]       Referring Provider: Yasir Castro MD  Referral Type: In an order in Epic    Reason for Visit: Evaluation of suspected change in hearing    ADULT AUDIOLOGIC EVALUATION      Harinder Aldridge is a 48 y.o. male seen today, 8/16/2024 , for an initial audiologic evaluation.  Patient was seen by Yasir Castro MD following today's evaluation.     AUDIOLOGIC AND OTHER PERTINENT MEDICAL HISTORY:      Harinder Aldridge noted a perceived decline in hearing, bilaterally. He notes family history of hearing loss in mother (use of hearing aids). No additional significant otologic or medical history was reported.     Harinder Aldridge denied otalgia, aural fullness, otorrhea, tinnitus, dizziness, imbalance, history of falls, history of occupational/recreational noise exposure, history of head trauma, and history of ear surgery.    Date: 8/16/2024     IMPRESSIONS:      Today's results revealed a symmetric sensorineural hearing loss with excellent word recognition, bilaterally. Hearing loss significant enough to create hearing difficulty in at least some listening situations. Discussed benefits of amplification. Patient is scheduled to return for hearing aid evaluation on 9/5/24 with Nish Sánchez. Follow medical recommendations of Yasir Castro MD.    ASSESSMENT AND FINDINGS:     Otoscopy revealed: Clear ear canals bilaterally    RIGHT EAR:  Hearing Sensitivity: Mild sloping to moderately-severe sensorineural hearing loss.  Speech Recognition Threshold: 40 dB HL  Word Recognition: Excellent 96%, based on NU-6 25-word list at 80 dBHL using recorded speech stimuli.    Tympanometry: Normal peak pressure and compliance, Type A tympanogram, consistent with normal middle ear function.        LEFT EAR:  Hearing Sensitivity: Mild sloping to moderately-severe to severe sensorineural hearing loss.   Speech Recognition Threshold: 40 dB HL  Word Recognition: Excellent 96%, based on

## 2024-09-05 ENCOUNTER — PROCEDURE VISIT (OUTPATIENT)
Age: 49
End: 2024-09-05

## 2024-09-05 DIAGNOSIS — H90.3 SENSORINEURAL HEARING LOSS (SNHL), BILATERAL: Primary | ICD-10-CM

## 2024-09-05 NOTE — PROGRESS NOTES
Harinder Aldridge  1975.49 y.o. male   [id number]      HEARING AID EVALUATION    Harinder Aldridge was seen today, 9/5/2024 , for a Hearing Aid Evaluation following audiologic evaluation on 8/16/2024.   Patient insurance benefit was unable to be determined prior to the appointment today. The patient will be called when insurance benefit is determined.    DATE: 9/5/2024       LIFESTYLE EVALUATION:        After a discussion of evaluation results, discussion of levels of technology and prices, and lifestyle assessment, Harinder Aldridge has decided to pursue hearing aids, pending information from his insurance. He inquired as to the difference between OTC devices, [employer] devices, and [employer]' prescription devices. Following the discussion, he maintained that he wished to purchase devices form [employer].  The patient will be called when his insurance benefit is determined.    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 Following a discussion of pricing and technology level, he intends to pursue standard, advanced, or premium devices, such as [employer] Intent. Color 90,  power 80, closed dome, AU. Size #2 .    Technology to be considered: O[employer] Intent 1, 2, or 3.        No charge for today's appointment.      Discussed with patient that any portion of the total cost that is not paid by insurance will be the patient's financial responsibility. Estimated insurance coverage is a verification of benefit and not a guarantee.     PATIENT EDUCATION:      - Verbally reviewed benefits and limitations of devices and available features in hearing aids.    - Verbally discussed realistic expectations of hearing aid use     RECOMMENDATIONS:      - Return after benefit is determined and devices are ordered to be fit with hearing aids.      Nish Sánchez  Audiologist

## 2024-10-04 ENCOUNTER — NURSE ONLY (OUTPATIENT)
Age: 49
End: 2024-10-04

## 2024-10-04 DIAGNOSIS — Z79.4 TYPE 2 DIABETES MELLITUS WITH HYPERGLYCEMIA, WITH LONG-TERM CURRENT USE OF INSULIN (HCC): Primary | ICD-10-CM

## 2024-10-04 DIAGNOSIS — E11.65 TYPE 2 DIABETES MELLITUS WITH HYPERGLYCEMIA, WITH LONG-TERM CURRENT USE OF INSULIN (HCC): Primary | ICD-10-CM

## 2024-10-04 NOTE — PROGRESS NOTES
[facility]  Diabetes Self-Management Education & Support Program  Encounter Note      SUMMARY  Diabetes self-care management training was completed related to reducing risks. The participant will return on October 11 to continue DSMES related to healthy eating and monitoring. The participant did identify SMART Goal(s) and will practice knowledge and skills related to reducing risks to improve diabetes self-management.        EVALUATION:  Participant shared that he has several medical concerns associated with diabetes - retinopathy requiring lazer treatments and injections, and neuropathy.  He was able to encourage other class members and give great examples of things to help with understanding content.  Very engaged in group discussions.  He shared that he is up to date on all annual exams.  He is also  up to date on recommended annual vaccines.    RECOMMENDATIONS:  Personal care record     TOPICS DISCUSSED TODAY:  WHAT IS DIABETES? Minutes: 60  HOW DO I STAY HEALTHY? 60      Next provider visit is scheduled for 10/11/24       SMART GOAL(S)   Manage blood glucose with goal of none over 225 and an average of under 180 over the next week.  ACHIEVEMENT OF GOAL(S) : 0-24%         DATE DSMES TOPIC EVALUATION     10/4/2024 WHAT IS DIABETES?   Role of the normal pancreas in energy balance and blood glucose control   The defect seen in diabetes   Signs & symptoms of diabetes   Diagnosis of diabetes   Types of diabetes   Blood glucose targets in non-pregnant & non-pregnant adults       The participant knows  Their type of diabetes: Yes   The basic physiologic defect: Yes  Blood glucose targets: Yes     DATE DSMES TOPIC EVALUATION     10/4/2024 HOW DO I STAY HEALTHY?   Prevention   Vaccinations   Preconception care (if applicable)  Examinations   Eye    Foot   Diabetic complications' prevention   Dental health   Heart health   Kidney Health   Nerve health   Sleep health      The participant has a

## 2024-10-11 ENCOUNTER — NURSE ONLY (OUTPATIENT)
Age: 49
End: 2024-10-11

## 2024-10-11 DIAGNOSIS — E11.65 TYPE 2 DIABETES MELLITUS WITH HYPERGLYCEMIA, WITH LONG-TERM CURRENT USE OF INSULIN (HCC): Primary | ICD-10-CM

## 2024-10-11 DIAGNOSIS — Z79.4 TYPE 2 DIABETES MELLITUS WITH HYPERGLYCEMIA, WITH LONG-TERM CURRENT USE OF INSULIN (HCC): Primary | ICD-10-CM

## 2024-10-11 NOTE — PROGRESS NOTES
[facility]  Diabetes Self-Management Education & Support Program  Encounter Note      SUMMARY  Diabetes self-care management training was completed related to healthy eating. The participant will return on October 18 to continue DSMES related to monitoring, taking medications, and physical activity. The participant did identify SMART Goal(s) and will practice knowledge and skills related to reducing risks and healthy eating to improve diabetes self-management.        EVALUATION:  Participant was engaged in learning and shared in group discussions.  He was able to support his classmates in helping to explain ideas.  He was able to design a healthy plate for lunch meal using food models.  He verbalized carbohydrate counting and label reading.  He met his personal SMART goal from last education session.     RECOMMENDATIONS:  Continue label reading and carbohydrate counting     TOPICS DISCUSSED TODAY:  WHAT CAN I EAT?  120 min      Next provider visit is scheduled for 10/18/24       DATE DSMES TOPIC EVALUATION     10/11/2024 WHAT CAN I EAT?   General principles   Determining a healthy weight   Nutritional terms & tools   Healthy Plate method   Carbohydrate Counting   Reading food labels   Free apps   Pregnancy recommendations      The participant   Uses Healthy Plate principles in constructing meals: Yes  Reads food labels in choosing acceptable foods: Yes           XIANG RIVERO RN on 10/11/2024 at 12:08 PM    I have personally reviewed the health record, including provider notes, laboratory data and current medications before making these care and education recommendations. The time spent in this effort is included in the total time.  Total minutes: 120

## 2024-10-18 ENCOUNTER — NURSE ONLY (OUTPATIENT)
Age: 49
End: 2024-10-18

## 2024-10-18 DIAGNOSIS — E11.65 TYPE 2 DIABETES MELLITUS WITH HYPERGLYCEMIA, WITH LONG-TERM CURRENT USE OF INSULIN (HCC): Primary | ICD-10-CM

## 2024-10-18 DIAGNOSIS — Z79.4 TYPE 2 DIABETES MELLITUS WITH HYPERGLYCEMIA, WITH LONG-TERM CURRENT USE OF INSULIN (HCC): Primary | ICD-10-CM

## 2024-10-18 PROCEDURE — G0109 DIAB MANAGE TRN IND/GROUP: HCPCS

## 2024-10-21 NOTE — PROGRESS NOTES
[facility]  Diabetes Self-Management Education & Support Program  Encounter Note      SUMMARY  Diabetes self-care management training was completed related to monitoring, taking medications, and physical activity. The participant will return on October 25 to continue DSMES related to healthy coping and problem solving. The participant did identify SMART Goal(s) and will practice knowledge and skills related to reducing risks, healthy eating and monitoring, and being active and medications to improve diabetes self-management.        EVALUATION:  Particpant was engaged in learning and shared in group discussions.  He shared with the class that he uses  a CGM for monitoring.  Verbalized understanding of need to check with finger stick when he he gets abnormal values.  He states that he takes his medications as prescribed for full benefit.  He participated in group exercise during class.    RECOMMENDATIONS:  Continue physical activity as tolerated       TOPICS DISCUSSED TODAY:  HOW CAN BLOOD GLUCOSE MONITORING HELP ME? 30  HOW DO MY DIABETES MEDICATIONS WORK? 30  HOW DOES PHYSICAL ACTIVITY AFFECT MY DIABETES? 60      Next provider visit is scheduled for 10/25/24       SMART GOAL(S)  Increase physical activity by walking for 30 minutes 3 times over the next week.  ACHIEVEMENT OF GOAL(S) : 0-24%    2.    Manage blood glucose with goal of none over 225 and an average of under 180 over the next week.  ACHIEVEMENT OF GOAL(S) :  %         DATE DSMES TOPIC EVALUATION     10/21/2024 HOW CAN BLOOD GLUCOSE MONITORING HELP ME?   Value of blood glucose monitoring   Realistic expectations   Blood glucose monitoring targets   Target adjustments   Setting a1c & blood glucose targets with provider   Meter selection    Technique for obtaining blood droplet   Blood glucose testing sites   Determining best times to test   Pregnancy recommendations   Data sharing with provider        The participant   Can

## 2024-10-25 ENCOUNTER — NURSE ONLY (OUTPATIENT)
Age: 49
End: 2024-10-25

## 2024-10-25 DIAGNOSIS — E11.65 TYPE 2 DIABETES MELLITUS WITH HYPERGLYCEMIA, WITH LONG-TERM CURRENT USE OF INSULIN (HCC): Primary | ICD-10-CM

## 2024-10-25 DIAGNOSIS — Z79.4 TYPE 2 DIABETES MELLITUS WITH HYPERGLYCEMIA, WITH LONG-TERM CURRENT USE OF INSULIN (HCC): Primary | ICD-10-CM

## 2024-10-25 PROCEDURE — G0109 DIAB MANAGE TRN IND/GROUP: HCPCS | Performed by: DIETITIAN, REGISTERED

## 2024-10-25 NOTE — PROGRESS NOTES
[facility]  Diabetes Self-Management Education & Support Program  Encounter Note      SUMMARY  Diabetes self-care management training was completed related to healthy coping and problem solving. The participant will return on January 07, 2025 for post program evaluation. The participant did identify SMART Goal(s) and will practice knowledge and skills related to reducing risks, healthy eating, monitoring, being active, medications, healthy coping, and problem solving to improve diabetes self-management.        EVALUATION:  Mr. Aldridge offered support to other members in class about how to apply critical thinking. He demonstrated the ability to identify strategies for problem solving and BG patterns.     RECOMMENDATIONS:  Continue to practice DSM skills  Post program evaluation     TOPICS DISCUSSED TODAY:  HOW DO I FIND SUPPORT TO TACKLE THIS CONDITION? 60  HOW DO I FIGURE OUT WHAT'S INFLUENCING MY BLOOD GLUCOSES? 60      Next provider visit is scheduled for TBD; DSMES: 1/7/2025       SMART GOAL(S)  Increase physical activity by walking for 30 minutes 3 times over the next week   ACHIEVEMENT OF GOAL(S) : %    2. Practice Healthy plate template/CHO counting and label reading for designing balanced meals.   ACHIEVEMENT OF GOAL(S) : %       DATE DSMES TOPIC EVALUATION     10/25/2024 HOW DO I FIND SUPPORT TO TACKLE THIS CONDITION?   Normal responses to diabetes diagnosis or complication   Shock   Anger & resentment   Guilt/self-blame   Sadness & worry   Depression    Anxiety   Constructive strategies to normal responses    Exploring feelings & attitudes   Motivation: Cost versus benefits analysis   Problem-solving: Chain analysis   Obtaining support: Communicating   Family & friends   Health team   iii. Community   Stress   Symptoms   Managing stress   Fill your tool kit        The participant can identify people that support them in caring for their diabetes health:

## 2024-11-03 RX ORDER — INSULIN HUMAN 500 [IU]/ML
INJECTION, SOLUTION SUBCUTANEOUS
Qty: 42 ML | Refills: 0 | Status: SHIPPED | OUTPATIENT
Start: 2024-11-03